# Patient Record
Sex: FEMALE | Race: WHITE | Employment: FULL TIME | URBAN - METROPOLITAN AREA
[De-identification: names, ages, dates, MRNs, and addresses within clinical notes are randomized per-mention and may not be internally consistent; named-entity substitution may affect disease eponyms.]

---

## 2017-11-25 ENCOUNTER — NURSE ONLY (OUTPATIENT)
Dept: FAMILY MEDICINE CLINIC | Facility: CLINIC | Age: 22
End: 2017-11-25

## 2017-11-25 VITALS
OXYGEN SATURATION: 97 % | RESPIRATION RATE: 20 BRPM | SYSTOLIC BLOOD PRESSURE: 118 MMHG | DIASTOLIC BLOOD PRESSURE: 80 MMHG | HEART RATE: 82 BPM | TEMPERATURE: 98 F

## 2017-11-25 DIAGNOSIS — L60.0 INGROWN NAIL: Primary | ICD-10-CM

## 2017-11-25 PROCEDURE — 99202 OFFICE O/P NEW SF 15 MIN: CPT | Performed by: NURSE PRACTITIONER

## 2017-11-25 RX ORDER — CEPHALEXIN 500 MG/1
500 CAPSULE ORAL 3 TIMES DAILY
Qty: 21 CAPSULE | Refills: 0 | Status: SHIPPED | OUTPATIENT
Start: 2017-11-25 | End: 2017-12-02

## 2017-11-25 NOTE — PATIENT INSTRUCTIONS
Follow up with podiatry  Ibuprofen as needed for pain        Ingrown Toenail (Excised)  An ingrown toenail occurs when the nail grows sideways into the skin next to the nail.  This can cause pain and may lead to an infection with redness, swelling, and some If there is a lot of redness and swelling, then an antibiotic may also be used. The redness and pain should go away within 48 hours. It will take about 2 weeks for the exposed nail bed to become dry and for the swelling to go down.   If only the side of the · Don’t use a sharp object to clean under your nail since this might cause an infection. · If the toenail starts to grow into the skin again, put a small piece of cotton under that side of the nail to help it grow out straight.   Follow-up care  Follow up

## 2017-11-25 NOTE — PROGRESS NOTES
CHIEF COMPLAINT:     Patient presents with:  Ingrown Toenail      HPI:   Steven Navarro is a 25year old female who presents with complaints of bilat ingrown toe nails that are very painful and draining yellow discharge.  Pt reports h/o ingrown nails and ha The most common cause of an ingrown toenail is trimming your toenails wrong. Most people trim the nails too close to the skin and try to round the nail too tightly around the shape of the toe. When you do this, the nail can grow into the skin of the toe.  Evander Harada If only the side of the nail was removed, it will begin to grow back in a few months. To prevent recurrence, sometimes the side of the nail bed may be treated with a strong chemical to prevent the nail from growing back.   Home care  Wound care  · Twice a d Follow-up care  Follow up as advised by your healthcare provider. If the ingrown toenail recurs, follow up with a foot specialist (podiatrist) for nail bed ablation.   When to seek medical care  Call your healthcare provider right away if any of these occur

## 2018-01-16 ENCOUNTER — LAB ENCOUNTER (OUTPATIENT)
Dept: LAB | Age: 23
End: 2018-01-16
Attending: INTERNAL MEDICINE
Payer: COMMERCIAL

## 2018-01-16 ENCOUNTER — OFFICE VISIT (OUTPATIENT)
Dept: FAMILY MEDICINE CLINIC | Facility: CLINIC | Age: 23
End: 2018-01-16

## 2018-01-16 VITALS
TEMPERATURE: 98 F | WEIGHT: 170.5 LBS | DIASTOLIC BLOOD PRESSURE: 70 MMHG | HEART RATE: 96 BPM | OXYGEN SATURATION: 97 % | SYSTOLIC BLOOD PRESSURE: 108 MMHG | HEIGHT: 66 IN | BODY MASS INDEX: 27.4 KG/M2

## 2018-01-16 DIAGNOSIS — N92.6 IRREGULAR PERIODS: ICD-10-CM

## 2018-01-16 DIAGNOSIS — L60.0 INGROWN LEFT BIG TOENAIL: Primary | ICD-10-CM

## 2018-01-16 DIAGNOSIS — R61 SWEATING PROFUSELY: ICD-10-CM

## 2018-01-16 PROCEDURE — 99203 OFFICE O/P NEW LOW 30 MIN: CPT | Performed by: INTERNAL MEDICINE

## 2018-01-16 PROCEDURE — 84402 ASSAY OF FREE TESTOSTERONE: CPT

## 2018-01-16 PROCEDURE — 80053 COMPREHEN METABOLIC PANEL: CPT

## 2018-01-16 PROCEDURE — 84439 ASSAY OF FREE THYROXINE: CPT

## 2018-01-16 PROCEDURE — 83002 ASSAY OF GONADOTROPIN (LH): CPT

## 2018-01-16 PROCEDURE — 36415 COLL VENOUS BLD VENIPUNCTURE: CPT

## 2018-01-16 PROCEDURE — 84403 ASSAY OF TOTAL TESTOSTERONE: CPT

## 2018-01-16 PROCEDURE — 83001 ASSAY OF GONADOTROPIN (FSH): CPT

## 2018-01-16 PROCEDURE — 85025 COMPLETE CBC W/AUTO DIFF WBC: CPT

## 2018-01-16 PROCEDURE — 84443 ASSAY THYROID STIM HORMONE: CPT

## 2018-01-16 PROCEDURE — 84146 ASSAY OF PROLACTIN: CPT

## 2018-01-16 RX ORDER — NORGESTIMATE AND ETHINYL ESTRADIOL 7DAYSX3 28
1 KIT ORAL DAILY
Qty: 3 PACKAGE | Refills: 3 | Status: SHIPPED | OUTPATIENT
Start: 2018-01-16 | End: 2018-04-10

## 2018-01-17 PROBLEM — Z91.51 HX OF SUICIDE ATTEMPT: Status: ACTIVE | Noted: 2018-01-17

## 2018-01-17 LAB
ALBUMIN SERPL-MCNC: 3.9 G/DL (ref 3.5–4.8)
ALP LIVER SERPL-CCNC: 75 U/L (ref 52–144)
ALT SERPL-CCNC: 29 U/L (ref 14–54)
AST SERPL-CCNC: 31 U/L (ref 15–41)
BASOPHILS # BLD AUTO: 0.03 X10(3) UL (ref 0–0.1)
BASOPHILS NFR BLD AUTO: 0.4 %
BILIRUB SERPL-MCNC: 0.4 MG/DL (ref 0.1–2)
BUN BLD-MCNC: 11 MG/DL (ref 8–20)
CALCIUM BLD-MCNC: 9.4 MG/DL (ref 8.3–10.3)
CHLORIDE: 103 MMOL/L (ref 101–111)
CO2: 27 MMOL/L (ref 22–32)
CREAT BLD-MCNC: 0.93 MG/DL (ref 0.55–1.02)
EOSINOPHIL # BLD AUTO: 0.07 X10(3) UL (ref 0–0.3)
EOSINOPHIL NFR BLD AUTO: 1 %
ERYTHROCYTE [DISTWIDTH] IN BLOOD BY AUTOMATED COUNT: 12.2 % (ref 11.5–16)
FREE T4: 0.9 NG/DL (ref 0.9–1.8)
FSH: 4.6 MIU/ML
GLUCOSE BLD-MCNC: 81 MG/DL (ref 70–99)
HCT VFR BLD AUTO: 42.3 % (ref 34–50)
HGB BLD-MCNC: 13.9 G/DL (ref 12–16)
IMMATURE GRANULOCYTE COUNT: 0.01 X10(3) UL (ref 0–1)
IMMATURE GRANULOCYTE RATIO %: 0.1 %
LH: 3.4 MIU/ML
LYMPHOCYTES # BLD AUTO: 2.12 X10(3) UL (ref 0.9–4)
LYMPHOCYTES NFR BLD AUTO: 31.2 %
M PROTEIN MFR SERPL ELPH: 8.1 G/DL (ref 6.1–8.3)
MCH RBC QN AUTO: 30.1 PG (ref 27–33.2)
MCHC RBC AUTO-ENTMCNC: 32.9 G/DL (ref 31–37)
MCV RBC AUTO: 91.6 FL (ref 81–100)
MONOCYTES # BLD AUTO: 0.57 X10(3) UL (ref 0.1–0.6)
MONOCYTES NFR BLD AUTO: 8.4 %
NEUTROPHIL ABS PRELIM: 3.99 X10 (3) UL (ref 1.3–6.7)
NEUTROPHILS # BLD AUTO: 3.99 X10(3) UL (ref 1.3–6.7)
NEUTROPHILS NFR BLD AUTO: 58.9 %
PLATELET # BLD AUTO: 328 10(3)UL (ref 150–450)
POTASSIUM SERPL-SCNC: 4 MMOL/L (ref 3.6–5.1)
PROLACTIN: 10.9 NG/ML
RBC # BLD AUTO: 4.62 X10(6)UL (ref 3.8–5.1)
RED CELL DISTRIBUTION WIDTH-SD: 41.1 FL (ref 35.1–46.3)
SODIUM SERPL-SCNC: 138 MMOL/L (ref 136–144)
TSI SER-ACNC: 1.66 MIU/ML (ref 0.35–5.5)
WBC # BLD AUTO: 6.8 X10(3) UL (ref 4–13)

## 2018-01-17 NOTE — PROGRESS NOTES
Steven Navarro is a 25year old female. HPI:   Pt new to me has a significant mental health hx. Grew up in a house hold where father addicted to drugs, and not reliable, and mother an alcoholic. She is one of nine children in a blended family.   She has rashes  RESPIRATORY: denies shortness of breath with exertion  CARDIOVASCULAR: denies chest pain on exertion  GI: denies abdominal pain and denies heartburn  NEURO: denies headaches    EXAM:   /70   Pulse 96   Temp 98.3 °F (36.8 °C) (Temporal)   Ht 6

## 2018-01-20 LAB
SEX HORMONE BINDING GLOBULIN: 54 NMOL/L
TESTOSTERONE -MS, BIOAVAILAB: 11.6 NG/DL
TESTOSTERONE, -MS/MS: 33 NG/DL
TESTOSTERONE, FREE -MS/MS: 4 PG/ML

## 2018-01-22 DIAGNOSIS — L60.0 INGROWN NAIL OF GREAT TOE OF LEFT FOOT: Primary | ICD-10-CM

## 2018-03-04 ENCOUNTER — PATIENT MESSAGE (OUTPATIENT)
Dept: FAMILY MEDICINE CLINIC | Facility: CLINIC | Age: 23
End: 2018-03-04

## 2018-03-05 PROBLEM — F32.9 MAJOR DEPRESSION: Status: ACTIVE | Noted: 2018-03-05

## 2018-03-05 NOTE — TELEPHONE ENCOUNTER
From: Nilda Hayden  To: Audrey Guerrero MD  Sent: 3/4/2018 3:04 PM CST  Subject: Prescription Question    The birth control I'm taking is really effecting my mood.  I've been extremely depressed and was waiting, hoping it would pass but I'm almost done wit

## 2018-03-05 NOTE — TELEPHONE ENCOUNTER
From: Colonel Xie  To: Song Kothari MD  Sent: 3/4/2018 10:08 PM CST  Subject: Other    I ended up going to the emergency room because my thoughts became so intense.  If you have an available appointment tomorrow I would like to see you as soon as sophy

## 2018-03-06 PROBLEM — Z30.41 ORAL CONTRACEPTIVE USE: Status: ACTIVE | Noted: 2018-03-06

## 2018-03-06 PROBLEM — F41.0 PANIC DISORDER WITHOUT AGORAPHOBIA: Status: ACTIVE | Noted: 2018-03-06

## 2018-03-06 PROBLEM — F39 EPISODIC MOOD DISORDER: Status: ACTIVE | Noted: 2018-03-06

## 2018-03-06 PROBLEM — F39 EPISODIC MOOD DISORDER (HCC): Status: ACTIVE | Noted: 2018-03-06

## 2018-03-13 ENCOUNTER — TELEPHONE (OUTPATIENT)
Dept: FAMILY MEDICINE CLINIC | Facility: CLINIC | Age: 23
End: 2018-03-13

## 2018-03-13 NOTE — PROGRESS NOTES
Chucky Horne is a 25year old female. HPI:   Pt has been to [facility] inpatient 3/4-3/11 for anxiety and self harm and transitioned to IOP. She does not want to complete IOP. She wants to start individual therapy.  She NEEDS me to write her prescript murmur  GI: good BS's,no masses, HSM or tenderness  EXTREMITIES: no cyanosis, clubbing or edema    ASSESSMENT AND PLAN:   Generalized anxiety disorder  (primary encounter diagnosis)   Pt has been stabilized on meds.  I discussed that she needs to use loraze

## 2018-04-09 RX ORDER — NORGESTIMATE AND ETHINYL ESTRADIOL 7DAYSX3 28
1 KIT ORAL DAILY
Qty: 3 PACKAGE | Refills: 3 | Status: CANCELLED
Start: 2018-04-09 | End: 2019-04-04

## 2018-04-10 NOTE — PROGRESS NOTES
Faina Smith is a 25year old female. HPI:   Pt had a break up last week. Very positive, she is taking Lorazepam at night only. She continues to exercise and lose weight. She needs refills on her medications,  No suicidal ideations.      Current Outpat of recurrent major depressive disorder (hcc)  (primary encounter diagnosis)  Pt has been doing well so far, she has a shallow support system and seems to be doing ok. She is weathering a break up smoothly.   She needs meds and I will refill for a month the

## 2018-04-11 NOTE — TELEPHONE ENCOUNTER
From: Yolie Barry  Sent: 4/9/2018 5:22 PM CDT  Subject: Medication Renewal Request    Lory lara would like a refill of the following medications:     Norgestim-Eth Estrad Triphasic (ORTHO TRI-CYCLEN, 28,) 0.18/0.215/0.25 MG-35 MCG Oral Tab Eri Gee MD]    Preferred pharmacy: [address] [place] [address], [address] [place] [address],[address] AT [facility] [address], [phone], [phone]    Comment:      Medication renewals requested in this message routed separately:     lamoTRIgine 25 MG Oral Tab Krystle Marshall MD]     Sertraline HCl 50 MG Oral Tab Krystle Marshall MD]

## 2018-05-03 RX ORDER — LAMOTRIGINE 25 MG/1
TABLET ORAL
Qty: 180 TABLET | Refills: 0 | Status: SHIPPED | OUTPATIENT
Start: 2018-05-03 | End: 2018-08-17

## 2018-05-23 ENCOUNTER — TELEPHONE (OUTPATIENT)
Dept: FAMILY MEDICINE CLINIC | Facility: CLINIC | Age: 23
End: 2018-05-23

## 2018-08-17 RX ORDER — LAMOTRIGINE 25 MG/1
TABLET ORAL
Qty: 180 TABLET | Refills: 0 | Status: SHIPPED | OUTPATIENT
Start: 2018-08-17 | End: 2018-09-17

## 2018-09-17 ENCOUNTER — OFFICE VISIT (OUTPATIENT)
Dept: FAMILY MEDICINE CLINIC | Facility: CLINIC | Age: 23
End: 2018-09-17

## 2018-09-17 ENCOUNTER — APPOINTMENT (OUTPATIENT)
Dept: LAB | Age: 23
End: 2018-09-17
Attending: INTERNAL MEDICINE
Payer: COMMERCIAL

## 2018-09-17 VITALS
DIASTOLIC BLOOD PRESSURE: 60 MMHG | WEIGHT: 146 LBS | TEMPERATURE: 97 F | BODY MASS INDEX: 24.32 KG/M2 | HEIGHT: 65 IN | SYSTOLIC BLOOD PRESSURE: 102 MMHG | RESPIRATION RATE: 12 BRPM | HEART RATE: 68 BPM

## 2018-09-17 DIAGNOSIS — Z00.00 WELL ADULT EXAM: Primary | ICD-10-CM

## 2018-09-17 DIAGNOSIS — Z00.00 WELL ADULT EXAM: ICD-10-CM

## 2018-09-17 LAB
APPEARANCE: CLEAR
MULTISTIX LOT#: NORMAL NUMERIC
PH, URINE: 7 (ref 4.5–8)
SPECIFIC GRAVITY: 1.02 (ref 1–1.03)
URINE-COLOR: YELLOW
UROBILINOGEN,SEMI-QN: 0.2 MG/DL (ref 0–1.9)

## 2018-09-17 PROCEDURE — 87591 N.GONORRHOEAE DNA AMP PROB: CPT | Performed by: INTERNAL MEDICINE

## 2018-09-17 PROCEDURE — 87389 HIV-1 AG W/HIV-1&-2 AB AG IA: CPT

## 2018-09-17 PROCEDURE — 87491 CHLMYD TRACH DNA AMP PROBE: CPT | Performed by: INTERNAL MEDICINE

## 2018-09-17 PROCEDURE — 88175 CYTOPATH C/V AUTO FLUID REDO: CPT | Performed by: INTERNAL MEDICINE

## 2018-09-17 PROCEDURE — 99395 PREV VISIT EST AGE 18-39: CPT | Performed by: INTERNAL MEDICINE

## 2018-09-17 PROCEDURE — 36415 COLL VENOUS BLD VENIPUNCTURE: CPT

## 2018-09-17 PROCEDURE — 81003 URINALYSIS AUTO W/O SCOPE: CPT | Performed by: INTERNAL MEDICINE

## 2018-09-17 RX ORDER — LAMOTRIGINE 25 MG/1
25 TABLET ORAL 2 TIMES DAILY
Qty: 180 TABLET | Refills: 3 | Status: SHIPPED | OUTPATIENT
Start: 2018-09-17 | End: 2019-11-08 | Stop reason: ALTCHOICE

## 2018-09-17 RX ORDER — LORAZEPAM 1 MG/1
1 TABLET ORAL 2 TIMES DAILY PRN
Qty: 30 TABLET | Refills: 0 | Status: SHIPPED | OUTPATIENT
Start: 2018-09-17 | End: 2019-11-08 | Stop reason: ALTCHOICE

## 2018-09-17 NOTE — PROGRESS NOTES
HPI:   Camila Gallagher is a 25year old female who presents for a complete physical exam. Symptoms: denies discharge, itching, burning or dysuria, periods are regular.  Patient complains of pain with intercourse, deep, start to finish--does not use condoms and Brother       Social History:   Social History    Tobacco Use      Smoking status: Never Smoker      Smokeless tobacco: Never Used    Alcohol use: No      Alcohol/week: 1.2 oz      Types: 2 Glasses of wine per week    Drug use: Yes      Types: Cannabis intact,motor and sensory are grossly intact    ASSESSMENT AND PLAN:   Madisyn Suggs is a 25year old female who presents for a complete physical exam. Pap and pelvic done. Health maintenance, will check fasting Lipids, CMP, and CBC.  Pt referred for screeni

## 2018-09-18 LAB
C TRACH DNA SPEC QL NAA+PROBE: NEGATIVE
N GONORRHOEA DNA SPEC QL NAA+PROBE: NEGATIVE

## 2018-11-15 NOTE — TELEPHONE ENCOUNTER
Last OV 9/17/18, last refills 9/17/18 with 3 refills to [facility]. LM for patient to [facility] does patient want this sent to [facility]?

## 2018-11-21 RX ORDER — LAMOTRIGINE 25 MG/1
TABLET ORAL
Qty: 180 TABLET | Refills: 0 | Status: SHIPPED | OUTPATIENT
Start: 2018-11-21 | End: 2019-08-20

## 2019-04-01 RX ORDER — NORGESTIMATE AND ETHINYL ESTRADIOL 7DAYSX3 28
KIT ORAL
Qty: 84 TABLET | Refills: 1 | Status: SHIPPED | OUTPATIENT
Start: 2019-04-01 | End: 2019-04-08

## 2019-04-08 ENCOUNTER — PATIENT MESSAGE (OUTPATIENT)
Dept: FAMILY MEDICINE CLINIC | Facility: CLINIC | Age: 24
End: 2019-04-08

## 2019-04-08 RX ORDER — NORGESTIMATE AND ETHINYL ESTRADIOL 7DAYSX3 28
1 KIT ORAL
Qty: 84 TABLET | Refills: 1 | Status: SHIPPED | OUTPATIENT
Start: 2019-04-08 | End: 2020-03-18

## 2019-04-08 NOTE — TELEPHONE ENCOUNTER
From: Stalin Abrams  To: Drew Plaza MD  Sent: 4/8/2019 4:05 PM CDT  Subject: Non-Urgent Medical Question    Can I have my prescription transferred to [address] [address] [address]

## 2019-04-09 NOTE — TELEPHONE ENCOUNTER
From: Atif Martínez  To: Shania Smith MD  Sent: 4/8/2019 4:57 PM CDT  Subject: Non-Urgent Medical Question    [facility] [place]?

## 2019-05-24 NOTE — TELEPHONE ENCOUNTER
Advised to come at 230pm today.
SHE WANTS TO KNOW IF SHE CAN COME IN TODAY TO SEE DR BUI BECAUSE SHE SAID THAT SHE NEEDS TO SEE HER RIGHT AWAY AND DOESN'T WANT TO WAIT UNTIL TOMORROW
Patient with one or more new problems requiring additional work-up/treatment.

## 2019-08-20 RX ORDER — LAMOTRIGINE 25 MG/1
TABLET ORAL
Qty: 180 TABLET | Refills: 0 | Status: SHIPPED | OUTPATIENT
Start: 2019-08-20 | End: 2019-11-08 | Stop reason: ALTCHOICE

## 2019-08-21 NOTE — TELEPHONE ENCOUNTER
[phone] (home)   Mailbox full. Unable to leave message. [phone] mom Marie's cell. Left message for patient to call back.   (Mom is listed on HIPPA form.)

## 2019-08-29 NOTE — TELEPHONE ENCOUNTER
[phone] (home)   Mailbox full. Unable to leave message. [phone] Left message for patient to call back.

## 2019-11-08 ENCOUNTER — OFFICE VISIT (OUTPATIENT)
Dept: FAMILY MEDICINE CLINIC | Facility: CLINIC | Age: 24
End: 2019-11-08
Payer: MEDICAID

## 2019-11-08 VITALS
BODY MASS INDEX: 25.99 KG/M2 | RESPIRATION RATE: 18 BRPM | DIASTOLIC BLOOD PRESSURE: 80 MMHG | HEIGHT: 65 IN | WEIGHT: 156 LBS | SYSTOLIC BLOOD PRESSURE: 120 MMHG | TEMPERATURE: 98 F | OXYGEN SATURATION: 98 % | HEART RATE: 88 BPM

## 2019-11-08 DIAGNOSIS — H60.392 OTHER INFECTIVE ACUTE OTITIS EXTERNA OF LEFT EAR: ICD-10-CM

## 2019-11-08 DIAGNOSIS — J01.00 ACUTE MAXILLARY SINUSITIS, RECURRENCE NOT SPECIFIED: ICD-10-CM

## 2019-11-08 DIAGNOSIS — T16.2XXA FOREIGN BODY OF LEFT EAR, INITIAL ENCOUNTER: Primary | ICD-10-CM

## 2019-11-08 PROCEDURE — 69200 CLEAR OUTER EAR CANAL: CPT | Performed by: NURSE PRACTITIONER

## 2019-11-08 PROCEDURE — 99213 OFFICE O/P EST LOW 20 MIN: CPT | Performed by: NURSE PRACTITIONER

## 2019-11-08 RX ORDER — NEOMYCIN SULFATE, POLYMYXIN B SULFATE AND HYDROCORTISONE 10; 3.5; 1 MG/ML; MG/ML; [USP'U]/ML
4 SUSPENSION/ DROPS AURICULAR (OTIC) 3 TIMES DAILY
Qty: 1 BOTTLE | Refills: 0 | Status: SHIPPED | OUTPATIENT
Start: 2019-11-08 | End: 2019-12-17 | Stop reason: ALTCHOICE

## 2019-11-08 RX ORDER — AMOXICILLIN AND CLAVULANATE POTASSIUM 875; 125 MG/1; MG/1
1 TABLET, FILM COATED ORAL 2 TIMES DAILY
Qty: 20 TABLET | Refills: 0 | Status: SHIPPED | OUTPATIENT
Start: 2019-11-08 | End: 2019-11-18

## 2019-11-08 NOTE — PROGRESS NOTES
CHIEF COMPLAINT:   Patient presents with:  Cough: sob, left earpain, sinus congestion  x 1 week       HPI:   Curly Adam is a 22year old female who presents for sinus congestion for  1  weeks. Symptoms have been worsening since onset.  Sinus congestion/pa LUNGS: denies shortness of breath or wheezing, See HPI  CARDIOVASCULAR: denies chest pain or palpitations   GI: denies N/V/C or abdominal pain  NEURO: + sinus headaches. No numbness or tingling in face.     EXAM:   /80 (BP Location: Left arm, Patient Meds as below. Mucinex to help thin secretions. Increase fluids and rest.  Comfort care as described in Patient Instructions. Follow up with PCP in 2-3 days if no improvement, sooner if worsening.  If any difficulty breathing, SOB, or wheezing seek emergen · You can use an over-the-counter decongestant, unless a similar medicine was prescribed to you. Nasal sprays work the fastest. Use one that contains phenylephrine or oxymetazoline. First blow your nose gently. Then use the spray.  Do not use these medicine · Don’t have close contact with people who have sore throats, colds, or other upper respiratory infections. · Don’t smoke, and stay away from secondhand smoke. · Stay up to date with of your vaccines.   Date Last Reviewed: 11/1/2017  © 1192-8162 [employer]

## 2019-12-17 ENCOUNTER — OFFICE VISIT (OUTPATIENT)
Dept: FAMILY MEDICINE CLINIC | Facility: CLINIC | Age: 24
End: 2019-12-17
Payer: MEDICAID

## 2019-12-17 VITALS
RESPIRATION RATE: 16 BRPM | HEART RATE: 82 BPM | BODY MASS INDEX: 28.82 KG/M2 | DIASTOLIC BLOOD PRESSURE: 70 MMHG | WEIGHT: 173 LBS | HEIGHT: 65 IN | SYSTOLIC BLOOD PRESSURE: 120 MMHG | TEMPERATURE: 98 F

## 2019-12-17 DIAGNOSIS — F41.9 ANXIETY: ICD-10-CM

## 2019-12-17 DIAGNOSIS — Z01.419 WELL WOMAN EXAM WITH ROUTINE GYNECOLOGICAL EXAM: Primary | ICD-10-CM

## 2019-12-17 DIAGNOSIS — R68.89 UNINTENTIONAL WEIGHT CHANGE: ICD-10-CM

## 2019-12-17 DIAGNOSIS — Z20.2 EXPOSURE TO SEXUALLY TRANSMITTED DISEASE (STD): ICD-10-CM

## 2019-12-17 DIAGNOSIS — J01.20 SUBACUTE ETHMOIDAL SINUSITIS: ICD-10-CM

## 2019-12-17 DIAGNOSIS — R10.2 PELVIC PAIN: ICD-10-CM

## 2019-12-17 DIAGNOSIS — N94.10 DYSPAREUNIA, FEMALE: ICD-10-CM

## 2019-12-17 PROBLEM — A60.00 GENITAL HERPES: Status: ACTIVE | Noted: 2019-07-26

## 2019-12-17 PROCEDURE — 86780 TREPONEMA PALLIDUM: CPT | Performed by: FAMILY MEDICINE

## 2019-12-17 PROCEDURE — 99385 PREV VISIT NEW AGE 18-39: CPT | Performed by: FAMILY MEDICINE

## 2019-12-17 PROCEDURE — 86803 HEPATITIS C AB TEST: CPT | Performed by: FAMILY MEDICINE

## 2019-12-17 PROCEDURE — 88175 CYTOPATH C/V AUTO FLUID REDO: CPT | Performed by: FAMILY MEDICINE

## 2019-12-17 PROCEDURE — 87591 N.GONORRHOEAE DNA AMP PROB: CPT | Performed by: FAMILY MEDICINE

## 2019-12-17 PROCEDURE — 86706 HEP B SURFACE ANTIBODY: CPT | Performed by: FAMILY MEDICINE

## 2019-12-17 PROCEDURE — 87624 HPV HI-RISK TYP POOLED RSLT: CPT | Performed by: FAMILY MEDICINE

## 2019-12-17 PROCEDURE — 84443 ASSAY THYROID STIM HORMONE: CPT | Performed by: FAMILY MEDICINE

## 2019-12-17 PROCEDURE — 80053 COMPREHEN METABOLIC PANEL: CPT | Performed by: FAMILY MEDICINE

## 2019-12-17 PROCEDURE — 85025 COMPLETE CBC W/AUTO DIFF WBC: CPT | Performed by: FAMILY MEDICINE

## 2019-12-17 PROCEDURE — 87389 HIV-1 AG W/HIV-1&-2 AB AG IA: CPT | Performed by: FAMILY MEDICINE

## 2019-12-17 PROCEDURE — 87625 HPV TYPES 16 & 18 ONLY: CPT | Performed by: FAMILY MEDICINE

## 2019-12-17 PROCEDURE — 87491 CHLMYD TRACH DNA AMP PROBE: CPT | Performed by: FAMILY MEDICINE

## 2019-12-17 PROCEDURE — 87340 HEPATITIS B SURFACE AG IA: CPT | Performed by: FAMILY MEDICINE

## 2019-12-17 RX ORDER — CEFDINIR 300 MG/1
300 CAPSULE ORAL 2 TIMES DAILY
Qty: 20 CAPSULE | Refills: 0 | Status: SHIPPED | OUTPATIENT
Start: 2019-12-17 | End: 2019-12-27

## 2019-12-17 NOTE — PROGRESS NOTES
HPI:   Warden Holbrook is a 22year old female who presents for a complete physical exam. Symptoms: denies discharge, itching, burning or dysuria, periods are regular. Patient complains of issues below. Was on lamictal and zoloft starting at age 13.  She w Oral Cap Take 1 capsule (300 mg total) by mouth 2 (two) times daily for 10 days. 20 capsule 0   • Norgestim-Eth Estrad Triphasic (TRI-SPRINTEC) 0.18/0.215/0.25 MG-35 MCG Oral Tab Take 1 tablet by mouth once daily.  84 tablet 1      Past Medical History:   D BMI 28.79 kg/m²   Body mass index is 28.79 kg/m².    GENERAL: well developed, well nourished,in no apparent distress  SKIN: no rashes,no suspicious lesions  HEENT: atraumatic, normocephalic,ears and throat are clear, but purulent drainage from sinuses and lexapro (something new for her). - treat sinus infection. - refer to gyne for pain with intercourse. - check labs as ordered.      Orders Placed This Encounter      CBC With Differential With Platelet      Comp Metabolic Panel (14)      TSH W Reflex T

## 2020-03-18 RX ORDER — NORGESTIMATE AND ETHINYL ESTRADIOL 7DAYSX3 28
KIT ORAL
Qty: 84 TABLET | Refills: 3 | Status: SHIPPED | OUTPATIENT
Start: 2020-03-18 | End: 2021-02-04

## 2020-03-18 NOTE — TELEPHONE ENCOUNTER
Gynecology Medication Protocol Passed3/18 3:26 PM   PASS-PENDING LAST PAP WNL--VIA MANUAL LOOKUP    Physical or Pelvic/Breast in past 12 or next 3 mos--VIA MANUAL LOOKUP     Last OV 12/17/19 with Dr Heriberto Babinski for well woman  Last GC 12/17/19  Last refill 4/8/1

## 2020-12-04 RX ORDER — NORGESTIMATE AND ETHINYL ESTRADIOL 7DAYSX3 28
1 KIT ORAL DAILY
Qty: 84 TABLET | Refills: 3 | OUTPATIENT
Start: 2020-12-04

## 2021-02-04 DIAGNOSIS — Z30.41 ORAL CONTRACEPTIVE USE: Primary | ICD-10-CM

## 2021-02-04 RX ORDER — NORGESTIMATE AND ETHINYL ESTRADIOL 7DAYSX3 28
1 KIT ORAL DAILY
Qty: 1 PACKAGE | Refills: 0 | Status: SHIPPED | OUTPATIENT
Start: 2021-02-04 | End: 2021-03-05

## 2021-02-04 NOTE — TELEPHONE ENCOUNTER
Gynecology Medication Protocol [id number]/04/2021 02:58 PM   Physical or Pelvic/Breast in past 12 or next 3 mos--VIA MANUAL LOOKUP    PASS-PENDING LAST PAP WNL--VIA MANUAL LOOKUP     Last refill on Tri-Sprintec #84 with 3 refills on 3 18 2020  Last OV on 12

## 2021-02-04 NOTE — TELEPHONE ENCOUNTER
Patient notified and verbalized understanding.      Physical scheduled  Future Appointments   Date Time Provider Mekhi Smith   2/8/2021 10:45 AM DO COOPER Aguilera [facility] Tyshawn Vegas      routed to  to advise if 3 month script ok or only 1 month to

## 2021-02-08 ENCOUNTER — OFFICE VISIT (OUTPATIENT)
Dept: FAMILY MEDICINE CLINIC | Facility: CLINIC | Age: 26
End: 2021-02-08
Payer: MEDICAID

## 2021-02-08 VITALS
HEIGHT: 65.5 IN | DIASTOLIC BLOOD PRESSURE: 86 MMHG | HEART RATE: 86 BPM | OXYGEN SATURATION: 99 % | WEIGHT: 181 LBS | RESPIRATION RATE: 16 BRPM | BODY MASS INDEX: 29.79 KG/M2 | SYSTOLIC BLOOD PRESSURE: 124 MMHG | TEMPERATURE: 98 F

## 2021-02-08 DIAGNOSIS — Z01.419 WELL WOMAN EXAM WITH ROUTINE GYNECOLOGICAL EXAM: Primary | ICD-10-CM

## 2021-02-08 DIAGNOSIS — F33.1 MODERATE EPISODE OF RECURRENT MAJOR DEPRESSIVE DISORDER (HCC): ICD-10-CM

## 2021-02-08 DIAGNOSIS — Z30.41 ORAL CONTRACEPTIVE USE: ICD-10-CM

## 2021-02-08 DIAGNOSIS — F39 EPISODIC MOOD DISORDER (HCC): ICD-10-CM

## 2021-02-08 DIAGNOSIS — Z20.2 EXPOSURE TO SEXUALLY TRANSMITTED DISEASE (STD): ICD-10-CM

## 2021-02-08 LAB
ALBUMIN SERPL-MCNC: 3.7 G/DL (ref 3.4–5)
ALBUMIN/GLOB SERPL: 1 {RATIO} (ref 1–2)
ALP LIVER SERPL-CCNC: 72 U/L
ALT SERPL-CCNC: 31 U/L
ANION GAP SERPL CALC-SCNC: 7 MMOL/L (ref 0–18)
AST SERPL-CCNC: 18 U/L (ref 15–37)
BASOPHILS # BLD AUTO: 0.02 X10(3) UL (ref 0–0.2)
BASOPHILS NFR BLD AUTO: 0.3 %
BILIRUB SERPL-MCNC: 0.3 MG/DL (ref 0.1–2)
BUN BLD-MCNC: 8 MG/DL (ref 7–18)
BUN/CREAT SERPL: 9.6 (ref 10–20)
CALCIUM BLD-MCNC: 9.1 MG/DL (ref 8.5–10.1)
CHLORIDE SERPL-SCNC: 106 MMOL/L (ref 98–112)
CHOLEST SMN-MCNC: 229 MG/DL (ref ?–200)
CO2 SERPL-SCNC: 26 MMOL/L (ref 21–32)
CREAT BLD-MCNC: 0.83 MG/DL
DEPRECATED RDW RBC AUTO: 42.3 FL (ref 35.1–46.3)
EOSINOPHIL # BLD AUTO: 0.09 X10(3) UL (ref 0–0.7)
EOSINOPHIL NFR BLD AUTO: 1.5 %
ERYTHROCYTE [DISTWIDTH] IN BLOOD BY AUTOMATED COUNT: 12.8 % (ref 11–15)
GLOBULIN PLAS-MCNC: 3.8 G/DL (ref 2.8–4.4)
GLUCOSE BLD-MCNC: 82 MG/DL (ref 70–99)
HAV IGM SER QL: NONREACTIVE
HBV CORE IGM SER QL: NONREACTIVE
HBV SURFACE AG SERPL QL IA: NONREACTIVE
HCT VFR BLD AUTO: 40.3 %
HCV AB SERPL QL IA: NONREACTIVE
HDLC SERPL-MCNC: 62 MG/DL (ref 40–59)
HGB BLD-MCNC: 12.8 G/DL
IMM GRANULOCYTES # BLD AUTO: 0.01 X10(3) UL (ref 0–1)
IMM GRANULOCYTES NFR BLD: 0.2 %
LDLC SERPL CALC-MCNC: 150 MG/DL (ref ?–100)
LYMPHOCYTES # BLD AUTO: 1.84 X10(3) UL (ref 1–4)
LYMPHOCYTES NFR BLD AUTO: 30.4 %
M PROTEIN MFR SERPL ELPH: 7.5 G/DL (ref 6.4–8.2)
MCH RBC QN AUTO: 28.8 PG (ref 26–34)
MCHC RBC AUTO-ENTMCNC: 31.8 G/DL (ref 31–37)
MCV RBC AUTO: 90.8 FL
MONOCYTES # BLD AUTO: 0.44 X10(3) UL (ref 0.1–1)
MONOCYTES NFR BLD AUTO: 7.3 %
NEUTROPHILS # BLD AUTO: 3.65 X10 (3) UL (ref 1.5–7.7)
NEUTROPHILS # BLD AUTO: 3.65 X10(3) UL (ref 1.5–7.7)
NEUTROPHILS NFR BLD AUTO: 60.3 %
NONHDLC SERPL-MCNC: 167 MG/DL (ref ?–130)
OSMOLALITY SERPL CALC.SUM OF ELEC: 285 MOSM/KG (ref 275–295)
PATIENT FASTING Y/N/NP: NO
PATIENT FASTING Y/N/NP: NO
PLATELET # BLD AUTO: 345 10(3)UL (ref 150–450)
POTASSIUM SERPL-SCNC: 4 MMOL/L (ref 3.5–5.1)
RBC # BLD AUTO: 4.44 X10(6)UL
SODIUM SERPL-SCNC: 139 MMOL/L (ref 136–145)
T PALLIDUM AB SER QL IA: NONREACTIVE
TRIGL SERPL-MCNC: 83 MG/DL (ref 30–149)
TSI SER-ACNC: 2.21 MIU/ML (ref 0.36–3.74)
VLDLC SERPL CALC-MCNC: 17 MG/DL (ref 0–30)
WBC # BLD AUTO: 6.1 X10(3) UL (ref 4–11)

## 2021-02-08 PROCEDURE — 88175 CYTOPATH C/V AUTO FLUID REDO: CPT | Performed by: FAMILY MEDICINE

## 2021-02-08 PROCEDURE — 86780 TREPONEMA PALLIDUM: CPT | Performed by: FAMILY MEDICINE

## 2021-02-08 PROCEDURE — 87491 CHLMYD TRACH DNA AMP PROBE: CPT | Performed by: FAMILY MEDICINE

## 2021-02-08 PROCEDURE — 3074F SYST BP LT 130 MM HG: CPT | Performed by: FAMILY MEDICINE

## 2021-02-08 PROCEDURE — 3079F DIAST BP 80-89 MM HG: CPT | Performed by: FAMILY MEDICINE

## 2021-02-08 PROCEDURE — 87389 HIV-1 AG W/HIV-1&-2 AB AG IA: CPT | Performed by: FAMILY MEDICINE

## 2021-02-08 PROCEDURE — 87591 N.GONORRHOEAE DNA AMP PROB: CPT | Performed by: FAMILY MEDICINE

## 2021-02-08 PROCEDURE — 87624 HPV HI-RISK TYP POOLED RSLT: CPT | Performed by: FAMILY MEDICINE

## 2021-02-08 PROCEDURE — 85025 COMPLETE CBC W/AUTO DIFF WBC: CPT | Performed by: FAMILY MEDICINE

## 2021-02-08 PROCEDURE — 3008F BODY MASS INDEX DOCD: CPT | Performed by: FAMILY MEDICINE

## 2021-02-08 PROCEDURE — 80074 ACUTE HEPATITIS PANEL: CPT | Performed by: FAMILY MEDICINE

## 2021-02-08 PROCEDURE — 80053 COMPREHEN METABOLIC PANEL: CPT | Performed by: FAMILY MEDICINE

## 2021-02-08 PROCEDURE — 84443 ASSAY THYROID STIM HORMONE: CPT | Performed by: FAMILY MEDICINE

## 2021-02-08 PROCEDURE — 99395 PREV VISIT EST AGE 18-39: CPT | Performed by: FAMILY MEDICINE

## 2021-02-08 PROCEDURE — 80061 LIPID PANEL: CPT | Performed by: FAMILY MEDICINE

## 2021-02-08 RX ORDER — ESCITALOPRAM OXALATE 10 MG/1
10 TABLET ORAL DAILY
Qty: 30 TABLET | Refills: 1 | Status: SHIPPED | OUTPATIENT
Start: 2021-02-08 | End: 2021-07-06

## 2021-02-08 NOTE — PROGRESS NOTES
HPI:   Colonel Xie is a 26year old female who presents for a complete physical exam. Symptoms: denies discharge, itching, burning or dysuria, periods are regular.  Patient complains of periods are bad with mood changes, but also feels like she has const diagnosed\"      No past surgical history on file.    Family History   Problem Relation Age of Onset   • Anxiety Father    • Substance Abuse Father    • Bipolar Disorder Father    • Other (Other) Father         substance abuse disorder   • Diabetes Mother CHEST: no chest tenderness  BREAST: no dominant or suspicious mass  LUNGS: clear to auscultation  CARDIO: RRR without murmur  GI: good BS's,no masses, HSM or tenderness  :introitus is normal,scant discharge, mild bleeding from menses, cervix is pink,no Comp Metabolic Panel (14)      Lipid Panel      TSH W Reflex To Free T4      HIV AG AB Combo [E]      T Pallidum Screening Cascade [E]      Hepatitis Panel, Acute (4) [E]      HIV Ag/Ab Combo      HIV Ag/Ab Combo Lavender Hold      *Venipuncture      Thin

## 2021-02-09 LAB
C TRACH DNA SPEC QL NAA+PROBE: NEGATIVE
N GONORRHOEA DNA SPEC QL NAA+PROBE: NEGATIVE

## 2021-02-11 LAB — HPV I/H RISK 1 DNA SPEC QL NAA+PROBE: NEGATIVE

## 2021-02-16 ENCOUNTER — TELEPHONE (OUTPATIENT)
Dept: FAMILY MEDICINE CLINIC | Facility: CLINIC | Age: 26
End: 2021-02-16

## 2021-02-16 DIAGNOSIS — N92.6 IRREGULAR PERIODS: ICD-10-CM

## 2021-02-16 DIAGNOSIS — R87.610 ATYPICAL SQUAMOUS CELLS OF UNDETERMINED SIGNIFICANCE ON CYTOLOGIC SMEAR OF CERVIX (ASC-US): Primary | ICD-10-CM

## 2021-02-16 NOTE — TELEPHONE ENCOUNTER
----- Message from Viry Mesa DO sent at 2/12/2021  2:03 PM CST -----  mychart sent: recall pap in 1 yr. Lory, your pap still shows the same mildly abnormal cells, but the HPV is negative this time. That is reassuring.  I still would like to repeat

## 2021-02-16 NOTE — TELEPHONE ENCOUNTER
Has not read [employer]    Patient notified and verbalized understanding.    States she would like referral to gyne    Number to [facility] group in [facility] provided

## 2021-02-23 ENCOUNTER — OFFICE VISIT (OUTPATIENT)
Dept: OBGYN CLINIC | Facility: CLINIC | Age: 26
End: 2021-02-23
Payer: MEDICAID

## 2021-02-23 VITALS
SYSTOLIC BLOOD PRESSURE: 122 MMHG | HEIGHT: 65 IN | WEIGHT: 178 LBS | BODY MASS INDEX: 29.66 KG/M2 | DIASTOLIC BLOOD PRESSURE: 80 MMHG

## 2021-02-23 DIAGNOSIS — R10.2 PELVIC PAIN: Primary | ICD-10-CM

## 2021-02-23 DIAGNOSIS — R19.4 FREQUENT BOWEL MOVEMENTS: ICD-10-CM

## 2021-02-23 DIAGNOSIS — N94.10 DYSPAREUNIA, FEMALE: ICD-10-CM

## 2021-02-23 LAB
APPEARANCE: CLEAR
MULTISTIX LOT#: 5077 NUMERIC
PH, URINE: 6 (ref 4.5–8)
SPECIFIC GRAVITY: 1.02 (ref 1–1.03)
URINE-COLOR: YELLOW
UROBILINOGEN,SEMI-QN: 0.2 MG/DL (ref 0–1.9)

## 2021-02-23 PROCEDURE — 3008F BODY MASS INDEX DOCD: CPT | Performed by: OBSTETRICS & GYNECOLOGY

## 2021-02-23 PROCEDURE — 3079F DIAST BP 80-89 MM HG: CPT | Performed by: OBSTETRICS & GYNECOLOGY

## 2021-02-23 PROCEDURE — 81002 URINALYSIS NONAUTO W/O SCOPE: CPT | Performed by: OBSTETRICS & GYNECOLOGY

## 2021-02-23 PROCEDURE — 99244 OFF/OP CNSLTJ NEW/EST MOD 40: CPT | Performed by: OBSTETRICS & GYNECOLOGY

## 2021-02-23 PROCEDURE — 3074F SYST BP LT 130 MM HG: CPT | Performed by: OBSTETRICS & GYNECOLOGY

## 2021-02-23 NOTE — PROGRESS NOTES
[facility]  Obstetrics and Gynecology  Consultation History & Physical    Faina Smith Patient Status:  No patient class for patient encounter    1995 MRN [id number]   Location [address] Attending No at Examination:  General appearance: Well dressed, well nourished in no apparent distress  Neurologic/Psychiatric: Alert and oriented to person, place and time, mood normal, affect appropriate  Head: Normocephalic without obvious deformity, atraumatic  Neck: provider  Obtaining a history  Evaluating the patient  Discussing treatment options  Counseling regarding chronic dyspareunia, diagnosis/treatment of endometriosis  Completing documentation     Cc to Dr. Charly Fermin

## 2021-02-24 ENCOUNTER — ULTRASOUND ENCOUNTER (OUTPATIENT)
Dept: OBGYN CLINIC | Facility: CLINIC | Age: 26
End: 2021-02-24
Payer: MEDICAID

## 2021-02-24 DIAGNOSIS — N94.10 DYSPAREUNIA, FEMALE: Primary | ICD-10-CM

## 2021-02-24 DIAGNOSIS — R10.2 PELVIC PAIN: ICD-10-CM

## 2021-02-24 PROCEDURE — 76830 TRANSVAGINAL US NON-OB: CPT | Performed by: OBSTETRICS & GYNECOLOGY

## 2021-02-24 PROCEDURE — 76856 US EXAM PELVIC COMPLETE: CPT | Performed by: OBSTETRICS & GYNECOLOGY

## 2021-02-26 ENCOUNTER — TELEPHONE (OUTPATIENT)
Dept: OBGYN CLINIC | Facility: CLINIC | Age: 26
End: 2021-02-26

## 2021-03-05 ENCOUNTER — TELEPHONE (OUTPATIENT)
Dept: FAMILY MEDICINE CLINIC | Facility: CLINIC | Age: 26
End: 2021-03-05

## 2021-03-05 DIAGNOSIS — Z30.41 ORAL CONTRACEPTIVE USE: ICD-10-CM

## 2021-03-05 RX ORDER — NORGESTIMATE AND ETHINYL ESTRADIOL 7DAYSX3 28
1 KIT ORAL DAILY
Qty: 3 PACKAGE | Refills: 3 | Status: SHIPPED | OUTPATIENT
Start: 2021-03-05 | End: 2021-04-19

## 2021-03-05 NOTE — TELEPHONE ENCOUNTER
Last OV 2/8/21  Last pap 2/8/21, repeat in 1 year  Last refilled 2/4/21 1 pkg   0 refills     Per v/o KE, refill x 1 year    Script sent

## 2021-03-27 ENCOUNTER — HOSPITAL ENCOUNTER (OUTPATIENT)
Age: 26
Discharge: HOME | End: 2021-03-27
Payer: MEDICAID

## 2021-03-27 VITALS
SYSTOLIC BLOOD PRESSURE: 138 MMHG | RESPIRATION RATE: 18 BRPM | HEART RATE: 102 BPM | TEMPERATURE: 99 F | OXYGEN SATURATION: 98 % | DIASTOLIC BLOOD PRESSURE: 76 MMHG

## 2021-03-27 DIAGNOSIS — J06.9 VIRAL UPPER RESPIRATORY ILLNESS: Primary | ICD-10-CM

## 2021-03-27 LAB — SARS-COV-2 RNA RESP QL NAA+PROBE: NOT DETECTED

## 2021-03-27 PROCEDURE — 99213 OFFICE O/P EST LOW 20 MIN: CPT | Performed by: PHYSICIAN ASSISTANT

## 2021-03-27 PROCEDURE — U0002 COVID-19 LAB TEST NON-CDC: HCPCS | Performed by: PHYSICIAN ASSISTANT

## 2021-03-27 RX ORDER — PREDNISONE 20 MG/1
40 TABLET ORAL DAILY
Qty: 10 TABLET | Refills: 0 | Status: SHIPPED | OUTPATIENT
Start: 2021-03-27 | End: 2021-04-01

## 2021-03-27 NOTE — ED INITIAL ASSESSMENT (HPI)
Positive covid 1/21/21. States 2 days ago same symptoms with congestion and loss of taste. Some chills last night. Cough and sore throat. States it feels it did when she had covid.

## 2021-03-27 NOTE — ED PROVIDER NOTES
Patient Seen in: Immediate [address] [place] [address]      History   Patient presents with:  Testing    Stated Complaint: testing    HPI/Subjective:   HPI     20-year-old female. Medical history of depression, hyperhidrosis, anxiety.   Patient arrives to me to care for laryngitis. Oropharynx visually benign.   Lung: No distress, RR, no retraction, breath sounds are clear bilaterally  Cardio: Regular rate and rhythm, normal S1-S2, no murmur appreciable      ED Course     Labs Reviewed   RAPID SARS-COV-2 BY PCR - Normal

## 2021-04-19 ENCOUNTER — OFFICE VISIT (OUTPATIENT)
Dept: FAMILY MEDICINE CLINIC | Facility: CLINIC | Age: 26
End: 2021-04-19
Payer: MEDICAID

## 2021-04-19 VITALS
BODY MASS INDEX: 31 KG/M2 | HEART RATE: 80 BPM | OXYGEN SATURATION: 99 % | WEIGHT: 185 LBS | TEMPERATURE: 98 F | RESPIRATION RATE: 18 BRPM | DIASTOLIC BLOOD PRESSURE: 70 MMHG | SYSTOLIC BLOOD PRESSURE: 126 MMHG

## 2021-04-19 DIAGNOSIS — H54.7 LOSS OF VISION: ICD-10-CM

## 2021-04-19 DIAGNOSIS — G43.109 MIGRAINE WITH AURA AND WITHOUT STATUS MIGRAINOSUS, NOT INTRACTABLE: ICD-10-CM

## 2021-04-19 DIAGNOSIS — H53.9 VISION CHANGES: Primary | ICD-10-CM

## 2021-04-19 PROCEDURE — 3074F SYST BP LT 130 MM HG: CPT | Performed by: FAMILY MEDICINE

## 2021-04-19 PROCEDURE — 99214 OFFICE O/P EST MOD 30 MIN: CPT | Performed by: FAMILY MEDICINE

## 2021-04-19 PROCEDURE — 3078F DIAST BP <80 MM HG: CPT | Performed by: FAMILY MEDICINE

## 2021-04-19 RX ORDER — SUMATRIPTAN 25 MG/1
25 TABLET, FILM COATED ORAL EVERY 2 HOUR PRN
Qty: 9 TABLET | Refills: 1 | Status: SHIPPED | OUTPATIENT
Start: 2021-04-19 | End: 2021-09-13

## 2021-04-19 NOTE — PROGRESS NOTES
Gardenia Klein is a 26year old female. Patient presents with:  Medication Follow-Up  Migraine: x3 days  Vision Problem: losing viaion due to migraine       HPI:   When she was in grade school and middle school she would get headaches with blurred vision. Disposable    Alcohol use:  Yes      Alcohol/week: 14.0 standard drinks      Types: 14 Standard drinks or equivalent per week    Drug use: Not Currently      Types: Cannabis      Comment: daily       BP Readings from Last 6 Encounters:  04/19/21 : 126/70  0 EXTERNAL  -     NEURO - INTERNAL  -     SUMAtriptan Succinate (IMITREX) 25 MG Oral Tab; Take 1 tablet (25 mg total) by mouth every 2 (two) hours as needed for Migraine.  Use at onset; repeat once after 2 HRS-ONLY 2 IN 24 HR MAX  - advised to stop OCP for no

## 2021-04-21 ENCOUNTER — OFFICE VISIT (OUTPATIENT)
Dept: NEUROLOGY | Facility: CLINIC | Age: 26
End: 2021-04-21
Payer: MEDICAID

## 2021-04-21 VITALS
RESPIRATION RATE: 16 BRPM | BODY MASS INDEX: 31 KG/M2 | HEART RATE: 70 BPM | DIASTOLIC BLOOD PRESSURE: 68 MMHG | SYSTOLIC BLOOD PRESSURE: 122 MMHG | WEIGHT: 186.38 LBS

## 2021-04-21 DIAGNOSIS — H53.9 VISUAL CHANGES: ICD-10-CM

## 2021-04-21 DIAGNOSIS — R20.2 PARESTHESIA: ICD-10-CM

## 2021-04-21 DIAGNOSIS — R51.9 HEADACHE DISORDER: Primary | ICD-10-CM

## 2021-04-21 PROCEDURE — 3078F DIAST BP <80 MM HG: CPT | Performed by: OTHER

## 2021-04-21 PROCEDURE — 3074F SYST BP LT 130 MM HG: CPT | Performed by: OTHER

## 2021-04-21 PROCEDURE — 99204 OFFICE O/P NEW MOD 45 MIN: CPT | Performed by: OTHER

## 2021-04-21 NOTE — PROGRESS NOTES
Patient states last two weeks patient states she had two migraines a week. Patient states she lost vision in the right eye, left eye was blurry. Patient states she has difficulty with focusing with her vision.  Patient states bilateral hands and feet will t

## 2021-04-21 NOTE — PROGRESS NOTES
[facility]    Date of consult: 4/21/2021    CC/Reason for consult: headache, paresthesia, vision change  Consult Requested by Denver San, DO    HPI: Alon Hughes is a 26year old female with past medical history as listed bel Smoker      Smokeless tobacco: Never Used      Tobacco comment: vaping. Alcohol use:  Yes      Alcohol/week: 14.0 standard drinks      Types: 14 Standard drinks or equivalent per week      Comment: occ    Family History   Problem Relation Age of Onset Reviewed  on 4/21/2021    Assessment & Plan:  Headache disorder: migraine likely  Visual disturbance  Paresthesia    Plan:  MRI brain, w,w/o  Ok to try imtrex prn first  Can try MDP for prolonged migraine  Headache diary advised  Migraine headache educatio

## 2021-04-23 ENCOUNTER — HOSPITAL ENCOUNTER (OUTPATIENT)
Dept: MRI IMAGING | Age: 26
Discharge: HOME | End: 2021-04-23
Attending: Other
Payer: MEDICAID

## 2021-04-23 DIAGNOSIS — R20.2 PARESTHESIA: ICD-10-CM

## 2021-04-23 DIAGNOSIS — R51.9 HEADACHE DISORDER: ICD-10-CM

## 2021-04-23 PROCEDURE — A9575 INJ GADOTERATE MEGLUMI 0.1ML: HCPCS | Performed by: OTHER

## 2021-04-23 PROCEDURE — 70553 MRI BRAIN STEM W/O & W/DYE: CPT | Performed by: OTHER

## 2021-05-10 ENCOUNTER — OFFICE VISIT (OUTPATIENT)
Dept: FAMILY MEDICINE CLINIC | Facility: CLINIC | Age: 26
End: 2021-05-10
Payer: MEDICAID

## 2021-05-10 VITALS
OXYGEN SATURATION: 99 % | DIASTOLIC BLOOD PRESSURE: 70 MMHG | RESPIRATION RATE: 16 BRPM | BODY MASS INDEX: 31.49 KG/M2 | TEMPERATURE: 98 F | WEIGHT: 189 LBS | HEIGHT: 65 IN | SYSTOLIC BLOOD PRESSURE: 118 MMHG | HEART RATE: 93 BPM

## 2021-05-10 DIAGNOSIS — N92.6 MISSED PERIOD: Primary | ICD-10-CM

## 2021-05-10 PROCEDURE — 3078F DIAST BP <80 MM HG: CPT | Performed by: FAMILY MEDICINE

## 2021-05-10 PROCEDURE — 81025 URINE PREGNANCY TEST: CPT | Performed by: FAMILY MEDICINE

## 2021-05-10 PROCEDURE — 99214 OFFICE O/P EST MOD 30 MIN: CPT | Performed by: FAMILY MEDICINE

## 2021-05-10 PROCEDURE — 3074F SYST BP LT 130 MM HG: CPT | Performed by: FAMILY MEDICINE

## 2021-05-10 PROCEDURE — 3008F BODY MASS INDEX DOCD: CPT | Performed by: FAMILY MEDICINE

## 2021-05-10 NOTE — PROGRESS NOTES
Alon Hughes is a 26year old female. Patient presents with:  No Period/no Cycle: possible pregnancy      HPI:   LMP started on April 7th. Woke up Friday AM feeling weird. Saturday AM, felt hard to breath, hadn't gotten period yet.    Took a pregnancy kg)  12/17/19 : 173 lb (78.5 kg)      REVIEW OF SYSTEMS:   GENERAL HEALTH: feels well no complaints  SKIN: denies any unusual skin lesions or rashes  RESPIRATORY: denies shortness of breath   CARDIOVASCULAR: denies chest pain    GI: denies abdominal pain a

## 2021-07-24 ENCOUNTER — PATIENT MESSAGE (OUTPATIENT)
Dept: FAMILY MEDICINE CLINIC | Facility: CLINIC | Age: 26
End: 2021-07-24

## 2021-07-24 ENCOUNTER — HOSPITAL ENCOUNTER (OUTPATIENT)
Age: 26
Discharge: HOME | End: 2021-07-24
Payer: MEDICAID

## 2021-07-24 VITALS
HEART RATE: 91 BPM | TEMPERATURE: 98 F | OXYGEN SATURATION: 98 % | RESPIRATION RATE: 18 BRPM | SYSTOLIC BLOOD PRESSURE: 120 MMHG | DIASTOLIC BLOOD PRESSURE: 75 MMHG

## 2021-07-24 DIAGNOSIS — R51.9 NONINTRACTABLE HEADACHE, UNSPECIFIED CHRONICITY PATTERN, UNSPECIFIED HEADACHE TYPE: Primary | ICD-10-CM

## 2021-07-24 DIAGNOSIS — R11.2 NON-INTRACTABLE VOMITING WITH NAUSEA, UNSPECIFIED VOMITING TYPE: ICD-10-CM

## 2021-07-24 DIAGNOSIS — K52.9 CHRONIC DIARRHEA: ICD-10-CM

## 2021-07-24 LAB
B-HCG UR QL: NEGATIVE
POCT BILIRUBIN URINE: NEGATIVE
POCT BLOOD URINE: NEGATIVE
POCT GLUCOSE URINE: NEGATIVE MG/DL
POCT KETONE URINE: NEGATIVE MG/DL
POCT LEUKOCYTE ESTERASE URINE: NEGATIVE
POCT NITRITE URINE: NEGATIVE
POCT PH URINE: 6.5 (ref 5–8)
POCT PROTEIN URINE: NEGATIVE MG/DL
POCT SPECIFIC GRAVITY URINE: 1.02
POCT URINE CLARITY: CLEAR
POCT URINE COLOR: YELLOW
POCT UROBILINOGEN URINE: 0.2 MG/DL
S PYO AG THROAT QL: NEGATIVE
SARS-COV-2 RNA RESP QL NAA+PROBE: NOT DETECTED

## 2021-07-24 PROCEDURE — U0002 COVID-19 LAB TEST NON-CDC: HCPCS | Performed by: NURSE PRACTITIONER

## 2021-07-24 PROCEDURE — 81025 URINE PREGNANCY TEST: CPT | Performed by: NURSE PRACTITIONER

## 2021-07-24 PROCEDURE — 87880 STREP A ASSAY W/OPTIC: CPT | Performed by: NURSE PRACTITIONER

## 2021-07-24 PROCEDURE — 99213 OFFICE O/P EST LOW 20 MIN: CPT | Performed by: NURSE PRACTITIONER

## 2021-07-24 PROCEDURE — 81002 URINALYSIS NONAUTO W/O SCOPE: CPT | Performed by: NURSE PRACTITIONER

## 2021-07-24 RX ORDER — ONDANSETRON 4 MG/1
4 TABLET, ORALLY DISINTEGRATING ORAL EVERY 8 HOURS PRN
Qty: 10 TABLET | Refills: 0 | Status: SHIPPED | OUTPATIENT
Start: 2021-07-24 | End: 2021-07-31

## 2021-07-24 NOTE — ED INITIAL ASSESSMENT (HPI)
Patient c/o headache for 1 week. Emesis x1 each day for the last 3 days. Left ear pain since yesterday. States she was exposed to Covid by 2 of her co workers.

## 2021-07-24 NOTE — TELEPHONE ENCOUNTER
From: Crow Yin  To: Devan Ramos DO  Sent: 7/24/2021 10:58 AM CDT  Subject: Non-Urgent Medical Question    Is there anywhere I can get rapid results?

## 2021-07-24 NOTE — ED PROVIDER NOTES
Patient Seen in: Immediate [facility]      History   Patient presents with:  Ear Pain  Headache  Vomiting    Stated Complaint: Cough, Headache, vomiting- exposed     HPI/Subjective:   HPI  Patient is 45-year-old female with past medical history of hyper abdominal disorders. She has not taken any medication to help her with the vomiting or the diarrhea.         Objective:   Past Medical History:   Diagnosis Date   • COVID-19    • Depression    • Hyperhidrosis     Pt pt \"recently diagnosed\"              Hi General:         Right eye: No discharge. Left eye: No discharge. Conjunctiva/sclera: Conjunctivae normal.   Cardiovascular:      Rate and Rhythm: Normal rate and regular rhythm. Pulses: Normal pulses.       Heart sounds: Normal heart soun patient is to try some antidiarrheals and follow-up with GI referral. Zofran for nausea. Rest and fluids.                              Disposition and Plan     Clinical Impression:  Nonintractable headache, unspecified chronicity pattern, unspecified headac

## 2021-09-13 DIAGNOSIS — F33.1 MODERATE EPISODE OF RECURRENT MAJOR DEPRESSIVE DISORDER (HCC): ICD-10-CM

## 2021-09-13 DIAGNOSIS — F39 EPISODIC MOOD DISORDER (HCC): ICD-10-CM

## 2021-09-13 DIAGNOSIS — G43.109 MIGRAINE WITH AURA AND WITHOUT STATUS MIGRAINOSUS, NOT INTRACTABLE: ICD-10-CM

## 2021-09-14 RX ORDER — ESCITALOPRAM OXALATE 10 MG/1
10 TABLET ORAL DAILY
Qty: 90 TABLET | Refills: 1 | Status: SHIPPED | OUTPATIENT
Start: 2021-09-14 | End: 2022-03-15 | Stop reason: ALTCHOICE

## 2021-09-14 RX ORDER — SUMATRIPTAN 25 MG/1
25 TABLET, FILM COATED ORAL EVERY 2 HOUR PRN
Qty: 9 TABLET | Refills: 1 | Status: SHIPPED | OUTPATIENT
Start: 2021-09-14 | End: 2022-03-15

## 2021-09-14 NOTE — TELEPHONE ENCOUNTER
Last OV 5/10/21  Last refilled:  7/6/21 Escitalopram #90  0 refills  4/19/21 Sumatriptan #9  1 refill

## 2021-12-07 ENCOUNTER — HOSPITAL ENCOUNTER (OUTPATIENT)
Age: 26
Discharge: HOME | End: 2021-12-07
Payer: MEDICAID

## 2021-12-07 VITALS
BODY MASS INDEX: 29.99 KG/M2 | TEMPERATURE: 99 F | SYSTOLIC BLOOD PRESSURE: 102 MMHG | DIASTOLIC BLOOD PRESSURE: 78 MMHG | HEIGHT: 65 IN | HEART RATE: 80 BPM | OXYGEN SATURATION: 97 % | WEIGHT: 180 LBS | RESPIRATION RATE: 16 BRPM

## 2021-12-07 DIAGNOSIS — Z20.822 EXPOSURE TO COVID-19 VIRUS: ICD-10-CM

## 2021-12-07 DIAGNOSIS — R51.9 ACUTE NONINTRACTABLE HEADACHE, UNSPECIFIED HEADACHE TYPE: Primary | ICD-10-CM

## 2021-12-07 PROCEDURE — 99213 OFFICE O/P EST LOW 20 MIN: CPT | Performed by: PHYSICIAN ASSISTANT

## 2021-12-07 PROCEDURE — U0002 COVID-19 LAB TEST NON-CDC: HCPCS | Performed by: PHYSICIAN ASSISTANT

## 2021-12-07 NOTE — ED PROVIDER NOTES
CVA (cerebral vascular accident) ([facility] [address] )  Pt is not on Aspirin due to the history of subdural hematoma  A repeat head CT will be done at this time  It is recommended that a statin be started for secondary stroke prevention  Therapy will be continued  She will follow-up in 4 months  [facility]  Continued follow-up with Neurosurgery  Patient Seen in: Immediate [address] [place] [address]      History   Patient presents with:  Headache  Covid-19 Test    Stated Complaint: exposed to CV19/headache    Subjective:   HPI    81-year-old female who comes in today she was exposed by someone in her house to C no trismus or drooling no phonation changes, patient handling secretions well   Lungs: Clear to auscultation bilaterally, respirations unlabored. No audible wheezing, rales or rhonchi.   Heart: Regular rate and rhythm, no murmurs      ED Course     Labs Rev

## 2021-12-07 NOTE — ED INITIAL ASSESSMENT (HPI)
Pt exposed to covid by someone in the house. Pt has been having periods since her period started last Thursday.

## 2022-02-08 ENCOUNTER — TELEPHONE (OUTPATIENT)
Dept: FAMILY MEDICINE CLINIC | Facility: CLINIC | Age: 27
End: 2022-02-08

## 2022-03-01 ENCOUNTER — HOSPITAL ENCOUNTER (OUTPATIENT)
Age: 27
Discharge: HOME | End: 2022-03-01
Payer: COMMERCIAL

## 2022-03-01 ENCOUNTER — APPOINTMENT (OUTPATIENT)
Dept: CT IMAGING | Age: 27
End: 2022-03-01
Attending: NURSE PRACTITIONER
Payer: COMMERCIAL

## 2022-03-01 VITALS
OXYGEN SATURATION: 100 % | RESPIRATION RATE: 17 BRPM | TEMPERATURE: 98 F | SYSTOLIC BLOOD PRESSURE: 107 MMHG | HEART RATE: 67 BPM | DIASTOLIC BLOOD PRESSURE: 60 MMHG

## 2022-03-01 DIAGNOSIS — K52.9 INFLAMMATORY BOWEL SYNDROME: Primary | ICD-10-CM

## 2022-03-01 DIAGNOSIS — K21.00 GASTROESOPHAGEAL REFLUX DISEASE WITH ESOPHAGITIS WITHOUT HEMORRHAGE: ICD-10-CM

## 2022-03-01 LAB
#MXD IC: 0.4 X10ˆ3/UL (ref 0.1–1)
B-HCG UR QL: NEGATIVE
BUN BLD-MCNC: 9 MG/DL (ref 7–18)
CHLORIDE BLD-SCNC: 103 MMOL/L (ref 98–112)
CO2 BLD-SCNC: 25 MMOL/L (ref 21–32)
CREAT BLD-MCNC: 0.7 MG/DL
GLUCOSE BLD-MCNC: 87 MG/DL (ref 70–99)
HCT VFR BLD AUTO: 41.6 %
HCT VFR BLD CALC: 40 %
HGB BLD-MCNC: 13.6 G/DL
ISTAT IONIZED CALCIUM FOR CHEM 8: 1.17 MMOL/L (ref 1.12–1.32)
LYMPHOCYTES # BLD AUTO: 1.8 X10ˆ3/UL (ref 1–4)
LYMPHOCYTES NFR BLD AUTO: 30.6 %
MCH RBC QN AUTO: 29.8 PG (ref 26–34)
MCHC RBC AUTO-ENTMCNC: 32.7 G/DL (ref 31–37)
MCV RBC AUTO: 91.2 FL (ref 80–100)
MIXED CELL %: 6.7 %
NEUTROPHILS # BLD AUTO: 3.6 X10ˆ3/UL (ref 1.5–7.7)
NEUTROPHILS NFR BLD AUTO: 62.7 %
PLATELET # BLD AUTO: 303 X10ˆ3/UL (ref 150–450)
POCT BILIRUBIN URINE: NEGATIVE
POCT BLOOD URINE: NEGATIVE
POCT GLUCOSE URINE: NEGATIVE MG/DL
POCT KETONE URINE: NEGATIVE MG/DL
POCT NITRITE URINE: NEGATIVE
POCT PH URINE: 6.5 (ref 5–8)
POCT PROTEIN URINE: NEGATIVE MG/DL
POCT SPECIFIC GRAVITY URINE: 1.02
POCT URINE CLARITY: CLEAR
POCT URINE COLOR: YELLOW
POCT UROBILINOGEN URINE: 0.2 MG/DL
POTASSIUM BLD-SCNC: 3.9 MMOL/L (ref 3.6–5.1)
RBC # BLD AUTO: 4.56 X10ˆ6/UL
SODIUM BLD-SCNC: 139 MMOL/L (ref 136–145)
WBC # BLD AUTO: 5.8 X10ˆ3/UL (ref 4–11)

## 2022-03-01 PROCEDURE — 81025 URINE PREGNANCY TEST: CPT | Performed by: NURSE PRACTITIONER

## 2022-03-01 PROCEDURE — 99214 OFFICE O/P EST MOD 30 MIN: CPT | Performed by: NURSE PRACTITIONER

## 2022-03-01 PROCEDURE — 80047 BASIC METABLC PNL IONIZED CA: CPT | Performed by: NURSE PRACTITIONER

## 2022-03-01 PROCEDURE — 96375 TX/PRO/DX INJ NEW DRUG ADDON: CPT | Performed by: NURSE PRACTITIONER

## 2022-03-01 PROCEDURE — 85025 COMPLETE CBC W/AUTO DIFF WBC: CPT | Performed by: NURSE PRACTITIONER

## 2022-03-01 PROCEDURE — 74177 CT ABD & PELVIS W/CONTRAST: CPT | Performed by: NURSE PRACTITIONER

## 2022-03-01 PROCEDURE — 81002 URINALYSIS NONAUTO W/O SCOPE: CPT | Performed by: NURSE PRACTITIONER

## 2022-03-01 PROCEDURE — 96374 THER/PROPH/DIAG INJ IV PUSH: CPT | Performed by: NURSE PRACTITIONER

## 2022-03-01 RX ORDER — KETOROLAC TROMETHAMINE 30 MG/ML
30 INJECTION, SOLUTION INTRAMUSCULAR; INTRAVENOUS ONCE
Status: COMPLETED | OUTPATIENT
Start: 2022-03-01 | End: 2022-03-01

## 2022-03-01 RX ORDER — ONDANSETRON 2 MG/ML
4 INJECTION INTRAMUSCULAR; INTRAVENOUS ONCE
Status: COMPLETED | OUTPATIENT
Start: 2022-03-01 | End: 2022-03-01

## 2022-03-01 RX ORDER — DICYCLOMINE HCL 20 MG
20 TABLET ORAL 4 TIMES DAILY PRN
Qty: 30 TABLET | Refills: 0 | Status: SHIPPED | OUTPATIENT
Start: 2022-03-01 | End: 2022-03-31

## 2022-03-01 RX ORDER — SODIUM CHLORIDE 9 MG/ML
1000 INJECTION, SOLUTION INTRAVENOUS ONCE
Status: COMPLETED | OUTPATIENT
Start: 2022-03-01 | End: 2022-03-01

## 2022-03-15 ENCOUNTER — OFFICE VISIT (OUTPATIENT)
Dept: FAMILY MEDICINE CLINIC | Facility: CLINIC | Age: 27
End: 2022-03-15
Payer: COMMERCIAL

## 2022-03-15 VITALS
TEMPERATURE: 98 F | RESPIRATION RATE: 16 BRPM | BODY MASS INDEX: 30.02 KG/M2 | SYSTOLIC BLOOD PRESSURE: 106 MMHG | WEIGHT: 189 LBS | OXYGEN SATURATION: 97 % | HEIGHT: 66.5 IN | DIASTOLIC BLOOD PRESSURE: 70 MMHG | HEART RATE: 75 BPM

## 2022-03-15 DIAGNOSIS — K52.9 COLITIS: ICD-10-CM

## 2022-03-15 DIAGNOSIS — Z01.419 WELL WOMAN EXAM WITH ROUTINE GYNECOLOGICAL EXAM: Primary | ICD-10-CM

## 2022-03-15 DIAGNOSIS — R53.82 CHRONIC FATIGUE: ICD-10-CM

## 2022-03-15 DIAGNOSIS — K52.9 CHRONIC DIARRHEA: ICD-10-CM

## 2022-03-15 PROCEDURE — 3008F BODY MASS INDEX DOCD: CPT | Performed by: FAMILY MEDICINE

## 2022-03-15 PROCEDURE — 3074F SYST BP LT 130 MM HG: CPT | Performed by: FAMILY MEDICINE

## 2022-03-15 PROCEDURE — 87624 HPV HI-RISK TYP POOLED RSLT: CPT | Performed by: FAMILY MEDICINE

## 2022-03-15 PROCEDURE — 87491 CHLMYD TRACH DNA AMP PROBE: CPT | Performed by: FAMILY MEDICINE

## 2022-03-15 PROCEDURE — 87591 N.GONORRHOEAE DNA AMP PROB: CPT | Performed by: FAMILY MEDICINE

## 2022-03-15 PROCEDURE — 3078F DIAST BP <80 MM HG: CPT | Performed by: FAMILY MEDICINE

## 2022-03-15 PROCEDURE — 99395 PREV VISIT EST AGE 18-39: CPT | Performed by: FAMILY MEDICINE

## 2022-03-16 LAB
C TRACH DNA SPEC QL NAA+PROBE: NEGATIVE
N GONORRHOEA DNA SPEC QL NAA+PROBE: NEGATIVE

## 2022-03-16 RX ORDER — SUMATRIPTAN 25 MG/1
25 TABLET, FILM COATED ORAL EVERY 2 HOUR PRN
Qty: 9 TABLET | Refills: 1 | Status: SHIPPED | OUTPATIENT
Start: 2022-03-16

## 2022-03-16 NOTE — TELEPHONE ENCOUNTER
Routing to provider per protocol. SUMAtriptan Succinate (IMITREX) 25 MG Oral Tab  Last refilled on 9/14/21 for #9  with 1 rf. Last labs 3/1/22. Last seen on 3/15/22. Future Appointments   Date Time Provider Mekhi Smith   4/25/2022 10:20 AM Wood Li, DO [facility]          Thank you.

## 2022-03-17 LAB
ABSOLUTE BASOPHILS: 38 CELLS/UL (ref 0–200)
ABSOLUTE EOSINOPHILS: 122 CELLS/UL (ref 15–500)
ABSOLUTE LYMPHOCYTES: 1677 CELLS/UL (ref 850–3900)
ABSOLUTE MONOCYTES: 557 CELLS/UL (ref 200–950)
ABSOLUTE NEUTROPHILS: 4006 CELLS/UL (ref 1500–7800)
ALBUMIN/GLOBULIN RATIO: 1.6 (CALC) (ref 1–2.5)
ALBUMIN: 4.3 G/DL (ref 3.6–5.1)
ALKALINE PHOSPHATASE: 75 U/L (ref 31–125)
ALT: 14 U/L (ref 6–29)
AST: 13 U/L (ref 10–30)
BASOPHILS: 0.6 %
BILIRUBIN, TOTAL: 0.3 MG/DL (ref 0.2–1.2)
BUN: 11 MG/DL (ref 7–25)
C-REACTIVE PROTEIN: 4.6 MG/L
CALCIUM: 9 MG/DL (ref 8.6–10.2)
CARBON DIOXIDE: 27 MMOL/L (ref 20–32)
CHLORIDE: 105 MMOL/L (ref 98–110)
CHOL/HDLC RATIO: 3.9 (CALC)
CHOLESTEROL, TOTAL: 209 MG/DL
CREATININE: 0.7 MG/DL (ref 0.5–1.1)
EGFR IF AFRICN AM: 140 ML/MIN/1.73M2
EGFR IF NONAFRICN AM: 120 ML/MIN/1.73M2
EOSINOPHILS: 1.9 %
FERRITIN: 38 NG/ML (ref 16–154)
FOLATE, SERUM: 12 NG/ML
GLOBULIN: 2.7 G/DL (CALC) (ref 1.9–3.7)
GLUCOSE: 84 MG/DL (ref 65–139)
HDL CHOLESTEROL: 53 MG/DL
HEMATOCRIT: 40.3 % (ref 35–45)
HEMOGLOBIN: 13.5 G/DL (ref 11.7–15.5)
IMMUNOGLOBULIN A: 181 MG/DL (ref 47–310)
LDL-CHOLESTEROL: 136 MG/DL (CALC)
LYMPHOCYTES: 26.2 %
MCH: 29.4 PG (ref 27–33)
MCHC: 33.5 G/DL (ref 32–36)
MCV: 87.8 FL (ref 80–100)
MONOCYTES: 8.7 %
MPV: 9.3 FL (ref 7.5–12.5)
NEUTROPHILS: 62.6 %
NON-HDL CHOLESTEROL: 156 MG/DL (CALC)
PLATELET COUNT: 336 THOUSAND/UL (ref 140–400)
POTASSIUM: 4.5 MMOL/L (ref 3.5–5.3)
PROTEIN, TOTAL: 7 G/DL (ref 6.1–8.1)
RDW: 12 % (ref 11–15)
RED BLOOD CELL COUNT: 4.59 MILLION/UL (ref 3.8–5.1)
SED RATE BY MODIFIED$WESTERGREN: 11 MM/H
SODIUM: 138 MMOL/L (ref 135–146)
T4, FREE: 1 NG/DL (ref 0.8–1.8)
TISSUE TRANSGLUTAMINASE$ANTIBODY, IGA: <1 U/ML
TRIGLYCERIDES: 92 MG/DL
TSH: 3.84 MIU/L
VITAMIN B12: 352 PG/ML (ref 200–1100)
VITAMIN D, 25-OH, TOTAL: 25 NG/ML (ref 30–100)
WHITE BLOOD CELL COUNT: 6.4 THOUSAND/UL (ref 3.8–10.8)

## 2022-03-28 LAB — HPV I/H RISK 1 DNA SPEC QL NAA+PROBE: NEGATIVE

## 2022-04-19 ENCOUNTER — TELEPHONE (OUTPATIENT)
Dept: FAMILY MEDICINE CLINIC | Facility: CLINIC | Age: 27
End: 2022-04-19

## 2023-02-27 ENCOUNTER — OFFICE VISIT (OUTPATIENT)
Dept: FAMILY MEDICINE CLINIC | Facility: CLINIC | Age: 28
End: 2023-02-27
Payer: MEDICAID

## 2023-02-27 VITALS
SYSTOLIC BLOOD PRESSURE: 118 MMHG | HEIGHT: 65 IN | TEMPERATURE: 98 F | OXYGEN SATURATION: 98 % | DIASTOLIC BLOOD PRESSURE: 80 MMHG | WEIGHT: 190 LBS | RESPIRATION RATE: 18 BRPM | HEART RATE: 83 BPM | BODY MASS INDEX: 31.65 KG/M2

## 2023-02-27 DIAGNOSIS — H92.02 LEFT EAR PAIN: ICD-10-CM

## 2023-02-27 DIAGNOSIS — J02.9 SORE THROAT: Primary | ICD-10-CM

## 2023-02-27 DIAGNOSIS — J06.9 VIRAL URI: ICD-10-CM

## 2023-02-27 LAB
CONTROL LINE PRESENT WITH A CLEAR BACKGROUND (YES/NO): YES YES/NO
STREP GRP A CUL-SCR: NEGATIVE

## 2023-02-27 PROCEDURE — 87081 CULTURE SCREEN ONLY: CPT | Performed by: NURSE PRACTITIONER

## 2023-02-27 RX ORDER — HYDROXYZINE HYDROCHLORIDE 25 MG/1
TABLET, FILM COATED ORAL
COMMUNITY
Start: 2023-01-26

## 2023-02-27 NOTE — PATIENT INSTRUCTIONS
1. Rest. Drink plenty of fluids. 2. Tylenol/Ibuprofen for pain/fevers. 3. Salt water gargles three times daily  4. Use humidifier at home when possible. 5. The rapid strep test was negative today. We will send a throat culture to lab and call you with results in 3-4 days. 6. Covid-19 testing declined. Pt notes she had covid-19 in the past 90 days. 7. Follow up with PMD in 4-5 days for re-eval. Go to the emergency department immediately if symptoms worsen, change, you develop chest discomfort, wheezing, shortness of breath, or if you have any concerns.

## 2023-07-17 ENCOUNTER — OFFICE VISIT (OUTPATIENT)
Dept: FAMILY MEDICINE CLINIC | Facility: CLINIC | Age: 28
End: 2023-07-17
Payer: COMMERCIAL

## 2023-07-17 ENCOUNTER — PATIENT MESSAGE (OUTPATIENT)
Dept: FAMILY MEDICINE CLINIC | Facility: CLINIC | Age: 28
End: 2023-07-17

## 2023-07-17 VITALS
BODY MASS INDEX: 32 KG/M2 | HEART RATE: 87 BPM | WEIGHT: 192.13 LBS | TEMPERATURE: 98 F | DIASTOLIC BLOOD PRESSURE: 72 MMHG | SYSTOLIC BLOOD PRESSURE: 126 MMHG | OXYGEN SATURATION: 99 %

## 2023-07-17 DIAGNOSIS — Z23 NEED FOR VACCINATION: ICD-10-CM

## 2023-07-17 DIAGNOSIS — R61 GENERALIZED HYPERHIDROSIS: ICD-10-CM

## 2023-07-17 DIAGNOSIS — Z00.00 HEALTHY ADULT ON ROUTINE PHYSICAL EXAMINATION: Primary | ICD-10-CM

## 2023-07-17 DIAGNOSIS — R53.82 CHRONIC FATIGUE: ICD-10-CM

## 2023-07-17 DIAGNOSIS — R06.83 SNORING: ICD-10-CM

## 2023-07-17 DIAGNOSIS — E66.9 CLASS 1 OBESITY WITH BODY MASS INDEX (BMI) OF 31.0 TO 31.9 IN ADULT, UNSPECIFIED OBESITY TYPE, UNSPECIFIED WHETHER SERIOUS COMORBIDITY PRESENT: ICD-10-CM

## 2023-07-17 RX ORDER — METFORMIN HYDROCHLORIDE 500 MG/1
500 TABLET, EXTENDED RELEASE ORAL DAILY
Qty: 30 TABLET | Refills: 0 | Status: SHIPPED | OUTPATIENT
Start: 2023-07-17

## 2023-07-26 ENCOUNTER — OFFICE VISIT (OUTPATIENT)
Dept: SLEEP CENTER | Age: 28
End: 2023-07-26
Attending: FAMILY MEDICINE
Payer: COMMERCIAL

## 2023-07-26 DIAGNOSIS — R53.82 CHRONIC FATIGUE: ICD-10-CM

## 2023-07-26 DIAGNOSIS — R06.83 SNORING: ICD-10-CM

## 2023-07-26 PROCEDURE — 95810 POLYSOM 6/> YRS 4/> PARAM: CPT

## 2023-08-03 ENCOUNTER — SLEEP STUDY (OUTPATIENT)
Facility: CLINIC | Age: 28
End: 2023-08-03
Payer: COMMERCIAL

## 2023-08-03 DIAGNOSIS — G47.9 SLEEP DISORDER: Primary | ICD-10-CM

## 2023-08-03 DIAGNOSIS — G47.33 OBSTRUCTIVE SLEEP APNEA SYNDROME: ICD-10-CM

## 2023-08-03 PROCEDURE — 95810 POLYSOM 6/> YRS 4/> PARAM: CPT | Performed by: OTHER

## 2023-08-21 ENCOUNTER — TELEPHONE (OUTPATIENT)
Dept: FAMILY MEDICINE CLINIC | Facility: CLINIC | Age: 28
End: 2023-08-21

## 2023-08-21 DIAGNOSIS — R61 HYPERHIDROSIS: Primary | ICD-10-CM

## 2023-08-21 NOTE — TELEPHONE ENCOUNTER
Received fax from [facility] regarding office notes for referral    Referral order placed per protocol with cosign required - referral order and clinical notes from Cyndra Boast faxed to [facility] referrals fax#[phone]    Watch for referral Murray Fox  (Message sent to referrals dept, Lesvia RED, awaiting response)

## 2023-08-21 NOTE — TELEPHONE ENCOUNTER
PT CALLED AND ADV WENT AND SAW DR MILA العلي [facility] [place] TODAY AND WAS ADV THAT SHE IS NEEDING A NEW REFERRAL TO BE PLACED BY PCP. PT NEEDS REFERRAL TO DR MICHAELA BAIG OUT OF [facility].       ETS - HYPERHYDROSIS     PLEASE ADV    THANK YOU

## 2023-08-21 NOTE — TELEPHONE ENCOUNTER
Pt with P O insurance - will need referral auth for external referral    Dr. Brigitte Eddy of [facility] - Thoracic Surgery    Endoscopic thoracic sympathectomy ([facility]) - thoracic surgery to treat hyperhidrosis    External referral order pending    Fax sent to Dr. Angely Bojorquez Dermatology office - Juan Mosquera send all clinical notes. Need for external referral for Dr. Brigitte Eddy. Need notes in order to process referral.\"    Awaiting fax back / clinical notes at this time      Advised patient of note above. Patient verbalized understanding. Advised she may call Dr. Eugenia Osborne office as well to get office notes for referral - she v/u. No further questions at this time.

## 2023-08-22 ENCOUNTER — TELEPHONE (OUTPATIENT)
Dept: FAMILY MEDICINE CLINIC | Facility: CLINIC | Age: 28
End: 2023-08-22

## 2023-08-22 NOTE — TELEPHONE ENCOUNTER
Jaya Kunz- can you please name a provider at one of those locations that this patient can see so we can update the referral?

## 2023-08-22 NOTE — TELEPHONE ENCOUNTER
Hi Dr. Wilber Armendariz,    I am working on the referral that was submitted for Lory to see Dr. Rachna Rodriguez from [place] [address]. [facility] is no longer a contracted tertiary care facility. Please redirect patient to either Yuli Diaz , or Yandel Virgen.     Thank you  Staci Wall

## 2023-08-23 NOTE — TELEPHONE ENCOUNTER
Fax sent to Dr. Carmen Pineda office at [facility]#[phone]  With comments: \"Dr. Phyllis Swenson referring pt to Dr. Sondra Leigh of [facility] - unfortunately, no longer contracted.  Please advise specialist at [facility], or [place]\"    Awaiting fax at this time

## 2023-08-23 NOTE — TELEPHONE ENCOUNTER
Hello,    I'm sorry, unfortunately we do not have a provider list for the [facility]. Lory can call NICK,[phone], her insurance, and they may be able to advise her of a specialist at one of the tertiary facilities in net work.     Thank you  Lien Segura

## 2023-08-23 NOTE — TELEPHONE ENCOUNTER
Referral order placed    [facility] sent to pt  Notify me if not read by 08/30/23

## 2023-08-23 NOTE — TELEPHONE ENCOUNTER
Received list of in-network providers from [facility] sent to pt  Pt to respond  Notify me if not read by 08/30/23

## 2023-08-23 NOTE — TELEPHONE ENCOUNTER
Adenike from [address] called regarding referral to Dr. Lucy Holguin. Mekhi Drummond was advised that [address] is no longer contracted with plan. Mekhi Drummond confirmed understand and noted she would advise patient to reach out to pcp regarding re-direction.      Thank you,  [facility]  Referral specialist

## 2023-08-24 ENCOUNTER — TELEPHONE (OUTPATIENT)
Dept: FAMILY MEDICINE CLINIC | Facility: CLINIC | Age: 28
End: 2023-08-24

## 2023-08-24 NOTE — TELEPHONE ENCOUNTER
Dr Elizabeth French office referred pt to dr Leon Augustine, he is not in her network,  [facility] and [facility], dr Yeyo Smith.  Call [facility]

## 2023-08-24 NOTE — TELEPHONE ENCOUNTER
Vesna Hernandez at [facility] dermatology notified patient has been referred to Dr Kervin Mujica

## 2023-09-20 ENCOUNTER — OFFICE VISIT (OUTPATIENT)
Dept: PODIATRY CLINIC | Facility: CLINIC | Age: 28
End: 2023-09-20

## 2023-09-20 VITALS — SYSTOLIC BLOOD PRESSURE: 120 MMHG | DIASTOLIC BLOOD PRESSURE: 80 MMHG

## 2023-09-20 DIAGNOSIS — M79.2 NEURALGIA OF RIGHT FOOT: Primary | ICD-10-CM

## 2023-09-20 DIAGNOSIS — L74.513 HYPERHIDROSIS OF FEET: ICD-10-CM

## 2023-09-20 DIAGNOSIS — G57.91 NEURITIS OF FOOT, RIGHT: ICD-10-CM

## 2023-09-20 PROCEDURE — 3079F DIAST BP 80-89 MM HG: CPT | Performed by: PODIATRIST

## 2023-09-20 PROCEDURE — 3074F SYST BP LT 130 MM HG: CPT | Performed by: PODIATRIST

## 2023-09-20 PROCEDURE — 64455 NJX AA&/STRD PLTR COM DG NRV: CPT | Performed by: PODIATRIST

## 2023-09-20 PROCEDURE — 99203 OFFICE O/P NEW LOW 30 MIN: CPT | Performed by: PODIATRIST

## 2023-09-20 RX ORDER — TRIAMCINOLONE ACETONIDE 40 MG/ML
20 INJECTION, SUSPENSION INTRA-ARTICULAR; INTRAMUSCULAR ONCE
Status: COMPLETED | OUTPATIENT
Start: 2023-09-20 | End: 2023-09-20

## 2023-09-20 RX ADMIN — TRIAMCINOLONE ACETONIDE 20 MG: 40 INJECTION, SUSPENSION INTRA-ARTICULAR; INTRAMUSCULAR at 16:38:00

## 2023-09-24 NOTE — PROGRESS NOTES
Truong Rivero is a 33year old female. Patient presents with:  Ingrown Toenail: Right great toe- had ingrown toenail removal 1 year ago- feels numbness- rates pain 6/10-left great toe- is also ingrown. HPI:   This pleasant patient presents to the clinic she recently had a toenail procedure done but then her insurance changed she could not follow-up with the doctor that she had to the toenail but she is not complaining of toenail pain she had this toenail removal approximately a year ago but now she feels numbness but only the smallest portion of her right great toe and she still feels like she has a needle being stuck into her toe at the medial side of her right great toe. At today's visit reviewed nurse's history as taken above, allergies medications and medical history as documented below. All changes duly noted  Allergies: Patient has no known allergies. Current Outpatient Medications   Medication Sig Dispense Refill    metFORMIN  MG Oral Tablet 24 Hr Take 1 tablet (500 mg total) by mouth 2 (two) times daily with meals. 180 tablet 0    hydrOXYzine 25 MG Oral Tab       dicyclomine 10 MG Oral Cap Take 1 capsule (10 mg total) by mouth 4 (four) times daily before meals and nightly. SUMAtriptan (IMITREX) 25 MG Oral Tab Take 1 tablet (25 mg total) by mouth every 2 (two) hours as needed for Migraine.  Use at onset; repeat once after 2 HRS-ONLY 2 IN 24 HR MAX 9 tablet 1      Past Medical History:   Diagnosis Date    Abdominal pain     Bad breath     Belching     Bleeding nose     Bloating     Blood in the stool     COVID-19     Decorative tattoo     Depression     Diarrhea, unspecified     Enlarged lymph node     Fatigue     Flatulence/gas pain/belching     Food intolerance     Frequent urination     Frequent UTI     Headache disorder     History of depression     History of mental disorder     Hyperhidrosis     Pt pt \"recently diagnosed\"    Indigestion     Irregular bowel habits     Menses painful Nausea     Night sweats     Pain with bowel movements     Stress     Vomiting     Weight gain       History reviewed. No pertinent surgical history. Family History   Problem Relation Age of Onset    Anxiety Father     Substance Abuse Father     Bipolar Disorder Father     Other (Other) Father         substance abuse disorder    Diabetes Mother     Cancer Mother     Anxiety Mother     Other (Other) Mother         substance abuse disorder    Diabetes Sister     Anxiety Sister     Suicide History Sister     Anxiety Brother     OCD Brother     Suicide History Brother       Social History    Socioeconomic History      Marital status: Single    Tobacco Use      Smoking status: Former        Packs/day: 0.00        Years: 0.00        Additional pack years: 0.00        Total pack years: 0.00        Types: Cigarettes      Smokeless tobacco: Never      Tobacco comments: vaping. Vaping Use      Vaping Use: Every day    Substance and Sexual Activity      Alcohol use: Yes        Alcohol/week: 14.0 standard drinks of alcohol        Types: 14 Standard drinks or equivalent per week        Comment: occ      Drug use: Not Currently    Other Topics      Concerns:        Caffeine Concern: Yes          1 cup daily         Exercise: Yes          walking        Seat Belt: Yes        Special Diet: No        Stress Concern: No        Weight Concern: Yes          REVIEW OF SYSTEMS:   Today reviewed systens as documented below  GENERAL HEALTH: feels well otherwise  SKIN: Refer to exam below  RESPIRATORY: denies shortness of breath with exertion  CARDIOVASCULAR: denies chest pain on exertion  GI: denies abdominal pain and denies heartburn  NEURO: denies headaches    EXAM:   /80   LMP 02/17/2023 (Exact Date)   GENERAL: well developed, well nourished, in no apparent distress  EXTREMITIES:   1. Integument: The skin on her feet is warm and and moist the patient does suffer from significant hyperhidrosis.   Her nail borders appear to be previously operated on no recurrence noted. 2. Vascular: Patient has palpable pulses   3. Neurologic: Patient has pain on palpation of the medial plantar aspect of the hallux but in the midportion of the proximal phalanx but just at the very medial plantar aspect. 4. Musculoskeletal: No deficits are noted in muscle strength. ASSESSMENT AND PLAN:   Diagnoses and all orders for this visit:    Neuralgia of right foot  -     triamcinolone acetonide (Kenalog-40) 40 MG/ML injection 20 mg    Neuritis of foot, right  -     triamcinolone acetonide (Kenalog-40) 40 MG/ML injection 20 mg    Hyperhidrosis of feet        Plan: I explained to the patient this is probably a neuralgia neuritis which has not resolved because she may have had a nerve injury with the injection to numb her toe. Today discussed the nature and extent of a cortisone injection as well as the possible complications and risks. Patient was in agreement to receive the injection. The patient was consented for a cortisone injection and after timeout was taken the injection consisting of 20 mg Kenalog and 0.5 cc of 0.5% Marcaine plain was injected into the symptomatic medial plantar nerve area of the right hallux using aseptic technique and appropriate approach. A Band-Aid was applied to the injection site. Patient should not walk barefoot until tomorrow and we will see her in follow-up in a couple of weeks and discuss the hyperhidrosis and possible use of Lazerformalyde solution. The patient indicates understanding of these issues and agrees to the plan.     Tri Dalton DPM

## 2023-10-03 ENCOUNTER — OFFICE VISIT (OUTPATIENT)
Dept: FAMILY MEDICINE CLINIC | Facility: CLINIC | Age: 28
End: 2023-10-03
Payer: COMMERCIAL

## 2023-10-03 VITALS
HEIGHT: 65 IN | BODY MASS INDEX: 29.99 KG/M2 | WEIGHT: 180 LBS | HEART RATE: 72 BPM | RESPIRATION RATE: 18 BRPM | TEMPERATURE: 98 F | OXYGEN SATURATION: 99 % | DIASTOLIC BLOOD PRESSURE: 87 MMHG | SYSTOLIC BLOOD PRESSURE: 127 MMHG

## 2023-10-03 DIAGNOSIS — J02.9 SORE THROAT: Primary | ICD-10-CM

## 2023-10-03 DIAGNOSIS — J01.10 ACUTE NON-RECURRENT FRONTAL SINUSITIS: ICD-10-CM

## 2023-10-03 LAB
CONTROL LINE PRESENT WITH A CLEAR BACKGROUND (YES/NO): YES YES/NO
KIT LOT #: NORMAL NUMERIC

## 2023-10-03 PROCEDURE — 3079F DIAST BP 80-89 MM HG: CPT | Performed by: PHYSICIAN ASSISTANT

## 2023-10-03 PROCEDURE — 3074F SYST BP LT 130 MM HG: CPT | Performed by: PHYSICIAN ASSISTANT

## 2023-10-03 PROCEDURE — 87880 STREP A ASSAY W/OPTIC: CPT | Performed by: PHYSICIAN ASSISTANT

## 2023-10-03 PROCEDURE — 87637 SARSCOV2&INF A&B&RSV AMP PRB: CPT | Performed by: PHYSICIAN ASSISTANT

## 2023-10-03 PROCEDURE — 3008F BODY MASS INDEX DOCD: CPT | Performed by: PHYSICIAN ASSISTANT

## 2023-10-03 PROCEDURE — 99213 OFFICE O/P EST LOW 20 MIN: CPT | Performed by: PHYSICIAN ASSISTANT

## 2023-10-03 RX ORDER — AMOXICILLIN AND CLAVULANATE POTASSIUM 875; 125 MG/1; MG/1
1 TABLET, FILM COATED ORAL 2 TIMES DAILY
Qty: 20 TABLET | Refills: 0 | Status: SHIPPED | OUTPATIENT
Start: 2023-10-03 | End: 2023-10-13

## 2023-10-04 LAB
FLUAV + FLUBV RNA SPEC NAA+PROBE: NOT DETECTED
FLUAV + FLUBV RNA SPEC NAA+PROBE: NOT DETECTED
RSV RNA SPEC NAA+PROBE: NOT DETECTED
SARS-COV-2 RNA RESP QL NAA+PROBE: NOT DETECTED

## 2023-10-26 ENCOUNTER — OFFICE VISIT (OUTPATIENT)
Dept: FAMILY MEDICINE CLINIC | Facility: CLINIC | Age: 28
End: 2023-10-26

## 2023-10-26 VITALS
BODY MASS INDEX: 31.26 KG/M2 | TEMPERATURE: 98 F | WEIGHT: 187.63 LBS | HEART RATE: 80 BPM | OXYGEN SATURATION: 96 % | SYSTOLIC BLOOD PRESSURE: 102 MMHG | DIASTOLIC BLOOD PRESSURE: 70 MMHG | HEIGHT: 65 IN

## 2023-10-26 DIAGNOSIS — R53.82 CHRONIC FATIGUE: ICD-10-CM

## 2023-10-26 DIAGNOSIS — E55.9 VITAMIN D DEFICIENCY: ICD-10-CM

## 2023-10-26 DIAGNOSIS — E66.09 CLASS 1 OBESITY DUE TO EXCESS CALORIES WITH BODY MASS INDEX (BMI) OF 31.0 TO 31.9 IN ADULT, UNSPECIFIED WHETHER SERIOUS COMORBIDITY PRESENT: Primary | ICD-10-CM

## 2023-10-26 DIAGNOSIS — Z00.00 PREVENTATIVE HEALTH CARE: ICD-10-CM

## 2023-10-26 LAB
ALBUMIN SERPL-MCNC: 3.9 G/DL (ref 3.4–5)
ALBUMIN/GLOB SERPL: 1 {RATIO} (ref 1–2)
ALP LIVER SERPL-CCNC: 72 U/L
ALT SERPL-CCNC: 31 U/L
ANION GAP SERPL CALC-SCNC: 7 MMOL/L (ref 0–18)
AST SERPL-CCNC: 20 U/L (ref 15–37)
BASOPHILS # BLD AUTO: 0.02 X10(3) UL (ref 0–0.2)
BASOPHILS NFR BLD AUTO: 0.4 %
BILIRUB SERPL-MCNC: 0.5 MG/DL (ref 0.1–2)
BUN BLD-MCNC: 9 MG/DL (ref 7–18)
CALCIUM BLD-MCNC: 9.2 MG/DL (ref 8.5–10.1)
CHLORIDE SERPL-SCNC: 106 MMOL/L (ref 98–112)
CHOLEST SERPL-MCNC: 217 MG/DL (ref ?–200)
CO2 SERPL-SCNC: 25 MMOL/L (ref 21–32)
CREAT BLD-MCNC: 0.9 MG/DL
EGFRCR SERPLBLD CKD-EPI 2021: 90 ML/MIN/1.73M2 (ref 60–?)
EOSINOPHIL # BLD AUTO: 0.17 X10(3) UL (ref 0–0.7)
EOSINOPHIL NFR BLD AUTO: 3 %
ERYTHROCYTE [DISTWIDTH] IN BLOOD BY AUTOMATED COUNT: 13 %
EST. AVERAGE GLUCOSE BLD GHB EST-MCNC: 114 MG/DL (ref 68–126)
FASTING PATIENT LIPID ANSWER: YES
FASTING STATUS PATIENT QL REPORTED: YES
GLOBULIN PLAS-MCNC: 4.1 G/DL (ref 2.8–4.4)
GLUCOSE BLD-MCNC: 89 MG/DL (ref 70–99)
HBA1C MFR BLD: 5.6 % (ref ?–5.7)
HCT VFR BLD AUTO: 43.8 %
HDLC SERPL-MCNC: 62 MG/DL (ref 40–59)
HGB BLD-MCNC: 14.2 G/DL
IMM GRANULOCYTES # BLD AUTO: 0.01 X10(3) UL (ref 0–1)
IMM GRANULOCYTES NFR BLD: 0.2 %
LDLC SERPL CALC-MCNC: 143 MG/DL (ref ?–100)
LYMPHOCYTES # BLD AUTO: 1.63 X10(3) UL (ref 1–4)
LYMPHOCYTES NFR BLD AUTO: 28.6 %
MCH RBC QN AUTO: 29.3 PG (ref 26–34)
MCHC RBC AUTO-ENTMCNC: 32.4 G/DL (ref 31–37)
MCV RBC AUTO: 90.5 FL
MONOCYTES # BLD AUTO: 0.5 X10(3) UL (ref 0.1–1)
MONOCYTES NFR BLD AUTO: 8.8 %
NEUTROPHILS # BLD AUTO: 3.36 X10 (3) UL (ref 1.5–7.7)
NEUTROPHILS # BLD AUTO: 3.36 X10(3) UL (ref 1.5–7.7)
NEUTROPHILS NFR BLD AUTO: 59 %
NONHDLC SERPL-MCNC: 155 MG/DL (ref ?–130)
OSMOLALITY SERPL CALC.SUM OF ELEC: 284 MOSM/KG (ref 275–295)
PLATELET # BLD AUTO: 352 10(3)UL (ref 150–450)
POTASSIUM SERPL-SCNC: 4.1 MMOL/L (ref 3.5–5.1)
PROT SERPL-MCNC: 8 G/DL (ref 6.4–8.2)
RBC # BLD AUTO: 4.84 X10(6)UL
SODIUM SERPL-SCNC: 138 MMOL/L (ref 136–145)
TRIGL SERPL-MCNC: 70 MG/DL (ref 30–149)
TSI SER-ACNC: 1.28 MIU/ML (ref 0.36–3.74)
VLDLC SERPL CALC-MCNC: 13 MG/DL (ref 0–30)
WBC # BLD AUTO: 5.7 X10(3) UL (ref 4–11)

## 2023-10-26 PROCEDURE — 3074F SYST BP LT 130 MM HG: CPT | Performed by: FAMILY MEDICINE

## 2023-10-26 PROCEDURE — 82306 VITAMIN D 25 HYDROXY: CPT | Performed by: FAMILY MEDICINE

## 2023-10-26 PROCEDURE — 80053 COMPREHEN METABOLIC PANEL: CPT | Performed by: FAMILY MEDICINE

## 2023-10-26 PROCEDURE — 84443 ASSAY THYROID STIM HORMONE: CPT | Performed by: FAMILY MEDICINE

## 2023-10-26 PROCEDURE — 3008F BODY MASS INDEX DOCD: CPT | Performed by: FAMILY MEDICINE

## 2023-10-26 PROCEDURE — 3078F DIAST BP <80 MM HG: CPT | Performed by: FAMILY MEDICINE

## 2023-10-26 PROCEDURE — 80061 LIPID PANEL: CPT | Performed by: FAMILY MEDICINE

## 2023-10-26 PROCEDURE — 99214 OFFICE O/P EST MOD 30 MIN: CPT | Performed by: FAMILY MEDICINE

## 2023-10-26 PROCEDURE — 83036 HEMOGLOBIN GLYCOSYLATED A1C: CPT | Performed by: FAMILY MEDICINE

## 2023-10-26 PROCEDURE — 85025 COMPLETE CBC W/AUTO DIFF WBC: CPT | Performed by: FAMILY MEDICINE

## 2023-10-26 RX ORDER — PHENTERMINE HYDROCHLORIDE 37.5 MG/1
37.5 TABLET ORAL
Qty: 30 TABLET | Refills: 0 | Status: SHIPPED | OUTPATIENT
Start: 2023-10-26

## 2023-10-27 DIAGNOSIS — E55.9 VITAMIN D DEFICIENCY: Primary | ICD-10-CM

## 2023-10-27 LAB — VIT D+METAB SERPL-MCNC: 25 NG/ML (ref 30–100)

## 2023-10-27 RX ORDER — ERGOCALCIFEROL 1.25 MG/1
50000 CAPSULE ORAL WEEKLY
Qty: 12 CAPSULE | Refills: 1 | Status: SHIPPED | OUTPATIENT
Start: 2023-10-27

## 2023-10-30 ENCOUNTER — OFFICE VISIT (OUTPATIENT)
Dept: PODIATRY CLINIC | Facility: CLINIC | Age: 28
End: 2023-10-30

## 2023-10-30 DIAGNOSIS — M79.674 PAIN IN TOES OF BOTH FEET: ICD-10-CM

## 2023-10-30 DIAGNOSIS — M79.675 PAIN IN TOES OF BOTH FEET: ICD-10-CM

## 2023-10-30 DIAGNOSIS — L74.513 HYPERHIDROSIS OF FEET: Primary | ICD-10-CM

## 2023-10-30 DIAGNOSIS — L60.0 ONYCHOCRYPTOSIS: ICD-10-CM

## 2023-11-01 ENCOUNTER — TELEPHONE (OUTPATIENT)
Dept: PODIATRY CLINIC | Facility: CLINIC | Age: 28
End: 2023-11-01

## 2023-11-01 DIAGNOSIS — L60.0 ONYCHOCRYPTOSIS: Primary | ICD-10-CM

## 2023-11-01 NOTE — TELEPHONE ENCOUNTER
S/w patient- She states that she was seen in clinic on 10/30/23 and she states that Dr Krystin Vale discussed outpatient surgery for the nails because she has had the office procedure done many times. Note is not in yet and I don't see any information about procedure.  She did states that he also discussed an Rx for mupirocin cream.     Dr Zoey Hernandez advise on mupirocin ointment for nails      Dr Krystin Vale- please advise on procedure for patient when you get back into office

## 2023-11-02 ENCOUNTER — TELEPHONE (OUTPATIENT)
Dept: PODIATRY CLINIC | Facility: CLINIC | Age: 28
End: 2023-11-02

## 2023-11-02 DIAGNOSIS — M79.675 PAIN IN TOES OF BOTH FEET: ICD-10-CM

## 2023-11-02 DIAGNOSIS — M79.674 PAIN IN TOES OF BOTH FEET: ICD-10-CM

## 2023-11-02 DIAGNOSIS — L60.0 ONYCHOCRYPTOSIS: Primary | ICD-10-CM

## 2023-11-02 NOTE — PROGRESS NOTES
Titi Ervin is a 33year old female. Patient presents with: Follow - Up: Left great toe- patient states she is here for a ingrown toenail removal follow up and to see if she needs another cortisone injection on right foot-   Ingrown Toenail: Right great toe- patient states she doesn't know if she has an ingrown toenail. Patient denies pain at this time. HPI:   Patient returns to the clinic she no longer has the shooting pain in the right hallux she said that is resolved completely after the cortisone injection still has a little numbness at the tip of the toe but she is always had that. She does have ingrown toenails on her great toes both feet. She is wondering if she needs to have them redone. At today's visit reviewed nurse's history as taken above, allergies medications and medical history as documented below. All changes duly noted  Allergies: Patient has no known allergies. Current Outpatient Medications   Medication Sig Dispense Refill    ergocalciferol 1.25 MG (12856 UT) Oral Cap Take 1 capsule (50,000 Units total) by mouth once a week. 12 capsule 1    Phentermine HCl 37.5 MG Oral Tab Take 1 tablet (37.5 mg total) by mouth every morning before breakfast. Start 1/2 tab once daily for 1-2 weeks and increase to full tablet if tolerated. 30 tablet 0    metFORMIN  MG Oral Tablet 24 Hr Take 1 tablet (500 mg total) by mouth 2 (two) times daily with meals. 180 tablet 0    hydrOXYzine 25 MG Oral Tab       dicyclomine 10 MG Oral Cap Take 1 capsule (10 mg total) by mouth 4 (four) times daily before meals and nightly. SUMAtriptan (IMITREX) 25 MG Oral Tab Take 1 tablet (25 mg total) by mouth every 2 (two) hours as needed for Migraine.  Use at onset; repeat once after 2 HRS-ONLY 2 IN 24 HR MAX 9 tablet 1    mupirocin 2 % External Ointment Apply topically to affected sites daily 15 g 0      Past Medical History:   Diagnosis Date    Abdominal pain     Bad breath     Belching     Bleeding nose Bloating     Blood in the stool     COVID-19     Decorative tattoo     Depression     Diarrhea, unspecified     Enlarged lymph node     Fatigue     Flatulence/gas pain/belching     Food intolerance     Frequent urination     Frequent UTI     Headache disorder     History of depression     History of mental disorder     Hyperhidrosis     Pt pt \"recently diagnosed\"    Indigestion     Irregular bowel habits     Menses painful     Nausea     Night sweats     Pain with bowel movements     Stress     Vomiting     Weight gain       History reviewed. No pertinent surgical history. Family History   Problem Relation Age of Onset    Anxiety Father     Substance Abuse Father     Bipolar Disorder Father     Other (Other) Father         substance abuse disorder    Diabetes Mother     Cancer Mother     Anxiety Mother     Other (Other) Mother         substance abuse disorder    Diabetes Sister     Anxiety Sister     Suicide History Sister     Anxiety Brother     OCD Brother     Suicide History Brother       Social History    Socioeconomic History      Marital status: Single    Tobacco Use      Smoking status: Former        Packs/day: 0.00        Years: 0.00        Additional pack years: 0.00        Total pack years: 0.00        Types: Cigarettes      Smokeless tobacco: Never      Tobacco comments: vaping. Vaping Use      Vaping Use: Former    Substance and Sexual Activity      Alcohol use:  Yes        Alcohol/week: 14.0 standard drinks of alcohol        Types: 14 Standard drinks or equivalent per week        Comment: occ      Drug use: Not Currently    Other Topics      Concerns:        Caffeine Concern: Yes          1 cup daily         Exercise: Yes          walking        Seat Belt: Yes        Special Diet: No        Stress Concern: No        Weight Concern: Yes          REVIEW OF SYSTEMS:   Today reviewed systens as documented below  GENERAL HEALTH: feels well otherwise  SKIN: Refer to exam below  RESPIRATORY: denies shortness of breath with exertion  CARDIOVASCULAR: denies chest pain on exertion  GI: denies abdominal pain and denies heartburn  NEURO: denies headaches    EXAM:   LMP 10/20/2023 (Exact Date)   GENERAL: well developed, well nourished, in no apparent distress  EXTREMITIES:   1. Integument: Skin on both feet was evaluated its warm and dry. She has a recurrent ingrown toenail on the medial nail border of her right hallux is painful. 2. Vascular: Patient has palpable pulses dorsalis pedis posterior tibial on the right and left foot   3. Neurologic: Check sensorium bilateral   4. Musculoskeletal: Good muscle strength. ASSESSMENT AND PLAN:   Diagnoses and all orders for this visit:    Hyperhidrosis of feet    Onychocryptosis    Pain in toes of both feet        Plan: Does not require another cortisone injection but she has had several failed attempts at permanent correction of her medial nail border of the right hallux with a chemical procedure indicating possible resistance to the phenol. At today's visit both conservative and surgical management was discussed for the diagnoses listed above. After a lengthy conversation the patient opted for surgery after questions were answered. The nature and extent of the surgery which would be, winograd onychoplasty medial nail border right hallux was explained in great detail. The pre-, acacia-and postoperative management was discussed, along with changes in bathing habits as well. The necessity for restricted activity possible nonweightbearing was also reviewed.   The possible complications and risks associated with the surgery including but not limited to recurrence of the deformity, nonresolution of the problem, infection as well as hospitalization and intravenous antibiotics if that occurs, the necessity for further surgery and/or hospitalization should complications occur or if an undesired result was obtained, and permanent numbness around the surgical site, guarantees or assurances were not offered. Questions were invited and answered to the best of my ability. At this time the patient wished to proceed with the surgical procedure and we will try to get that scheduled to the patient's convenience. The patient indicates understanding of these issues and agrees to the plan.     Madhu Hernandes DPM

## 2023-11-02 NOTE — TELEPHONE ENCOUNTER
Called patient and informed of RX for mupirocin sent to her pharmacy and that we would follow up after we speak with Dr. Radha Mcdonnell when he is back in the office next week. Patient verbalized understanding. Dr. Radha cMdonnell, please see earlier portion of conversation. Are we planning to do outpatient surgery for the nail? If so, do we need to reach out to [facility]?

## 2023-11-02 NOTE — TELEPHONE ENCOUNTER
Procedure: Adrián Madden onychoplasty medial nail border right hallux  CPT code: 81572  Length of Surgery: 45 minutes  Any Instruments: Podiatry tray  Call patient: ASAP  Anesthesia: MAC  Location: [facility]  Assistance: none  Pacemaker: No  Anticoagulants: No  Nickel Allergy: No  Latex Allergy: No  Diagnosis/ICD Code:   (L60.0) Onychocryptosis  (primary encounter diagnosis)  Plan:     (M79.674,  M79.675) Pain in toes of both feet  Plan:

## 2023-11-06 DIAGNOSIS — L60.0 ONYCHOCRYPTOSIS: Primary | ICD-10-CM

## 2023-11-06 DIAGNOSIS — M79.675 PAIN IN TOES OF BOTH FEET: ICD-10-CM

## 2023-11-06 DIAGNOSIS — M79.674 PAIN IN TOES OF BOTH FEET: ICD-10-CM

## 2023-11-06 NOTE — TELEPHONE ENCOUNTER
S/w patient and sx is scheduled for 1/17/2024 at [address] At this time, managed care orders have been placed, pre op instructions have been sent to [facility] and both post op appointments have been made. Patient and I have gone over pre op instructions and booked post op appointments together. Patient was advised to reach me directly at [phone] should there be any questions or concerns prior to sx. Patient expressed verbal understanding of all information given at this time.

## 2023-12-05 DIAGNOSIS — E66.09 CLASS 1 OBESITY DUE TO EXCESS CALORIES WITH BODY MASS INDEX (BMI) OF 31.0 TO 31.9 IN ADULT, UNSPECIFIED WHETHER SERIOUS COMORBIDITY PRESENT: ICD-10-CM

## 2023-12-06 NOTE — TELEPHONE ENCOUNTER
Pt failed refill protocol for the following reasons:     Name from pharmacy: PHENTERMINE 37.5MG TABLETS         Will file in chart as: PHENTERMINE HCL 37.5 MG Oral Tab    Sig: TAKE 1 TABLET(37.5 MG) BY MOUTH EVERY MORNING BEFORE BREAKFAST. START 1/2 TABLET EVERY DAY FOR 1-2 WEEKS AND. INCREASE TO 1 TABLET IF TOLERATED    Disp: 30 tablet    Refills: 0 (Pharmacy requested: Not specified)    Start: 12/5/2023    Class: Normal    Non-formulary For: Class 1 obesity due to excess calories with body mass index (BMI) of 31.0 to 31.9 in adult, unspecified whether serious comorbidity present    Last ordered: 1 month ago (10/26/2023) by Jamie Parra DO    Last refill: 10/26/2023    Rx #: [id number]       [facility] [address] [facility] [address] [facility] [place] [phone], [phone]       Last refill: 10/26/23  Last appt: 10/26/23  Next appt: Future Appointments   Date Time Provider Mekhi Smith   1/24/2024 12:30 PM Florin Grewal [id number]   1/31/2024 12:30 PM Rylee Urrutia, [id number]         Forward to Dr. Tobin Pedroza, please advise on refills. Thank you.

## 2023-12-07 RX ORDER — PHENTERMINE HYDROCHLORIDE 37.5 MG/1
37.5 TABLET ORAL
Qty: 30 TABLET | Refills: 0 | Status: SHIPPED | OUTPATIENT
Start: 2023-12-07

## 2023-12-26 ENCOUNTER — TELEPHONE (OUTPATIENT)
Dept: PODIATRY CLINIC | Facility: CLINIC | Age: 28
End: 2023-12-26

## 2023-12-26 NOTE — TELEPHONE ENCOUNTER
Rec'd disab form via mail. Sent [employer] message for vailed SHYANN. Fax#[phone]. Logged for processing.

## 2024-01-08 DIAGNOSIS — E66.09 CLASS 1 OBESITY DUE TO EXCESS CALORIES WITH BODY MASS INDEX (BMI) OF 31.0 TO 31.9 IN ADULT, UNSPECIFIED WHETHER SERIOUS COMORBIDITY PRESENT: ICD-10-CM

## 2024-01-09 RX ORDER — PHENTERMINE HYDROCHLORIDE 37.5 MG/1
37.5 TABLET ORAL
Qty: 30 TABLET | Refills: 0 | Status: SHIPPED | OUTPATIENT
Start: 2024-01-09

## 2024-01-09 NOTE — TELEPHONE ENCOUNTER
Routing to provider per protocol.   Phentermine HCl 37.5 MG Oral Tab   Last refilled on 12/7/23 for #30  with 0 rf.   Last labs on 10/26/23.   Last seen on 10/26/23.       Future Appointments   Date Time Provider Department Center   1/24/2024 12:30 PM Arnaud Urrutia DPM [id number]   1/31/2024 12:30 PM Arnaud Urrutia DPM [id number]          Thank you.

## 2024-01-09 NOTE — TELEPHONE ENCOUNTER
Script sent, but I would like her to follow up with me for weight check before next refill. Please schedule.

## 2024-01-10 ENCOUNTER — TELEPHONE (OUTPATIENT)
Dept: PODIATRY CLINIC | Facility: CLINIC | Age: 29
End: 2024-01-10

## 2024-01-10 ENCOUNTER — PATIENT MESSAGE (OUTPATIENT)
Dept: PODIATRY CLINIC | Facility: CLINIC | Age: 29
End: 2024-01-10

## 2024-01-10 DIAGNOSIS — M79.675 PAIN IN TOES OF BOTH FEET: ICD-10-CM

## 2024-01-10 DIAGNOSIS — L60.0 ONYCHOCRYPTOSIS: Primary | ICD-10-CM

## 2024-01-10 DIAGNOSIS — M79.674 PAIN IN TOES OF BOTH FEET: ICD-10-CM

## 2024-01-10 NOTE — TELEPHONE ENCOUNTER
Patient has upcoming surgery on 1/17 and calling to ensure that both right and left hallux will be done. Patient indicates this was discussed that she is unable to take off work again and request both at the same time. Please call at [phone], thanks.

## 2024-01-11 DIAGNOSIS — M79.674 PAIN IN TOES OF BOTH FEET: ICD-10-CM

## 2024-01-11 DIAGNOSIS — M79.675 PAIN IN TOES OF BOTH FEET: ICD-10-CM

## 2024-01-11 DIAGNOSIS — L60.0 ONYCHOCRYPTOSIS: Primary | ICD-10-CM

## 2024-01-16 DIAGNOSIS — L60.0 ONYCHOCRYPTOSIS: Primary | ICD-10-CM

## 2024-01-16 DIAGNOSIS — M79.675 PAIN IN TOES OF BOTH FEET: ICD-10-CM

## 2024-01-16 DIAGNOSIS — M79.674 PAIN IN TOES OF BOTH FEET: ICD-10-CM

## 2024-01-17 DIAGNOSIS — L60.0 ONYCHOCRYPTOSIS: Primary | ICD-10-CM

## 2024-01-17 DIAGNOSIS — M79.674 PAIN IN TOES OF BOTH FEET: ICD-10-CM

## 2024-01-17 DIAGNOSIS — M79.675 PAIN IN TOES OF BOTH FEET: ICD-10-CM

## 2024-01-17 PROCEDURE — 88312 SPECIAL STAINS GROUP 1: CPT | Performed by: PODIATRIST

## 2024-01-17 PROCEDURE — 88305 TISSUE EXAM BY PATHOLOGIST: CPT | Performed by: PODIATRIST

## 2024-01-18 ENCOUNTER — OFFICE VISIT (OUTPATIENT)
Dept: PODIATRY CLINIC | Facility: CLINIC | Age: 29
End: 2024-01-18

## 2024-01-18 DIAGNOSIS — Z98.890 STATUS POST FOOT SURGERY: Primary | ICD-10-CM

## 2024-01-18 PROCEDURE — 99024 POSTOP FOLLOW-UP VISIT: CPT | Performed by: PODIATRIST

## 2024-01-19 ENCOUNTER — TELEPHONE (OUTPATIENT)
Dept: PODIATRY CLINIC | Facility: CLINIC | Age: 29
End: 2024-01-19

## 2024-01-19 NOTE — TELEPHONE ENCOUNTER
Procedure date:1/17/2024      Procedure Laterality Anesthesia   Winograd onychoplasty medial toenail both borders hallux bilateral feet          How are you feeling? / I'M doing good. Saw Dr. Urrutia on 1/18/2024 for pain  Any bleeding?/ no    Is the dressing dry & intact?/ yes   Level of pain? / 3  Character of pain: throbbing  Onset of pain:/ the next morning 1/18/2024    Aggravating factors:standing  Duration of pain:/ until she elevates it  Alleviating factors:/ pain pill  Are you taking the prescribed medication? / yes  Dr. Urrutia had told her she could take 2 hydrocodone for severe pain. She can ibuprofen so I suggested alternating the two drugs to give better all day coverage  Are you following all of the PO instructions? / appetite good    Other Comments:We went over elevation and ice protocol    Follow-up appt  date: 1/24/2024    Pt was advised if they have any concerns after hours to call our office and they would be directed to on call physician.DONE

## 2024-01-22 NOTE — TELEPHONE ENCOUNTER
*Please try to avoid signing forms in the corner as it is not visible when printing and forms are not accepted this way. Thank you!      *The ACKNOWLEDGE button has been moved to the top right ribbon*         Dr. Ghada Roa -      Please sign off on this form if you agree to:      [facility] - Surgery for Onychocryptosis  Startin2024    Ending: Pending - Upcoming appointment on 2024      (place your signature on the first page only)     -From your Inbasket, Highlight the patient and click \"Chart\"   -Double click the 2023 Forms Completion telephone encounter  -Scroll down to the Media section   -Click the blue Hyperlinks: [facility] / Dr. Urrutia / 2024  -Click Acknowledge located in the top right ribbon/menu   -Drag the mouse into the blank space of the document and a + sign will appear. Left click to electronically sign the document.     Thank you!     Deb [facility]

## 2024-01-23 NOTE — PROGRESS NOTES
Lory Son is a 28 year old female.   Chief Complaint   Patient presents with    Post-Op     Post op pain left hallux -pain with burning 10/10 before medication pain in office 7/10 after medication.         HPI:   Patient now only 1 day status post surgery has tremendous pain especially the left.  Medications not helping that much.  At today's visit reviewed nurse's history as taken above, allergies medications and medical history as documented below.  All changes duly noted  Allergies: Patient has no known allergies.   Current Outpatient Medications   Medication Sig Dispense Refill    HYDROcodone-acetaminophen 5-325 MG Oral Tab Take 1 tablet by mouth every 6 (six) hours as needed for Pain. 30 tablet 0    amoxicillin clavulanate 875-125 MG Oral Tab Take 1 tablet by mouth 2 (two) times daily. 10 tablet 0    Phentermine HCl 37.5 MG Oral Tab Take 1 tablet (37.5 mg total) by mouth before breakfast. 30 tablet 0    mupirocin 2 % External Ointment Apply topically to affected sites daily 15 g 0    ergocalciferol 1.25 MG (02497 UT) Oral Cap Take 1 capsule (50,000 Units total) by mouth once a week. 12 capsule 1    metFORMIN  MG Oral Tablet 24 Hr Take 1 tablet (500 mg total) by mouth 2 (two) times daily with meals. 180 tablet 0    hydrOXYzine 25 MG Oral Tab       dicyclomine 10 MG Oral Cap Take 1 capsule (10 mg total) by mouth 4 (four) times daily before meals and nightly.      SUMAtriptan (IMITREX) 25 MG Oral Tab Take 1 tablet (25 mg total) by mouth every 2 (two) hours as needed for Migraine. Use at onset; repeat once after 2 HRS-ONLY 2 IN 24 HR MAX 9 tablet 1    Elastic Bandages & Supports (POST-OP SHOE/SOFT TOP WOMEN) Does not apply Misc Square toe post op shoe WL x2 2 each 0      Past Medical History:   Diagnosis Date    Abdominal pain     Bad breath     Belching     Bleeding nose     Bloating     Blood in the stool     COVID-19     Decorative tattoo     Depression     Diarrhea, unspecified     Enlarged lymph  node     Fatigue     Flatulence/gas pain/belching     Food intolerance     Frequent urination     Frequent UTI     Headache disorder     History of depression     History of mental disorder     Hyperhidrosis     Pt pt \"recently diagnosed\"    Indigestion     Irregular bowel habits     Menses painful     Nausea     Night sweats     Pain with bowel movements     Stress     Vomiting     Weight gain       History reviewed. No pertinent surgical history.   Family History   Problem Relation Age of Onset    Anxiety Father     Substance Abuse Father     Bipolar Disorder Father     Other (Other) Father         substance abuse disorder    Diabetes Mother     Cancer Mother     Anxiety Mother     Other (Other) Mother         substance abuse disorder    Diabetes Sister     Anxiety Sister     Suicide History Sister     Anxiety Brother     OCD Brother     Suicide History Brother       Social History     Socioeconomic History    Marital status: Single   Tobacco Use    Smoking status: Former     Packs/day: 0.00     Years: 0.00     Additional pack years: 0.00     Total pack years: 0.00     Types: Cigarettes    Smokeless tobacco: Never    Tobacco comments:     vaping.    Vaping Use    Vaping Use: Former   Substance and Sexual Activity    Alcohol use: Yes     Alcohol/week: 14.0 standard drinks of alcohol     Types: 14 Standard drinks or equivalent per week     Comment: occ    Drug use: Not Currently   Other Topics Concern    Caffeine Concern Yes     Comment: 1 cup daily     Exercise Yes     Comment: walking    Seat Belt Yes    Special Diet No    Stress Concern No    Weight Concern Yes           REVIEW OF SYSTEMS:   Today reviewed systens as documented below  GENERAL HEALTH: feels well otherwise  SKIN: Refer to exam below  RESPIRATORY: denies shortness of breath with exertion  CARDIOVASCULAR: denies chest pain on exertion  GI: denies abdominal pain and denies heartburn  NEURO: denies headaches    EXAM:   [facility] 12/25/2023 (Exact Date)    GENERAL: well developed, well nourished, in no apparent distress  EXTREMITIES:   Dressing change was performed on removal of the dressings especially the left a lot of the pain and burning was immediately relieved.  Dressing may have been somewhat too tight there is no vascular compromise noted to either toe.  No sign of infection.  ASSESSMENT AND PLAN:   Diagnoses and all orders for this visit:    Status post foot surgery        Plan: Septic dressing was reapplied with mild compression utilizing an Ace wrap patient told to expect some mild bleeding from the tips and then follow-up with us again next week.    The patient indicates understanding of these issues and agrees to the plan.    Arnaud Urrutia DPM

## 2024-01-24 ENCOUNTER — OFFICE VISIT (OUTPATIENT)
Dept: PODIATRY CLINIC | Facility: CLINIC | Age: 29
End: 2024-01-24
Payer: COMMERCIAL

## 2024-01-24 DIAGNOSIS — Z98.890 STATUS POST FOOT SURGERY: Primary | ICD-10-CM

## 2024-01-24 PROCEDURE — 99024 POSTOP FOLLOW-UP VISIT: CPT | Performed by: PODIATRIST

## 2024-01-24 NOTE — PROGRESS NOTES
Lory Son is a 28 year old female.   Chief Complaint   Patient presents with    Post-Op     Post op bilateral feet- pain 1/10 right foot. No numbness or tingling.         HPI:   Is a clinic now 1 week status post surgery minor discomfort only on the right foot.  At today's visit reviewed nurse's history as taken above, allergies medications and medical history as documented below.  All changes duly noted  Allergies: Patient has no known allergies.   Current Outpatient Medications   Medication Sig Dispense Refill    Elastic Bandages & Supports (POST-OP SHOE/SOFT TOP WOMEN) Does not apply Misc Square toe post op shoe WL x2 2 each 0    HYDROcodone-acetaminophen 5-325 MG Oral Tab Take 1 tablet by mouth every 6 (six) hours as needed for Pain. 30 tablet 0    amoxicillin clavulanate 875-125 MG Oral Tab Take 1 tablet by mouth 2 (two) times daily. 10 tablet 0    Phentermine HCl 37.5 MG Oral Tab Take 1 tablet (37.5 mg total) by mouth before breakfast. 30 tablet 0    mupirocin 2 % External Ointment Apply topically to affected sites daily 15 g 0    ergocalciferol 1.25 MG (72773 UT) Oral Cap Take 1 capsule (50,000 Units total) by mouth once a week. 12 capsule 1    metFORMIN  MG Oral Tablet 24 Hr Take 1 tablet (500 mg total) by mouth 2 (two) times daily with meals. 180 tablet 0    hydrOXYzine 25 MG Oral Tab       dicyclomine 10 MG Oral Cap Take 1 capsule (10 mg total) by mouth 4 (four) times daily before meals and nightly.      SUMAtriptan (IMITREX) 25 MG Oral Tab Take 1 tablet (25 mg total) by mouth every 2 (two) hours as needed for Migraine. Use at onset; repeat once after 2 HRS-ONLY 2 IN 24 HR MAX 9 tablet 1      Past Medical History:   Diagnosis Date    Abdominal pain     Bad breath     Belching     Bleeding nose     Bloating     Blood in the stool     COVID-19     Decorative tattoo     Depression     Diarrhea, unspecified     Enlarged lymph node     Fatigue     Flatulence/gas pain/belching     Food intolerance      Frequent urination     Frequent UTI     Headache disorder     History of depression     History of mental disorder     Hyperhidrosis     Pt pt \"recently diagnosed\"    Indigestion     Irregular bowel habits     Menses painful     Nausea     Night sweats     Pain with bowel movements     Stress     Vomiting     Weight gain       History reviewed. No pertinent surgical history.   Family History   Problem Relation Age of Onset    Anxiety Father     Substance Abuse Father     Bipolar Disorder Father     Other (Other) Father         substance abuse disorder    Diabetes Mother     Cancer Mother     Anxiety Mother     Other (Other) Mother         substance abuse disorder    Diabetes Sister     Anxiety Sister     Suicide History Sister     Anxiety Brother     OCD Brother     Suicide History Brother       Social History     Socioeconomic History    Marital status: Single   Tobacco Use    Smoking status: Former     Packs/day: 0.00     Years: 0.00     Additional pack years: 0.00     Total pack years: 0.00     Types: Cigarettes    Smokeless tobacco: Never    Tobacco comments:     vaping.    Vaping Use    Vaping Use: Former   Substance and Sexual Activity    Alcohol use: Yes     Alcohol/week: 14.0 standard drinks of alcohol     Types: 14 Standard drinks or equivalent per week     Comment: occ    Drug use: Not Currently   Other Topics Concern    Caffeine Concern Yes     Comment: 1 cup daily     Exercise Yes     Comment: walking    Seat Belt Yes    Special Diet No    Stress Concern No    Weight Concern Yes           REVIEW OF SYSTEMS:   Today reviewed systens as documented below  GENERAL HEALTH: feels well otherwise  SKIN: Refer to exam below  RESPIRATORY: denies shortness of breath with exertion  CARDIOVASCULAR: denies chest pain on exertion  GI: denies abdominal pain and denies heartburn  NEURO: denies headaches    EXAM:   [facility] 12/25/2023 (Exact Date)   GENERAL: well developed, well nourished, in no apparent  distress  EXTREMITIES:   Skin incisions are healing uneventfully no sign of infection noted  ASSESSMENT AND PLAN:   Diagnoses and all orders for this visit:    Status post foot surgery        Plan: Aseptic dressing was reapplied continue with wearing the surgical shoes can increase ambulation a little bit but overall has to keep the dressings clean and dry and not walk without the surgical shoes.    The patient indicates understanding of these issues and agrees to the plan.    Arnaud Urrutia DPM

## 2024-01-25 NOTE — TELEPHONE ENCOUNTER
STD form ([facility]) has been faxed successfully - fax #: [phone]. Sent PT a [place] message.

## 2024-01-29 ENCOUNTER — OFFICE VISIT (OUTPATIENT)
Dept: FAMILY MEDICINE CLINIC | Facility: CLINIC | Age: 29
End: 2024-01-29
Payer: COMMERCIAL

## 2024-01-29 VITALS
RESPIRATION RATE: 18 BRPM | TEMPERATURE: 99 F | BODY MASS INDEX: 29 KG/M2 | OXYGEN SATURATION: 99 % | SYSTOLIC BLOOD PRESSURE: 130 MMHG | DIASTOLIC BLOOD PRESSURE: 70 MMHG | HEART RATE: 87 BPM | WEIGHT: 168.25 LBS

## 2024-01-29 DIAGNOSIS — E66.09 CLASS 1 OBESITY DUE TO EXCESS CALORIES WITH BODY MASS INDEX (BMI) OF 31.0 TO 31.9 IN ADULT, UNSPECIFIED WHETHER SERIOUS COMORBIDITY PRESENT: Primary | ICD-10-CM

## 2024-01-29 PROCEDURE — 99214 OFFICE O/P EST MOD 30 MIN: CPT | Performed by: FAMILY MEDICINE

## 2024-01-29 PROCEDURE — 3075F SYST BP GE 130 - 139MM HG: CPT | Performed by: FAMILY MEDICINE

## 2024-01-29 PROCEDURE — 3078F DIAST BP <80 MM HG: CPT | Performed by: FAMILY MEDICINE

## 2024-01-29 NOTE — PROGRESS NOTES
Lory Son is a 28 year old female.  Chief Complaint   Patient presents with    Follow - Up       HPI:   Weight loss: we started phentermine 37.5 mg in October. She lost 20 lbs since then. Mouth was dry, hyperhidrosis is better, less sweaty. She felt more energized. Felt more happy overall, more motivation. She no longer feels like she has to take a nap in the middle of the day. No longer drinking coffee to stay awake.   No longer on metformin, got nausea/diarrhea/dizziness on it the last time she took it and no longer takes it.   Stopped eating sugar and chocolate. No longer stopping for fast food. No longer craves them.   Refilled it on 1/10. Retarted it about 3 days ago.   She was off of it for recent foot surgery a couple of weeks ago.     She's been in counseling for yrs from trauma of growing up through  parents. Always focus was on sadness, but also overwhelmed feeling. Never really addressed focus or adhd to her knowledge. Has never been on a stimulant before.     ALLERGIES:  No Known Allergies      Current Outpatient Medications   Medication Sig Dispense Refill    Elastic Bandages & Supports (POST-OP SHOE/SOFT TOP WOMEN) Does not apply Misc Square toe post op shoe WL x2 2 each 0    Phentermine HCl 37.5 MG Oral Tab Take 1 tablet (37.5 mg total) by mouth before breakfast. 30 tablet 0    ergocalciferol 1.25 MG (23128 UT) Oral Cap Take 1 capsule (50,000 Units total) by mouth once a week. 12 capsule 1    hydrOXYzine 25 MG Oral Tab       dicyclomine 10 MG Oral Cap Take 1 capsule (10 mg total) by mouth 4 (four) times daily before meals and nightly.      SUMAtriptan (IMITREX) 25 MG Oral Tab Take 1 tablet (25 mg total) by mouth every 2 (two) hours as needed for Migraine. Use at onset; repeat once after 2 HRS-ONLY 2 IN 24 HR MAX 9 tablet 1    HYDROcodone-acetaminophen 5-325 MG Oral Tab Take 1 tablet by mouth every 6 (six) hours as needed for Pain. (Patient not taking: Reported on 1/29/2024) 30 tablet 0     amoxicillin clavulanate 875-125 MG Oral Tab Take 1 tablet by mouth 2 (two) times daily. (Patient not taking: Reported on 1/29/2024) 10 tablet 0    mupirocin 2 % External Ointment Apply topically to affected sites daily (Patient not taking: Reported on 1/29/2024) 15 g 0    metFORMIN  MG Oral Tablet 24 Hr Take 1 tablet (500 mg total) by mouth 2 (two) times daily with meals. (Patient not taking: Reported on 1/29/2024) 180 tablet 0      Past Medical History:   Diagnosis Date    Abdominal pain     Bad breath     Belching     Bleeding nose     Bloating     Blood in the stool     COVID-19     Decorative tattoo     Depression     Diarrhea, unspecified     Enlarged lymph node     Fatigue     Flatulence/gas pain/belching     Food intolerance     Frequent urination     Frequent UTI     Headache disorder     History of depression     History of mental disorder     Hyperhidrosis     Pt pt \"recently diagnosed\"    Indigestion     Irregular bowel habits     Menses painful     Nausea     Night sweats     Pain with bowel movements     Stress     Vomiting     Weight gain       Social History:  Social History     Socioeconomic History    Marital status: Single   Tobacco Use    Smoking status: Former     Packs/day: 0.00     Years: 0.00     Additional pack years: 0.00     Total pack years: 0.00     Types: Cigarettes    Smokeless tobacco: Never    Tobacco comments:     vaping.    Vaping Use    Vaping Use: Former   Substance and Sexual Activity    Alcohol use: Yes     Alcohol/week: 14.0 standard drinks of alcohol     Types: 14 Standard drinks or equivalent per week     Comment: occ    Drug use: Not Currently   Other Topics Concern    Caffeine Concern Yes     Comment: 1 cup daily     Exercise Yes     Comment: walking    Seat Belt Yes    Special Diet No    Stress Concern No    Weight Concern Yes        BP Readings from Last 6 Encounters:   01/29/24 130/70   01/17/24 105/66   10/26/23 102/70   10/03/23 127/87   09/20/23 120/80    07/17/23 126/72       Wt Readings from Last 6 Encounters:   01/29/24 168 lb 4 oz (76.3 kg)   01/09/24 169 lb (76.7 kg)   10/26/23 187 lb 9.6 oz (85.1 kg)   10/03/23 180 lb (81.6 kg)   07/17/23 192 lb 2 oz (87.1 kg)   02/27/23 190 lb (86.2 kg)       REVIEW OF SYSTEMS:   GENERAL HEALTH: feels well no complaints other than above   SKIN: denies any unusual skin lesions or rashes  RESPIRATORY: denies shortness of breath    CARDIOVASCULAR: denies chest pain   GI: denies abdominal pain and denies heartburn  NEURO: denies headaches    EXAM:   /70 (BP Location: Left arm, Patient Position: Sitting, Cuff Size: large)   Pulse 87   Temp 98.8 °F (37.1 °C) (Temporal)   Resp 18   Wt 168 lb 4 oz (76.3 kg)   LMP 12/25/2023 (Exact Date)   SpO2 99%   BMI 28.88 kg/m²  Body mass index is 28.88 kg/m².      GENERAL: well developed, well nourished,in no apparent distress  SKIN: no rashes,no suspicious lesions  HEENT: atraumatic, normocephalic,ears and throat are clear  NECK: supple,no adenopathy   LUNGS: clear to auscultation  CARDIO: RRR without murmur  GI: good BS's,no masses, HSM or tenderness  EXTREMITIES: no cyanosis, clubbing or edema  Psych: normal affect, more energy than I've seen her with before     ASSESSMENT AND PLAN:     Encounter Diagnosis   Name Primary?    Class 1 obesity due to excess calories with body mass index (BMI) of 31.0 to 31.9 in adult, unspecified whether serious comorbidity present Yes       Diagnoses and all orders for this visit:    Class 1 obesity due to excess calories with body mass index (BMI) of 31.0 to 31.9 in adult, unspecified whether serious comorbidity present    Doing well with phentermine. Not due for script now, but will continue this since she's getting good results and the side effects are good.   Consider adhd eval in the future when we need to stop the med to consider another stimulant since she feels so good?       No orders of the defined types were placed in this  encounter.              Meds & Refills for this Visit:  Requested Prescriptions      No prescriptions requested or ordered in this encounter             The patient indicates understanding of these issues and agrees to the plan.

## 2024-01-31 ENCOUNTER — OFFICE VISIT (OUTPATIENT)
Dept: PODIATRY CLINIC | Facility: CLINIC | Age: 29
End: 2024-01-31
Payer: COMMERCIAL

## 2024-01-31 DIAGNOSIS — Z98.890 STATUS POST FOOT SURGERY: Primary | ICD-10-CM

## 2024-01-31 PROCEDURE — 99024 POSTOP FOLLOW-UP VISIT: CPT | Performed by: PODIATRIST

## 2024-02-04 NOTE — PROGRESS NOTES
Lory Son is a 28 year old female.   Chief Complaint   Patient presents with    Post-Op     2nd sx on 01/17/24- bilateral feet- soreness- itchy - patient denies pain          HPI:   Patient returns to clinic now 2 weeks status post surgery doing well overall still having some discomfort.  At today's visit reviewed nurse's history as taken above, allergies medications and medical history as documented below.  All changes duly noted  Allergies: Patient has no known allergies.   Current Outpatient Medications   Medication Sig Dispense Refill    Elastic Bandages & Supports (POST-OP SHOE/SOFT TOP WOMEN) Does not apply Misc Square toe post op shoe WL x2 2 each 0    HYDROcodone-acetaminophen 5-325 MG Oral Tab Take 1 tablet by mouth every 6 (six) hours as needed for Pain. 30 tablet 0    amoxicillin clavulanate 875-125 MG Oral Tab Take 1 tablet by mouth 2 (two) times daily. 10 tablet 0    Phentermine HCl 37.5 MG Oral Tab Take 1 tablet (37.5 mg total) by mouth before breakfast. 30 tablet 0    mupirocin 2 % External Ointment Apply topically to affected sites daily 15 g 0    ergocalciferol 1.25 MG (27195 UT) Oral Cap Take 1 capsule (50,000 Units total) by mouth once a week. 12 capsule 1    metFORMIN  MG Oral Tablet 24 Hr Take 1 tablet (500 mg total) by mouth 2 (two) times daily with meals. 180 tablet 0    hydrOXYzine 25 MG Oral Tab       dicyclomine 10 MG Oral Cap Take 1 capsule (10 mg total) by mouth 4 (four) times daily before meals and nightly.      SUMAtriptan (IMITREX) 25 MG Oral Tab Take 1 tablet (25 mg total) by mouth every 2 (two) hours as needed for Migraine. Use at onset; repeat once after 2 HRS-ONLY 2 IN 24 HR MAX 9 tablet 1      Past Medical History:   Diagnosis Date    Abdominal pain     Bad breath     Belching     Bleeding nose     Bloating     Blood in the stool     COVID-19     Decorative tattoo     Depression     Diarrhea, unspecified     Enlarged lymph node     Fatigue     Flatulence/gas  pain/belching     Food intolerance     Frequent urination     Frequent UTI     Headache disorder     History of depression     History of mental disorder     Hyperhidrosis     Pt pt \"recently diagnosed\"    Indigestion     Irregular bowel habits     Menses painful     Nausea     Night sweats     Pain with bowel movements     Stress     Vomiting     Weight gain       History reviewed. No pertinent surgical history.   Family History   Problem Relation Age of Onset    Anxiety Father     Substance Abuse Father     Bipolar Disorder Father     Other (Other) Father         substance abuse disorder    Diabetes Mother     Cancer Mother     Anxiety Mother     Other (Other) Mother         substance abuse disorder    Diabetes Sister     Anxiety Sister     Suicide History Sister     Anxiety Brother     OCD Brother     Suicide History Brother       Social History     Socioeconomic History    Marital status: Single   Tobacco Use    Smoking status: Former     Packs/day: 0.00     Years: 0.00     Additional pack years: 0.00     Total pack years: 0.00     Types: Cigarettes    Smokeless tobacco: Never    Tobacco comments:     vaping.    Vaping Use    Vaping Use: Former   Substance and Sexual Activity    Alcohol use: Yes     Alcohol/week: 14.0 standard drinks of alcohol     Types: 14 Standard drinks or equivalent per week     Comment: occ    Drug use: Not Currently   Other Topics Concern    Caffeine Concern Yes     Comment: 1 cup daily     Exercise Yes     Comment: walking    Seat Belt Yes    Special Diet No    Stress Concern No    Weight Concern Yes           REVIEW OF SYSTEMS:   Today reviewed systens as documented below  GENERAL HEALTH: feels well otherwise  SKIN: Refer to exam below  RESPIRATORY: denies shortness of breath with exertion  CARDIOVASCULAR: denies chest pain on exertion  GI: denies abdominal pain and denies heartburn  NEURO: denies headaches    EXAM:   LMP 12/25/2023 (Exact Date)   GENERAL: well developed, well  nourished, in no apparent distress  EXTREMITIES:   Skin incisions are well-healed there is no sign of infection noted.  There is removed no sign of infection no dehiscence  ASSESSMENT AND PLAN:   Diagnoses and all orders for this visit:    Status post foot surgery        Plan: Band-Aids antibiotic ointment was applied.  She will just keep Band-Aids and antibiotic ointment she can resume normal bathing take short showers to keep her feet out for the first week or so.  And I will see her in follow-up again 3 months  And is on 2 to 3 weeks.    The patient indicates understanding of these issues and agrees to the plan.    Arnaud Urrutia DPM

## 2024-02-15 ENCOUNTER — OFFICE VISIT (OUTPATIENT)
Dept: PODIATRY CLINIC | Facility: CLINIC | Age: 29
End: 2024-02-15
Payer: COMMERCIAL

## 2024-02-15 DIAGNOSIS — Z98.890 STATUS POST FOOT SURGERY: Primary | ICD-10-CM

## 2024-02-15 PROCEDURE — 99213 OFFICE O/P EST LOW 20 MIN: CPT | Performed by: PODIATRIST

## 2024-02-15 NOTE — PROGRESS NOTES
Lory Son is a 28 year old female.   Chief Complaint   Patient presents with    Post-Op     3rd  sx on 01/17/24 b/l feet - Some soreness - No drainage          HPI:   Returns to the clinic 1 month status post surgery no further drainage from her toes as little loosening skin around the edges.  At today's visit reviewed nurse's history as taken above, allergies medications and medical history as documented below.  All changes duly noted  Allergies: Patient has no known allergies.   Current Outpatient Medications   Medication Sig Dispense Refill    Elastic Bandages & Supports (POST-OP SHOE/SOFT TOP WOMEN) Does not apply Misc Square toe post op shoe WL x2 2 each 0    HYDROcodone-acetaminophen 5-325 MG Oral Tab Take 1 tablet by mouth every 6 (six) hours as needed for Pain. 30 tablet 0    amoxicillin clavulanate 875-125 MG Oral Tab Take 1 tablet by mouth 2 (two) times daily. 10 tablet 0    Phentermine HCl 37.5 MG Oral Tab Take 1 tablet (37.5 mg total) by mouth before breakfast. 30 tablet 0    mupirocin 2 % External Ointment Apply topically to affected sites daily 15 g 0    ergocalciferol 1.25 MG (00706 UT) Oral Cap Take 1 capsule (50,000 Units total) by mouth once a week. 12 capsule 1    metFORMIN  MG Oral Tablet 24 Hr Take 1 tablet (500 mg total) by mouth 2 (two) times daily with meals. 180 tablet 0    hydrOXYzine 25 MG Oral Tab       dicyclomine 10 MG Oral Cap Take 1 capsule (10 mg total) by mouth 4 (four) times daily before meals and nightly.      SUMAtriptan (IMITREX) 25 MG Oral Tab Take 1 tablet (25 mg total) by mouth every 2 (two) hours as needed for Migraine. Use at onset; repeat once after 2 HRS-ONLY 2 IN 24 HR MAX 9 tablet 1      Past Medical History:   Diagnosis Date    Abdominal pain     Bad breath     Belching     Bleeding nose     Bloating     Blood in the stool     COVID-19     Decorative tattoo     Depression     Diarrhea, unspecified     Enlarged lymph node     Fatigue     Flatulence/gas  pain/belching     Food intolerance     Frequent urination     Frequent UTI     Headache disorder     History of depression     History of mental disorder     Hyperhidrosis     Pt pt \"recently diagnosed\"    Indigestion     Irregular bowel habits     Menses painful     Nausea     Night sweats     Pain with bowel movements     Stress     Vomiting     Weight gain       History reviewed. No pertinent surgical history.   Family History   Problem Relation Age of Onset    Anxiety Father     Substance Abuse Father     Bipolar Disorder Father     Other (Other) Father         substance abuse disorder    Diabetes Mother     Cancer Mother     Anxiety Mother     Other (Other) Mother         substance abuse disorder    Diabetes Sister     Anxiety Sister     Suicide History Sister     Anxiety Brother     OCD Brother     Suicide History Brother       Social History     Socioeconomic History    Marital status: Single   Tobacco Use    Smoking status: Former     Packs/day: 0.00     Years: 0.00     Additional pack years: 0.00     Total pack years: 0.00     Types: Cigarettes    Smokeless tobacco: Never    Tobacco comments:     vaping.    Vaping Use    Vaping Use: Former   Substance and Sexual Activity    Alcohol use: Yes     Alcohol/week: 14.0 standard drinks of alcohol     Types: 14 Standard drinks or equivalent per week     Comment: occ    Drug use: Not Currently   Other Topics Concern    Caffeine Concern Yes     Comment: 1 cup daily     Exercise Yes     Comment: walking    Seat Belt Yes    Special Diet No    Stress Concern No    Weight Concern Yes           REVIEW OF SYSTEMS:   Today reviewed systens as documented below  GENERAL HEALTH: feels well otherwise  SKIN: Refer to exam below  RESPIRATORY: denies shortness of breath with exertion  CARDIOVASCULAR: denies chest pain on exertion  GI: denies abdominal pain and denies heartburn  NEURO: denies headaches    EXAM:   LMP 12/25/2023 (Exact Date)   GENERAL: well developed, well  nourished, in no apparent distress  EXTREMITIES:   Ankle incision sites are all healing uneventfully there is some dry scaly skin around the edges which is normal there is no drainage no sign of infection.  ASSESSMENT AND PLAN:   Diagnoses and all orders for this visit:    Status post foot surgery        Plan: Patient to just wash them thoroughly when she takes a shower best she can wear shoe gear as tolerated and then follow-up with us again as needed she is released from surgical management at today's visit.    The patient indicates understanding of these issues and agrees to the plan.    Arnaud Urrutia, ELY

## 2024-02-19 RX ORDER — PREDNISONE 20 MG/1
60 TABLET ORAL DAILY
COMMUNITY
Start: 2024-02-18 | End: 2024-02-22

## 2024-02-20 ENCOUNTER — OFFICE VISIT (OUTPATIENT)
Dept: FAMILY MEDICINE CLINIC | Facility: CLINIC | Age: 29
End: 2024-02-20
Payer: COMMERCIAL

## 2024-02-20 VITALS
RESPIRATION RATE: 20 BRPM | WEIGHT: 166.38 LBS | DIASTOLIC BLOOD PRESSURE: 80 MMHG | HEART RATE: 99 BPM | TEMPERATURE: 98 F | SYSTOLIC BLOOD PRESSURE: 120 MMHG | BODY MASS INDEX: 29 KG/M2 | OXYGEN SATURATION: 97 %

## 2024-02-20 DIAGNOSIS — T78.3XXD ANGIOEDEMA, SUBSEQUENT ENCOUNTER: ICD-10-CM

## 2024-02-20 DIAGNOSIS — E04.9 ENLARGED THYROID: Primary | ICD-10-CM

## 2024-02-20 DIAGNOSIS — E66.9 CLASS 1 OBESITY WITH BODY MASS INDEX (BMI) OF 31.0 TO 31.9 IN ADULT, UNSPECIFIED OBESITY TYPE, UNSPECIFIED WHETHER SERIOUS COMORBIDITY PRESENT: ICD-10-CM

## 2024-02-20 PROCEDURE — 3074F SYST BP LT 130 MM HG: CPT | Performed by: FAMILY MEDICINE

## 2024-02-20 PROCEDURE — 3079F DIAST BP 80-89 MM HG: CPT | Performed by: FAMILY MEDICINE

## 2024-02-20 PROCEDURE — 99214 OFFICE O/P EST MOD 30 MIN: CPT | Performed by: FAMILY MEDICINE

## 2024-02-20 RX ORDER — PHENTERMINE HYDROCHLORIDE 37.5 MG/1
37.5 TABLET ORAL
Qty: 30 TABLET | Refills: 0 | Status: SHIPPED | OUTPATIENT
Start: 2024-02-20

## 2024-02-20 NOTE — PROGRESS NOTES
Lory Son is a 28 year old female.  Chief Complaint   Patient presents with    Follow - Up     ER follow up swelling        HPI:   Had to go to ER for lip swelling Saturday night. Was already on benadryl already. Was given prednisone. Swelling down today finally.   Don't know what caused it.   Nothing had changed. No new food or beverages or new products.   Possibly from phentermine. But, she's been on that since October and it's working great.   Saturday she didn't eat as much as she normally does. Not sure if that was related.  Has 1 more day of prednisone.     Neck is swollen a little also. TSH was normal in October.       ALLERGIES:  No Known Allergies      Current Outpatient Medications   Medication Sig Dispense Refill    Phentermine HCl 37.5 MG Oral Tab Take 1 tablet (37.5 mg total) by mouth every morning before breakfast. Start 1/2 and increase to full tab if tolerated. 30 tablet 0    predniSONE 20 MG Oral Tab Take 3 tablets (60 mg total) by mouth daily.      mupirocin 2 % External Ointment Apply topically to affected sites daily 15 g 0    hydrOXYzine 25 MG Oral Tab       dicyclomine 10 MG Oral Cap Take 1 capsule (10 mg total) by mouth 4 (four) times daily before meals and nightly.      SUMAtriptan (IMITREX) 25 MG Oral Tab Take 1 tablet (25 mg total) by mouth every 2 (two) hours as needed for Migraine. Use at onset; repeat once after 2 HRS-ONLY 2 IN 24 HR MAX 9 tablet 1    Elastic Bandages & Supports (POST-OP SHOE/SOFT TOP WOMEN) Does not apply Misc Square toe post op shoe WL x2 (Patient not taking: Reported on 2/20/2024) 2 each 0    HYDROcodone-acetaminophen 5-325 MG Oral Tab Take 1 tablet by mouth every 6 (six) hours as needed for Pain. (Patient not taking: Reported on 2/20/2024) 30 tablet 0    amoxicillin clavulanate 875-125 MG Oral Tab Take 1 tablet by mouth 2 (two) times daily. (Patient not taking: Reported on 2/20/2024) 10 tablet 0    Phentermine HCl 37.5 MG Oral Tab Take 1 tablet (37.5 mg total)  by mouth before breakfast. (Patient not taking: Reported on 2/20/2024) 30 tablet 0    ergocalciferol 1.25 MG (72741 UT) Oral Cap Take 1 capsule (50,000 Units total) by mouth once a week. (Patient not taking: Reported on 2/20/2024) 12 capsule 1    metFORMIN  MG Oral Tablet 24 Hr Take 1 tablet (500 mg total) by mouth 2 (two) times daily with meals. (Patient not taking: Reported on 2/20/2024) 180 tablet 0      Past Medical History:   Diagnosis Date    Abdominal pain     Bad breath     Belching     Bleeding nose     Bloating     Blood in the stool     COVID-19     Decorative tattoo     Depression     Diarrhea, unspecified     Enlarged lymph node     Fatigue     Flatulence/gas pain/belching     Food intolerance     Frequent urination     Frequent UTI     Headache disorder     History of depression     History of mental disorder     Hyperhidrosis     Pt pt \"recently diagnosed\"    Indigestion     Irregular bowel habits     Menses painful     Nausea     Night sweats     Pain with bowel movements     Stress     Vomiting     Weight gain       Social History:  Social History     Socioeconomic History    Marital status: Single   Tobacco Use    Smoking status: Former     Packs/day: 0.00     Years: 0.00     Additional pack years: 0.00     Total pack years: 0.00     Types: Cigarettes    Smokeless tobacco: Never    Tobacco comments:     vaping.    Vaping Use    Vaping Use: Former   Substance and Sexual Activity    Alcohol use: Yes     Alcohol/week: 14.0 standard drinks of alcohol     Types: 14 Standard drinks or equivalent per week     Comment: occ    Drug use: Not Currently   Other Topics Concern    Caffeine Concern Yes     Comment: 1 cup daily     Exercise Yes     Comment: walking    Seat Belt Yes    Special Diet No    Stress Concern No    Weight Concern Yes        BP Readings from Last 6 Encounters:   02/20/24 120/80   01/29/24 130/70   01/17/24 105/66   10/26/23 102/70   10/03/23 127/87   09/20/23 120/80       Wt Readings  from Last 6 Encounters:   02/20/24 166 lb 6 oz (75.5 kg)   01/29/24 168 lb 4 oz (76.3 kg)   01/09/24 169 lb (76.7 kg)   10/26/23 187 lb 9.6 oz (85.1 kg)   10/03/23 180 lb (81.6 kg)   07/17/23 192 lb 2 oz (87.1 kg)       REVIEW OF SYSTEMS:   GENERAL HEALTH: feels well no complaints other than above   SKIN: denies any unusual skin lesions or rashes  RESPIRATORY: denies shortness of breath    CARDIOVASCULAR: denies chest pain on exertion  GI: denies abdominal pain and denies heartburn  NEURO: denies headaches or dizziness     EXAM:   /80 (BP Location: Left arm, Patient Position: Sitting, Cuff Size: large)   Pulse 99   Temp 98 °F (36.7 °C) (Temporal)   Resp 20   Wt 166 lb 6 oz (75.5 kg)   LMP 12/25/2023 (Exact Date)   SpO2 97%   BMI 28.56 kg/m²  Body mass index is 28.56 kg/m².      GENERAL: well developed, well nourished,in no apparent distress  SKIN: no rashes,no suspicious lesions  HEENT: atraumatic, normocephalic,ears and throat are clear, lips no longer swollen.   NECK: supple,no adenopathy, + mildly enlarged thyroid gland, R>L   LUNGS: clear to auscultation  CARDIO: RRR without murmur  GI: good BS's,no masses, HSM or tenderness  EXTREMITIES: no cyanosis, clubbing or edema    ASSESSMENT AND PLAN:     Encounter Diagnoses   Name Primary?    Enlarged thyroid Yes    Class 1 obesity with body mass index (BMI) of 31.0 to 31.9 in adult, unspecified obesity type, unspecified whether serious comorbidity present     Angioedema, subsequent encounter        Diagnoses and all orders for this visit:    Enlarged thyroid  -     US THYROID (CPT=76536); Future    Class 1 obesity with body mass index (BMI) of 31.0 to 31.9 in adult, unspecified obesity type, unspecified whether serious comorbidity present  -     Phentermine HCl 37.5 MG Oral Tab; Take 1 tablet (37.5 mg total) by mouth every morning before breakfast. Start 1/2 and increase to full tab if tolerated.    Angioedema, subsequent encounter    Can restart  phentermine 1/2 dose. If this happens again, then we know it was the cause.   Do not restart until after a few days after prednisone, make sure she doesn't have rebound swelling after prednisone before restarting.     Check thyroid.     No orders of the defined types were placed in this encounter.              Meds & Refills for this Visit:  Requested Prescriptions     Signed Prescriptions Disp Refills    Phentermine HCl 37.5 MG Oral Tab 30 tablet 0     Sig: Take 1 tablet (37.5 mg total) by mouth every morning before breakfast. Start 1/2 and increase to full tab if tolerated.             The patient indicates understanding of these issues and agrees to the plan.

## 2024-02-28 ENCOUNTER — HOSPITAL ENCOUNTER (OUTPATIENT)
Dept: ULTRASOUND IMAGING | Age: 29
Discharge: HOME | End: 2024-02-28
Attending: FAMILY MEDICINE
Payer: COMMERCIAL

## 2024-02-28 DIAGNOSIS — E04.9 ENLARGED THYROID: ICD-10-CM

## 2024-02-28 PROCEDURE — 76536 US EXAM OF HEAD AND NECK: CPT | Performed by: FAMILY MEDICINE

## 2024-02-29 ENCOUNTER — OFFICE VISIT (OUTPATIENT)
Dept: FAMILY MEDICINE CLINIC | Facility: CLINIC | Age: 29
End: 2024-02-29
Payer: COMMERCIAL

## 2024-02-29 VITALS
HEART RATE: 83 BPM | OXYGEN SATURATION: 97 % | DIASTOLIC BLOOD PRESSURE: 60 MMHG | TEMPERATURE: 98 F | WEIGHT: 165.25 LBS | RESPIRATION RATE: 18 BRPM | BODY MASS INDEX: 28 KG/M2 | SYSTOLIC BLOOD PRESSURE: 100 MMHG

## 2024-02-29 DIAGNOSIS — E66.9 CLASS 1 OBESITY WITH BODY MASS INDEX (BMI) OF 31.0 TO 31.9 IN ADULT, UNSPECIFIED OBESITY TYPE, UNSPECIFIED WHETHER SERIOUS COMORBIDITY PRESENT: ICD-10-CM

## 2024-02-29 DIAGNOSIS — Z11.3 SCREENING FOR STD (SEXUALLY TRANSMITTED DISEASE): Primary | ICD-10-CM

## 2024-02-29 PROCEDURE — 87389 HIV-1 AG W/HIV-1&-2 AB AG IA: CPT | Performed by: FAMILY MEDICINE

## 2024-02-29 PROCEDURE — 87591 N.GONORRHOEAE DNA AMP PROB: CPT | Performed by: FAMILY MEDICINE

## 2024-02-29 PROCEDURE — 86803 HEPATITIS C AB TEST: CPT | Performed by: FAMILY MEDICINE

## 2024-02-29 PROCEDURE — 87491 CHLMYD TRACH DNA AMP PROBE: CPT | Performed by: FAMILY MEDICINE

## 2024-02-29 PROCEDURE — 3074F SYST BP LT 130 MM HG: CPT | Performed by: FAMILY MEDICINE

## 2024-02-29 PROCEDURE — 3078F DIAST BP <80 MM HG: CPT | Performed by: FAMILY MEDICINE

## 2024-02-29 PROCEDURE — 99214 OFFICE O/P EST MOD 30 MIN: CPT | Performed by: FAMILY MEDICINE

## 2024-02-29 PROCEDURE — 87340 HEPATITIS B SURFACE AG IA: CPT | Performed by: FAMILY MEDICINE

## 2024-02-29 PROCEDURE — 86780 TREPONEMA PALLIDUM: CPT | Performed by: FAMILY MEDICINE

## 2024-02-29 PROCEDURE — 86706 HEP B SURFACE ANTIBODY: CPT | Performed by: FAMILY MEDICINE

## 2024-02-29 NOTE — PROGRESS NOTES
Lory Son is a 28 year old female.  Chief Complaint   Patient presents with    STD     Testing        HPI:   With a new partner. Would like to get checked for STI's.     Urine smells funny, but otherwise no symptoms. No discharge, itching or burning.     Not on birth control. Using condoms. Okay with getting pregnant if that happpens.     Restarted phentermine last week. Feeling fine. So far. Had stopped it after a angioedema episode.     ALLERGIES:  No Known Allergies      Current Outpatient Medications   Medication Sig Dispense Refill    Phentermine HCl 37.5 MG Oral Tab Take 1 tablet (37.5 mg total) by mouth every morning before breakfast. Start 1/2 and increase to full tab if tolerated. 30 tablet 0    hydrOXYzine 25 MG Oral Tab       dicyclomine 10 MG Oral Cap Take 1 capsule (10 mg total) by mouth 4 (four) times daily before meals and nightly.      SUMAtriptan (IMITREX) 25 MG Oral Tab Take 1 tablet (25 mg total) by mouth every 2 (two) hours as needed for Migraine. Use at onset; repeat once after 2 HRS-ONLY 2 IN 24 HR MAX 9 tablet 1    Elastic Bandages & Supports (POST-OP SHOE/SOFT TOP WOMEN) Does not apply Misc Square toe post op shoe WL x2 (Patient not taking: Reported on 2/20/2024) 2 each 0    HYDROcodone-acetaminophen 5-325 MG Oral Tab Take 1 tablet by mouth every 6 (six) hours as needed for Pain. (Patient not taking: Reported on 2/20/2024) 30 tablet 0    amoxicillin clavulanate 875-125 MG Oral Tab Take 1 tablet by mouth 2 (two) times daily. (Patient not taking: Reported on 2/20/2024) 10 tablet 0    Phentermine HCl 37.5 MG Oral Tab Take 1 tablet (37.5 mg total) by mouth before breakfast. (Patient not taking: Reported on 2/29/2024) 30 tablet 0    mupirocin 2 % External Ointment Apply topically to affected sites daily (Patient not taking: Reported on 2/29/2024) 15 g 0    ergocalciferol 1.25 MG (80121 UT) Oral Cap Take 1 capsule (50,000 Units total) by mouth once a week. (Patient not taking: Reported on  2/20/2024) 12 capsule 1    metFORMIN  MG Oral Tablet 24 Hr Take 1 tablet (500 mg total) by mouth 2 (two) times daily with meals. (Patient not taking: Reported on 2/20/2024) 180 tablet 0      Past Medical History:   Diagnosis Date    Abdominal pain     Bad breath     Belching     Bleeding nose     Bloating     Blood in the stool     COVID-19     Decorative tattoo     Depression     Diarrhea, unspecified     Enlarged lymph node     Fatigue     Flatulence/gas pain/belching     Food intolerance     Frequent urination     Frequent UTI     Headache disorder     History of depression     History of mental disorder     Hyperhidrosis     Pt pt \"recently diagnosed\"    Indigestion     Irregular bowel habits     Menses painful     Nausea     Night sweats     Pain with bowel movements     Stress     Vomiting     Weight gain       Social History:  Social History     Socioeconomic History    Marital status: Single   Tobacco Use    Smoking status: Former     Packs/day: 0.00     Years: 0.00     Additional pack years: 0.00     Total pack years: 0.00     Types: Cigarettes    Smokeless tobacco: Never    Tobacco comments:     vaping.    Vaping Use    Vaping Use: Former   Substance and Sexual Activity    Alcohol use: Yes     Alcohol/week: 14.0 standard drinks of alcohol     Types: 14 Standard drinks or equivalent per week     Comment: occ    Drug use: Not Currently   Other Topics Concern    Caffeine Concern Yes     Comment: 1 cup daily     Exercise Yes     Comment: walking    Seat Belt Yes    Special Diet No    Stress Concern No    Weight Concern Yes        BP Readings from Last 6 Encounters:   02/29/24 100/60   02/20/24 120/80   01/29/24 130/70   01/17/24 105/66   10/26/23 102/70   10/03/23 127/87       Wt Readings from Last 6 Encounters:   02/29/24 165 lb 4 oz (75 kg)   02/20/24 166 lb 6 oz (75.5 kg)   01/29/24 168 lb 4 oz (76.3 kg)   01/09/24 169 lb (76.7 kg)   10/26/23 187 lb 9.6 oz (85.1 kg)   10/03/23 180 lb (81.6 kg)        REVIEW OF SYSTEMS:   GENERAL HEALTH: feels well no complaints other than above   SKIN: denies any unusual skin lesions or rashes  RESPIRATORY: denies shortness of breath    CARDIOVASCULAR: denies chest pain    GI: denies abdominal pain and denies heartburn  : see HPI   NEURO: denies headaches    EXAM:   /60 (BP Location: Left arm, Patient Position: Sitting, Cuff Size: adult)   Pulse 83   Temp 98.3 °F (36.8 °C) (Temporal)   Resp 18   Wt 165 lb 4 oz (75 kg)   LMP 02/20/2024 (Approximate)   SpO2 97%   BMI 28.37 kg/m²  Body mass index is 28.37 kg/m².      GENERAL: well developed, well nourished,in no apparent distress  SKIN: no rashes,no suspicious lesions  HEENT: atraumatic, normocephalic,ears and throat are clear  Lymph: no adenopathy in groin, supraclavicular are anterior cervical area. Small node in left submandibular area   NECK: supple,no adenopathy,   LUNGS: clear to auscultation  CARDIO: RRR without murmur  GI: good BS's,no masses, HSM or tenderness  EXTREMITIES: no cyanosis, clubbing or edema    ASSESSMENT AND PLAN:     Encounter Diagnoses   Name Primary?    Screening for STD (sexually transmitted disease) Yes    Class 1 obesity with body mass index (BMI) of 31.0 to 31.9 in adult, unspecified obesity type, unspecified whether serious comorbidity present        Diagnoses and all orders for this visit:    Screening for STD (sexually transmitted disease)  -     Cancel: Vaginitis/Vaginosis, Dna Probe; Future  -     T Pallidum Screening [place]; Future  -     HIV AG AB Combo (Consent Obtained prechecked); Future  -     Urine Chlamydia/GC Amplification; Future  -     Hepatitis B Surface Antibody; Future  -     Hepatitis B Surface Antigen; Future  -     HCV Antibody; Future    Class 1 obesity with body mass index (BMI) of 31.0 to 31.9 in adult, unspecified obesity type, unspecified whether serious comorbidity present    Doing well with phentermine.     Orders Placed This Encounter   Procedures     T Pallidum Screening [place]     Standing Status:   Future     Number of Occurrences:   1     Standing Expiration Date:   2/28/2025     Order Specific Question:   Release to patient     Answer:   Immediate    HIV AG AB Combo (Consent Obtained prechecked)     Standing Status:   Future     Number of Occurrences:   1     Standing Expiration Date:   2/28/2025     Order Specific Question:   Consent obtained?     Answer:   Yes    Hepatitis B Surface Antibody     Standing Status:   Future     Number of Occurrences:   1     Standing Expiration Date:   2/28/2025     Order Specific Question:   Release to patient     Answer:   Immediate    Hepatitis B Surface Antigen     Standing Status:   Future     Number of Occurrences:   1     Standing Expiration Date:   2/28/2025     Order Specific Question:   Release to patient     Answer:   Immediate    HCV Antibody     Standing Status:   Future     Number of Occurrences:   1     Standing Expiration Date:   2/28/2025     Order Specific Question:   Release to patient     Answer:   Immediate    HIV Ag/Ab Combo    HIV Ag/Ab Combo [place] [facility]               Meds & Refills for this Visit:  Requested Prescriptions      No prescriptions requested or ordered in this encounter             The patient indicates understanding of these issues and agrees to the plan.

## 2024-03-01 LAB
C TRACH DNA SPEC QL NAA+PROBE: NEGATIVE
HBV SURFACE AB SER QL: NONREACTIVE
HBV SURFACE AB SERPL IA-ACNC: <3.1 MIU/ML
HBV SURFACE AG SER-ACNC: <0.1 [IU]/L
HBV SURFACE AG SERPL QL IA: NONREACTIVE
HCV AB SERPL QL IA: NONREACTIVE
N GONORRHOEA DNA SPEC QL NAA+PROBE: NEGATIVE
T PALLIDUM AB SER QL IA: NONREACTIVE

## 2024-04-12 ENCOUNTER — OFFICE VISIT (OUTPATIENT)
Dept: FAMILY MEDICINE CLINIC | Facility: CLINIC | Age: 29
End: 2024-04-12
Payer: MEDICAID

## 2024-04-12 ENCOUNTER — TELEPHONE (OUTPATIENT)
Dept: OBGYN CLINIC | Facility: CLINIC | Age: 29
End: 2024-04-12

## 2024-04-12 VITALS
BODY MASS INDEX: 29 KG/M2 | OXYGEN SATURATION: 98 % | TEMPERATURE: 99 F | DIASTOLIC BLOOD PRESSURE: 72 MMHG | RESPIRATION RATE: 20 BRPM | SYSTOLIC BLOOD PRESSURE: 118 MMHG | WEIGHT: 166.63 LBS | HEART RATE: 102 BPM

## 2024-04-12 DIAGNOSIS — N91.2 AMENORRHEA: Primary | ICD-10-CM

## 2024-04-12 DIAGNOSIS — Z3A.01 LESS THAN 8 WEEKS GESTATION OF PREGNANCY (HCC): Primary | ICD-10-CM

## 2024-04-12 NOTE — PROGRESS NOTES
Lory Son is a 28 year old female.  Chief Complaint   Patient presents with    Pregnancy       HPI:   FDLMP: 3/6/24.     She is 5 wks 2 days pregnant based on LMP.   Feeling tired and hungry. Thirsty.     Sunday had ibuprofen and had another allergic reaction. She went to the ER for treatment.   She hadn't taken phentermine that day.   She thinks ibuprofen is the common thing.   In ER they asked about possibility of pregnancy. They tested and she was positive. Boyfriend is excited. Pt is scared.   She has not taken phentermine since then.       ALLERGIES:  Allergies   Allergen Reactions    Nsaids HIVES and SWELLING     Angioedema upper lip         Current Outpatient Medications   Medication Sig Dispense Refill    SUMAtriptan (IMITREX) 25 MG Oral Tab Take 1 tablet (25 mg total) by mouth every 2 (two) hours as needed for Migraine. Use at onset; repeat once after 2 HRS-ONLY 2 IN 24 HR MAX 9 tablet 1    Elastic Bandages & Supports (POST-OP SHOE/SOFT TOP WOMEN) Does not apply Misc Square toe post op shoe WL x2 (Patient not taking: Reported on 2/20/2024) 2 each 0    mupirocin 2 % External Ointment Apply topically to affected sites daily (Patient not taking: Reported on 2/29/2024) 15 g 0    ergocalciferol 1.25 MG (81433 UT) Oral Cap Take 1 capsule (50,000 Units total) by mouth once a week. (Patient not taking: Reported on 2/20/2024) 12 capsule 1    hydrOXYzine 25 MG Oral Tab  (Patient not taking: Reported on 4/12/2024)        Past Medical History:    Abdominal pain    Bad breath    Belching    Bleeding nose    Bloating    Blood in the stool    COVID-19    Decorative tattoo    Depression    Diarrhea, unspecified    Enlarged lymph node    Fatigue    Flatulence/gas pain/belching    Food intolerance    Frequent urination    Frequent UTI    Headache disorder    History of depression    History of mental disorder    Hyperhidrosis    Pt pt \"recently diagnosed\"    Indigestion    Irregular bowel habits    Menses painful     Nausea    Night sweats    Pain with bowel movements    Stress    Vomiting    Weight gain      Social History:  Social History     Socioeconomic History    Marital status: Single   Tobacco Use    Smoking status: Former     Current packs/day: 0.00     Types: Cigarettes    Smokeless tobacco: Never    Tobacco comments:     vaping.    Vaping Use    Vaping status: Former   Substance and Sexual Activity    Alcohol use: Yes     Alcohol/week: 14.0 standard drinks of alcohol     Types: 14 Standard drinks or equivalent per week     Comment: occ    Drug use: Not Currently   Other Topics Concern    Caffeine Concern Yes     Comment: 1 cup daily     Exercise Yes     Comment: walking    Seat Belt Yes    Special Diet No    Stress Concern No    Weight Concern Yes     Social Determinants of Health     Financial Resource Strain: Not on File (4/10/2024)    Received from [facility] [employer]     Financial Resource Strain     Financial Resource Strain: 0   Food Insecurity: Not on File (4/10/2024)    Received from [facility]     Food Insecurity     Food: 0   Transportation Needs: Not on File (4/10/2024)    Received from [facility]     Transportation Needs     Transportation: 0   Physical Activity: Not on File (4/10/2024)    Received from [facility] [employer]     Physical Activity     Physical Activity: 0   Stress: Not on File (4/10/2024)    Received from [facility]     Stress     Stress: 0   Social Connections: Not on File (4/10/2024)    Received from [facility]     Social Connections     Social Connections and Isolation: 0   Housing Stability: Not on File (4/10/2024)    Received from [facility] [employer]     Housing Stability     Housin        BP Readings from Last 6 Encounters:   24 118/72   24 100/60   24 120/80   24 130/70   24 105/66   10/26/23 102/70       Wt Readings from Last 6 Encounters:   24 166 lb 9.6 oz (75.6 kg)   24 165 lb 4 oz (75 kg)   24 166 lb 6 oz (75.5 kg)   24 168 lb 4 oz (76.3 kg)   24 169 lb (76.7 kg)   10/26/23 187  lb 9.6 oz (85.1 kg)       REVIEW OF SYSTEMS:   GENERAL HEALTH: feels well no complaints other than above   SKIN: denies any unusual skin lesions or rashes  RESPIRATORY: denies shortness of breath    CARDIOVASCULAR: denies chest pain    GI: denies abdominal pain and denies heartburn  NEURO: denies headaches    EXAM:   /72 (BP Location: Left arm, Patient Position: Sitting, Cuff Size: large)   Pulse 102   Temp 99.2 °F (37.3 °C) (Temporal)   Resp 20   Wt 166 lb 9.6 oz (75.6 kg)   LMP 03/06/2024 (Approximate)   SpO2 98%   BMI 28.60 kg/m²  Body mass index is 28.6 kg/m².      GENERAL: well developed, well nourished,in no apparent distress  SKIN: no rashes,no suspicious lesions  HEENT: atraumatic, normocephalic,   NECK: supple,no adenopathy,   LUNGS: clear to auscultation  CARDIO: RRR without murmur  GI: good BS's,no masses, HSM or tenderness  EXTREMITIES: no cyanosis, clubbing or edema    ASSESSMENT AND PLAN:     Encounter Diagnosis   Name Primary?    Less than 8 weeks gestation of pregnancy (HCC) Yes       Diagnoses and all orders for this visit:    Less than 8 weeks gestation of pregnancy (HCC)  -     OBG Referral - In Network    Start prenatal.   Do not take phentermine, drink alcohol or smoke pot.   Schedule to see OB.   Call with questions.     No orders of the defined types were placed in this encounter.              Meds & Refills for this Visit:  Requested Prescriptions      No prescriptions requested or ordered in this encounter             The patient indicates understanding of these issues and agrees to the plan.

## 2024-04-12 NOTE — TELEPHONE ENCOUNTER
Patient calling to initiate prenatal care  LMP 3/6  Patient is 7-8 weeks on 4/24-5/1  Confirmation Ultrasound and Appointment scheduled on   Future Appointments   Date Time Provider Department Center   5/10/2024  1:45 PM [facility] [place]   5/10/2024  2:30 PM Isaac Mae MD [facility] [place]   6/6/2024 10:00 AM Roys Arnold APRN [facility] [place]         Any history of ectopic pregnancy? no  Any history of miscarriage? no  Any medications that you are taking on a regular basis other than prenatal vitamins? no (if not taking prenatal vitamins, encourage patient to start taking.)  Any bleeding since the first day of last LMP and your positive pregnancy test? no    Insurance medicaid

## 2024-05-10 ENCOUNTER — OFFICE VISIT (OUTPATIENT)
Dept: OBGYN CLINIC | Facility: CLINIC | Age: 29
End: 2024-05-10

## 2024-05-10 ENCOUNTER — ULTRASOUND ENCOUNTER (OUTPATIENT)
Dept: OBGYN CLINIC | Facility: CLINIC | Age: 29
End: 2024-05-10

## 2024-05-10 ENCOUNTER — LAB ENCOUNTER (OUTPATIENT)
Dept: LAB | Age: 29
End: 2024-05-10
Attending: STUDENT IN AN ORGANIZED HEALTH CARE EDUCATION/TRAINING PROGRAM
Payer: MEDICAID

## 2024-05-10 ENCOUNTER — TELEPHONE (OUTPATIENT)
Dept: OBGYN CLINIC | Facility: CLINIC | Age: 29
End: 2024-05-10

## 2024-05-10 VITALS
SYSTOLIC BLOOD PRESSURE: 116 MMHG | HEART RATE: 85 BPM | WEIGHT: 172.19 LBS | DIASTOLIC BLOOD PRESSURE: 74 MMHG | BODY MASS INDEX: 30 KG/M2

## 2024-05-10 DIAGNOSIS — O36.80X0 PREGNANCY, LOCATION UNKNOWN (HCC): Primary | ICD-10-CM

## 2024-05-10 DIAGNOSIS — O36.80X0 PREGNANCY OF UNKNOWN ANATOMIC LOCATION (HCC): Primary | ICD-10-CM

## 2024-05-10 DIAGNOSIS — O36.80X0 PREGNANCY OF UNKNOWN ANATOMIC LOCATION (HCC): ICD-10-CM

## 2024-05-10 DIAGNOSIS — N91.2 AMENORRHEA: ICD-10-CM

## 2024-05-10 LAB — B-HCG SERPL-ACNC: ABNORMAL MIU/ML

## 2024-05-10 PROCEDURE — 99213 OFFICE O/P EST LOW 20 MIN: CPT | Performed by: STUDENT IN AN ORGANIZED HEALTH CARE EDUCATION/TRAINING PROGRAM

## 2024-05-10 PROCEDURE — 76856 US EXAM PELVIC COMPLETE: CPT | Performed by: STUDENT IN AN ORGANIZED HEALTH CARE EDUCATION/TRAINING PROGRAM

## 2024-05-10 PROCEDURE — 84702 CHORIONIC GONADOTROPIN TEST: CPT

## 2024-05-10 PROCEDURE — 36415 COLL VENOUS BLD VENIPUNCTURE: CPT

## 2024-05-10 NOTE — TELEPHONE ENCOUNTER
Patient needs repeat US on 5/30 with MD appt to follow.  There are no appts available for US or MD appt after on that day  I have 1 US appointment open on Tues 5/21 but no open provider appointment.  Can you put in an order for radiology?

## 2024-05-10 NOTE — PROGRESS NOTES
GYN PROBLEM VISIT    Subjective:   This is a 28 year old  presenting for GYN visit.     Due to pelvic pain she presented to the ED while out of town. On  her hcg was 29, 442. She had an ultrasound which showed a GS without YS or FP. Mean sac diameter was 1.9cm.    On  her ultrasound was repeated and again showed a GS without YS or FP.     She then had a repeat hcg and was told her hormone level went up by [address].     She is here for f/u today.     She denies any further pain or bleeding.     LMP 3/6. Blood type is O+    Review of Systems   Constitutional: Negative.    HENT: Negative.    Respiratory: Negative.    Gastrointestinal: Negative.    Endocrine: Negative.    Genitourinary: as above  Musculoskeletal: Negative.    Skin: Negative.    Allergic/Immunologic: Negative.    Neurological: Negative.      Past Medical History:    Abdominal pain    Bad breath    Belching    Bleeding nose    Bloating    Blood in the stool    COVID-19    Decorative tattoo    Depression    Diarrhea, unspecified    Enlarged lymph node    Fatigue    Flatulence/gas pain/belching    Food intolerance    Frequent urination    Frequent UTI    Headache disorder    History of depression    History of mental disorder    Hyperhidrosis    Pt pt \"recently diagnosed\"    Indigestion    Irregular bowel habits    Menses painful    Nausea    Night sweats    Pain with bowel movements    Stress    Vomiting    Weight gain       History reviewed. No pertinent surgical history.    Allergies   Allergen Reactions    Nsaids HIVES and SWELLING     Angioedema upper lip        SUMAtriptan (IMITREX) 25 MG Oral Tab Take 1 tablet (25 mg total) by mouth every 2 (two) hours as needed for Migraine. Use at onset; repeat once after 2 HRS-ONLY 2 IN 24 HR MAX 9 tablet 1         Objective:    Physical Exam     Vitals:    05/10/24 1337   BP: 116/74   Pulse: 85        Constitutional: She is oriented to person, place, and time. She appears well-developed and  well-nourished.     Assessment/Plan:  This is a 28 year old  presenting with PUL, possible anembryonic pregnancy.     -repeat hcg per pt request  -repeat ultrasound 14 days from first ultrasound as it was a GS w/o YS.  -pain and bleeding precautions.    Isaac Mae MD

## 2024-05-15 ENCOUNTER — PATIENT MESSAGE (OUTPATIENT)
Dept: OBGYN CLINIC | Facility: CLINIC | Age: 29
End: 2024-05-15

## 2024-05-15 NOTE — TELEPHONE ENCOUNTER
From: Lory Son  To: Isaac Mae  Sent: 5/15/2024 8:55 AM CDT  Subject: Bleeding    Hi, I was wondering if it were possible to get a note for work? I am home today as I just started bleeding and cramping really bad. Instead of going in for an appointment for what is suppose to happen, is there a way you can excuse me from work today?

## 2024-06-10 ENCOUNTER — PATIENT MESSAGE (OUTPATIENT)
Dept: FAMILY MEDICINE CLINIC | Facility: CLINIC | Age: 29
End: 2024-06-10

## 2024-06-10 DIAGNOSIS — E66.9 CLASS 1 OBESITY WITH BODY MASS INDEX (BMI) OF 31.0 TO 31.9 IN ADULT, UNSPECIFIED OBESITY TYPE, UNSPECIFIED WHETHER SERIOUS COMORBIDITY PRESENT: ICD-10-CM

## 2024-06-11 NOTE — TELEPHONE ENCOUNTER
From: Lory oSn  To: Ely Ramirez  Sent: 6/10/2024 9:14 PM CDT  Subject: Phentermine    Hi Dr. Ramirez. Am I able to restart Phentermine if I am now no longer bleeding and testing negative for pregnancy?

## 2024-06-12 DIAGNOSIS — E66.9 CLASS 1 OBESITY WITH BODY MASS INDEX (BMI) OF 31.0 TO 31.9 IN ADULT, UNSPECIFIED OBESITY TYPE, UNSPECIFIED WHETHER SERIOUS COMORBIDITY PRESENT: ICD-10-CM

## 2024-06-12 RX ORDER — PHENTERMINE HYDROCHLORIDE 37.5 MG/1
TABLET ORAL
Qty: 30 TABLET | Refills: 0 | OUTPATIENT
Start: 2024-06-12

## 2024-06-13 RX ORDER — PHENTERMINE HYDROCHLORIDE 37.5 MG/1
37.5 TABLET ORAL
Qty: 30 TABLET | Refills: 0 | Status: SHIPPED | OUTPATIENT
Start: 2024-06-13

## 2024-06-25 ENCOUNTER — TELEPHONE (OUTPATIENT)
Dept: OBGYN CLINIC | Facility: CLINIC | Age: 29
End: 2024-06-25

## 2024-07-30 ENCOUNTER — TELEPHONE (OUTPATIENT)
Dept: PODIATRY CLINIC | Facility: CLINIC | Age: 29
End: 2024-07-30

## 2024-07-30 ENCOUNTER — OFFICE VISIT (OUTPATIENT)
Dept: PODIATRY CLINIC | Facility: CLINIC | Age: 29
End: 2024-07-30

## 2024-07-30 DIAGNOSIS — E66.9 CLASS 1 OBESITY WITH BODY MASS INDEX (BMI) OF 31.0 TO 31.9 IN ADULT, UNSPECIFIED OBESITY TYPE, UNSPECIFIED WHETHER SERIOUS COMORBIDITY PRESENT: ICD-10-CM

## 2024-07-30 DIAGNOSIS — L03.032 PARONYCHIA OF TOE OF LEFT FOOT: Primary | ICD-10-CM

## 2024-07-30 RX ORDER — PHENTERMINE HYDROCHLORIDE 37.5 MG/1
TABLET ORAL
Qty: 30 TABLET | Refills: 0 | Status: SHIPPED | OUTPATIENT
Start: 2024-07-30

## 2024-07-30 RX ORDER — DOXYCYCLINE HYCLATE 100 MG/1
100 CAPSULE ORAL 2 TIMES DAILY
Qty: 30 CAPSULE | Refills: 0 | Status: SHIPPED | OUTPATIENT
Start: 2024-07-30

## 2024-07-30 NOTE — TELEPHONE ENCOUNTER
Controlled Substance Medication [id number]/30/2024 03:28 PM    This medication is a controlled substance - forward to provider to refill        LOV 4/12/24     Last Refill 6/13/24     Future Appointments   Date Time Provider Department Center   8/22/2024 11:30 AM Arnaud Urrutia DPM [id number]

## 2024-07-30 NOTE — TELEPHONE ENCOUNTER
Patient states she had surgery on left foot in January and the toe she had surgery on is very swollen and looks infected. She states pus is coming out. Patient scheduled appointment for 8/22 but was asking if she should be seen sooner. Please call.

## 2024-07-30 NOTE — TELEPHONE ENCOUNTER
Spoke with patient. Endorses that had bilateral toe surgery in January and was doing great but a month ago started having swelling, redness and pain to her left big toe. There is thick white exudate coming out of the left side of the toenail. There is pain and pressure. States the redness and swelling are localized to toe. States she expresses this white exudate every night. Has applied Mupirocin ointment intermittently with no resolution. Denies fever/chills. States right hallux is ok aside from soreness at the joint. She made an appointment online for 8/22/24.    Spoke with MD who indicated to get her in this afternoon, called patient back and she booked appointment for 3 pm. Location confirmed.

## 2024-08-02 NOTE — PROGRESS NOTES
Lory Son is a 28 year old female.   Chief Complaint   Patient presents with    Toe Pain     S/p bilateral hallux sx on 1/17/2024- stated noticed swelling and drainage in the L hallux a month ago- lateral border- rates pain 7/10 on and off- pt treating it peroxide and mupirocin ointment         HPI:   This pleasant patient returns to the clinic she had previously had sharp onychoplasty's on both borders of both great toes but recently she started to get drainage from the lateral nail border with some discomfort on her left hallux.  She took a picture of it which was actually a short video and showed it to me she squeezes the area and purulent drainage comes out along the adjacent side of the nail lip.  At today's visit reviewed nurse's history as taken above, allergies medications and medical history as documented below.  All changes duly noted  Allergies: Nsaids   Current Outpatient Medications   Medication Sig Dispense Refill    doxycycline 100 MG Oral Cap Take 1 capsule (100 mg total) by mouth 2 (two) times daily. 30 capsule 0    PHENTERMINE HCL 37.5 MG Oral Tab TAKE 1 TABLET(37.5 MG) BY MOUTH EVERY MORNING BEFORE BREAKFAST. START ONE-HALF AND INCREASE TO 1 TABLET IF TOLERATED 30 tablet 0    Elastic Bandages & Supports (POST-OP SHOE/SOFT TOP WOMEN) Does not apply Misc Square toe post op shoe WL x2 (Patient not taking: Reported on 2/20/2024) 2 each 0    mupirocin 2 % External Ointment Apply topically to affected sites daily (Patient not taking: Reported on 2/29/2024) 15 g 0    ergocalciferol 1.25 MG (96931 UT) Oral Cap Take 1 capsule (50,000 Units total) by mouth once a week. (Patient not taking: Reported on 2/20/2024) 12 capsule 1    hydrOXYzine 25 MG Oral Tab  (Patient not taking: Reported on 4/12/2024)      SUMAtriptan (IMITREX) 25 MG Oral Tab Take 1 tablet (25 mg total) by mouth every 2 (two) hours as needed for Migraine. Use at onset; repeat once after 2 HRS-ONLY 2 IN 24 HR MAX 9 tablet 1      Past  Medical History:    Abdominal pain    Bad breath    Belching    Bleeding nose    Bloating    Blood in the stool    COVID-19    Decorative tattoo    Depression    Diarrhea, unspecified    Enlarged lymph node    Fatigue    Flatulence/gas pain/belching    Food intolerance    Frequent urination    Frequent UTI    Headache disorder    History of depression    History of mental disorder    Hyperhidrosis    Pt pt \"recently diagnosed\"    Indigestion    Irregular bowel habits    Menses painful    Nausea    Night sweats    Pain with bowel movements    Stress    Vomiting    Weight gain      History reviewed. No pertinent surgical history.   Family History   Problem Relation Age of Onset    Anxiety Father     Substance Abuse Father     Bipolar Disorder Father     Other (Other) Father         substance abuse disorder    Diabetes Mother     Cancer Mother     Anxiety Mother     Other (Other) Mother         substance abuse disorder    Diabetes Sister     Anxiety Sister     Suicide History Sister     Anxiety Brother     OCD Brother     Suicide History Brother       Social History     Socioeconomic History    Marital status: Single   Tobacco Use    Smoking status: Former     Current packs/day: 0.00     Types: Cigarettes    Smokeless tobacco: Never    Tobacco comments:     vaping.    Vaping Use    Vaping status: Former   Substance and Sexual Activity    Alcohol use: Not Currently     Alcohol/week: 14.0 standard drinks of alcohol     Types: 14 Standard drinks or equivalent per week     Comment: occ    Drug use: Not Currently    Sexual activity: Yes   Other Topics Concern    Caffeine Concern Yes     Comment: 1 cup daily     Exercise Yes     Comment: walking    Seat Belt Yes    Special Diet No    Stress Concern No    Weight Concern Yes           REVIEW OF SYSTEMS:   Today reviewed systens as documented below  GENERAL HEALTH: feels well otherwise  SKIN: Refer to exam below  RESPIRATORY: denies shortness of breath with  exertion  CARDIOVASCULAR: denies chest pain on exertion  GI: denies abdominal pain and denies heartburn  NEURO: denies headaches    EXAM:   LMP 03/06/2024 (Exact Date)   GENERAL: well developed, well nourished, in no apparent distress  EXTREMITIES:   1. Integument: The skin on her left foot was evaluated its warm and dry there is a lot of erythema and edema but there is some drainage noted on the lateral nail lip.   2. Vascular: Patient has palpable pulses dorsalis pedis posterior tibial on the left   3. Neurologic: Has intact sensorium no deficits are noted   4. Musculoskeletal: Good muscle strength    ASSESSMENT AND PLAN:   Diagnoses and all orders for this visit:    Paronychia of toe of left foot    Other orders  -     doxycycline 100 MG Oral Cap; Take 1 capsule (100 mg total) by mouth 2 (two) times daily.        Plan: Place the patient on doxycycline and foot soaks she can apply mupirocin ointment and a Band-Aid after the soaks I will see her again in a week or 2 we may have to go in but this would be a sharp procedure because if there is recurrent nail edge it is deep underneath the skin.  Will discuss further when I see her in the week or 2.    The patient indicates understanding of these issues and agrees to the plan.    Arnaud Urrutia DPM

## 2024-08-04 ENCOUNTER — OFFICE VISIT (OUTPATIENT)
Dept: FAMILY MEDICINE CLINIC | Facility: CLINIC | Age: 29
End: 2024-08-04
Payer: MEDICAID

## 2024-08-04 VITALS
SYSTOLIC BLOOD PRESSURE: 117 MMHG | BODY MASS INDEX: 29.02 KG/M2 | RESPIRATION RATE: 18 BRPM | HEART RATE: 83 BPM | OXYGEN SATURATION: 97 % | WEIGHT: 170 LBS | TEMPERATURE: 98 F | HEIGHT: 64 IN | DIASTOLIC BLOOD PRESSURE: 77 MMHG

## 2024-08-04 DIAGNOSIS — J02.9 SORE THROAT: ICD-10-CM

## 2024-08-04 DIAGNOSIS — J02.0 STREP PHARYNGITIS: Primary | ICD-10-CM

## 2024-08-04 LAB
CONTROL LINE PRESENT WITH A CLEAR BACKGROUND (YES/NO): YES YES/NO
KIT LOT #: NORMAL NUMERIC
OPERATOR ID: NORMAL
RAPID SARS-COV-2 BY PCR: NOT DETECTED

## 2024-08-04 RX ORDER — AMOXICILLIN 500 MG/1
500 CAPSULE ORAL 2 TIMES DAILY
Qty: 20 CAPSULE | Refills: 0 | Status: SHIPPED | OUTPATIENT
Start: 2024-08-04 | End: 2024-08-14

## 2024-08-04 NOTE — PROGRESS NOTES
CHIEF COMPLAINT:     Chief Complaint   Patient presents with    Sore Throat     X 2 days, cough         HPI:   Lory Son is a 28 year old female who presents for sore throat and cough. Patient reports symptoms present x 2 days.  Denies any fevers,wheezing, chest discomfort, or shortness of breath.   Tolerates PO well at home. No n/v/d.  Denies any other aggravating or relieving factors at home. Denies any treatment attempts prior to arrival.   Denies known covid-19 or strep exposure.     Of note, pt was seen by podiatry on 7/30 for a paronychia. She was given mupirocin and doxycycline. She notes she never started the doxycycline and has only been using the mupirocin. She notes toe has been improving. Denies any pain, erythema or active drainage.    Current Outpatient Medications   Medication Sig Dispense Refill    amoxicillin 500 MG Oral Cap Take 1 capsule (500 mg total) by mouth 2 (two) times daily for 10 days. 20 capsule 0    mupirocin 2 % External Ointment Apply topically to affected sites daily 15 g 0    doxycycline 100 MG Oral Cap Take 1 capsule (100 mg total) by mouth 2 (two) times daily. (Patient not taking: Reported on 8/4/2024) 30 capsule 0    PHENTERMINE HCL 37.5 MG Oral Tab TAKE 1 TABLET(37.5 MG) BY MOUTH EVERY MORNING BEFORE BREAKFAST. START ONE-HALF AND INCREASE TO 1 TABLET IF TOLERATED 30 tablet 0    hydrOXYzine 25 MG Oral Tab  (Patient not taking: Reported on 4/12/2024)      SUMAtriptan (IMITREX) 25 MG Oral Tab Take 1 tablet (25 mg total) by mouth every 2 (two) hours as needed for Migraine. Use at onset; repeat once after 2 HRS-ONLY 2 IN 24 HR MAX 9 tablet 1      Past Medical History:    Abdominal pain    Bad breath    Belching    Bleeding nose    Bloating    Blood in the stool    COVID-19    Decorative tattoo    Depression    Diarrhea, unspecified    Enlarged lymph node    Fatigue    Flatulence/gas pain/belching    Food intolerance    Frequent urination    Frequent UTI    Headache disorder     History of depression    History of mental disorder    Hyperhidrosis    Pt pt \"recently diagnosed\"    Indigestion    Irregular bowel habits    Menses painful    Nausea    Night sweats    Pain with bowel movements    Stress    Vomiting    Weight gain      History reviewed. No pertinent surgical history.      Social History     Socioeconomic History    Marital status: Single   Tobacco Use    Smoking status: Former     Current packs/day: 0.00     Types: Cigarettes    Smokeless tobacco: Never    Tobacco comments:     vaping.    Vaping Use    Vaping status: Former   Substance and Sexual Activity    Alcohol use: Not Currently     Alcohol/week: 14.0 standard drinks of alcohol     Types: 14 Standard drinks or equivalent per week     Comment: occ    Drug use: Not Currently    Sexual activity: Yes   Other Topics Concern    Caffeine Concern Yes     Comment: 1 cup daily     Exercise Yes     Comment: walking    Seat Belt Yes    Special Diet No    Stress Concern No    Weight Concern Yes     Social Determinants of Health     Financial Resource Strain: Not on File (4/10/2024)    Received from [facility]    Financial Resource Strain     Financial Resource Strain: 0   Food Insecurity: Not on File (4/10/2024)    Received from [facility]    Food Insecurity     Food: 0   Transportation Needs: Not on File (4/10/2024)    Received from [facility]    Transportation Needs     Transportation: 0   Physical Activity: Not on File (4/10/2024)    Received from [facility]    Physical Activity     Physical Activity: 0   Stress: Not on File (4/10/2024)    Received from [facility]    Stress     Stress: 0   Social Connections: Not on File (4/10/2024)    Received from [facility]    Social Connections     Social Connections and Isolation: 0   Housing Stability: Not on File (4/10/2024)    Received from [facility]    Housing Stability     Housin         REVIEW OF SYSTEMS:   GENERAL: Denies fever. Notes good appetite  SKIN: no rashes or abnormal skin lesions  HEENT: + sore throat, Denies  sinus symptoms, or ear pain  LUNGS: + nonproductive cough, denies shortness of breath or wheezing,   CARDIOVASCULAR: denies chest pain or palpitations   GI: denies N/V/C or abdominal pain  NEURO: Denies headaches    EXAM:   /77   Pulse 83   Temp 98 °F (36.7 °C)   Resp 18   Ht 5' 4\" (1.626 m)   Wt 170 lb (77.1 kg)   LMP 08/02/2024 (Exact Date)   SpO2 97%   Breastfeeding No   BMI 29.18 kg/m²   GENERAL: well developed, well nourished,in no apparent distress  SKIN: no rashes,no suspicious lesions  HEAD: atraumatic, normocephalic.    EYES: conjunctiva clear  EARS: TM's intact and without erythema, no bulging, no retraction,no fluid, bony landmarks visualized. No erythema or swelling noted to ear canals or external ears.   NOSE: Nostrils patent, clear nasal discharge, nasal mucosa reddened   THROAT: Oral mucosa pink, moist. Posterior pharynx is erythematous. No exudates. No tonsillar hypertrophy noted.  No trismus. Uvula midline with no swelling. Voice clear/normal. No stridor  NECK: Supple, non-tender  LUNGS: clear to auscultation bilaterally, no rales, wheezes or rhonchi. Breathing is non labored.  CARDIO: RRR without murmur  EXTREMITIES:Exam of left great toe. -->   - skin: No erythema  or swelling noted to toe or around eponychium.No active drainage or fluctuance noted.  - deformity/defect: none  - crepitus: none  - ecchymosis/bruising: none  - tenderness over phalanges: none  - Range of motion: full and without pain  - Tendons intact: intact  - radial pulses: 2+ and equal bilat  - Cap refill: <2seconds  - sensation: intact  - Ambulatory with steady gait.  LYMPH:  + mild ant cer lymphadenopathy.        ASSESSMENT AND PLAN:       ICD-10-CM    1. Strep pharyngitis  J02.0 amoxicillin 500 MG Oral Cap      2. Sore throat  J02.9 Strep A Assay W/Optic     COVID-19 LAB TEST NON-CDC          Covid-19 test negative  Rapid strep positive    Discussed physical exam and hpi with pt.  Pt has reassuring physical exam  consistent with pharyngitis. Rapid strep positive.  No signs of pta or retropharyngeal infection.Lungs clear bilat. No respiratory distress noted.Treatment options discussed with patient and explained in detail.  We had a discussion regarding using the doxycycline that was prescribed by her podiatrist that she never started/used vs starting amoxicillin or cephalexin today. Discussed doxycycline can be used to treat strep but it is not our first or second line treatment. Pt prefers to start amoxicillin today. We reviewed symptomatic care at home and will start amoxicillin for strep pharyngitis. She notes she will reach out to podiatry to see if they still prefer she use the doxycycline after the improvement with mupirocin. The risks, benefits and potential side effects of possible medications were reviewed. Alternatives were discussed. Monitoring parameters and expected course outlined.  Patient to call PCP or go to emergency department if symptoms fail to respond as outlined, or worsen in any way. The patient agreed with the plan.     Patient Instructions   1. Rest. Drink plenty of fluids.  2. Supportive care as discussed. Amoxicillin as prescribed.  3. Salt water gargles three times daily  4. Use humidifier at home when possible.  5. The rapid strep test is positive.  6. Covid-19 test is negative.     7. Follow up with PMD in 4-5 days for re-eval. Go to the emergency department immediately if symptoms worsen, change, you develop chest discomfort, wheezing, shortness of breath, or if you have any concerns.

## 2024-08-04 NOTE — PATIENT INSTRUCTIONS
1. Rest. Drink plenty of fluids.  2. Supportive care as discussed. Amoxicillin as prescribed.  3. Salt water gargles three times daily  4. Use humidifier at home when possible.  5. The rapid strep test is positive.  6. Covid-19 test is negative.     7. Follow up with PMD in 4-5 days for re-eval. Go to the emergency department immediately if symptoms worsen, change, you develop chest discomfort, wheezing, shortness of breath, or if you have any concerns.

## 2024-08-22 ENCOUNTER — OFFICE VISIT (OUTPATIENT)
Dept: PODIATRY CLINIC | Facility: CLINIC | Age: 29
End: 2024-08-22

## 2024-08-22 DIAGNOSIS — L03.032 PARONYCHIA OF TOE OF LEFT FOOT: Primary | ICD-10-CM

## 2024-08-25 ENCOUNTER — TELEPHONE (OUTPATIENT)
Dept: PODIATRY CLINIC | Facility: CLINIC | Age: 29
End: 2024-08-25

## 2024-08-25 DIAGNOSIS — M79.675 PAIN OF TOE OF LEFT FOOT: ICD-10-CM

## 2024-08-25 DIAGNOSIS — L03.032 PARONYCHIA OF TOE OF LEFT FOOT: Primary | ICD-10-CM

## 2024-08-25 NOTE — PROGRESS NOTES
Lory Son is a 28 year old female.   Chief Complaint   Patient presents with    Toe Pain     Bilateral hallux f/u- rates pain 6-8/10 most of the time- has swelling         HPI:   Returns to clinic still having pain on both the medial and lateral nail borders of the previously operated hallux those areas are hard somewhat indurated.  At today's visit reviewed nurse's history as taken above, allergies medications and medical history as documented below.  All changes duly noted  Allergies: Nsaids   Current Outpatient Medications   Medication Sig Dispense Refill    doxycycline 100 MG Oral Cap Take 1 capsule (100 mg total) by mouth 2 (two) times daily. (Patient not taking: Reported on 8/4/2024) 30 capsule 0    PHENTERMINE HCL 37.5 MG Oral Tab TAKE 1 TABLET(37.5 MG) BY MOUTH EVERY MORNING BEFORE BREAKFAST. START ONE-HALF AND INCREASE TO 1 TABLET IF TOLERATED 30 tablet 0    mupirocin 2 % External Ointment Apply topically to affected sites daily 15 g 0    hydrOXYzine 25 MG Oral Tab  (Patient not taking: Reported on 4/12/2024)      SUMAtriptan (IMITREX) 25 MG Oral Tab Take 1 tablet (25 mg total) by mouth every 2 (two) hours as needed for Migraine. Use at onset; repeat once after 2 HRS-ONLY 2 IN 24 HR MAX 9 tablet 1      Past Medical History:    Abdominal pain    Bad breath    Belching    Bleeding nose    Bloating    Blood in the stool    COVID-19    Decorative tattoo    Depression    Diarrhea, unspecified    Enlarged lymph node    Fatigue    Flatulence/gas pain/belching    Food intolerance    Frequent urination    Frequent UTI    Headache disorder    History of depression    History of mental disorder    Hyperhidrosis    Pt pt \"recently diagnosed\"    Indigestion    Irregular bowel habits    Menses painful    Nausea    Night sweats    Pain with bowel movements    Stress    Vomiting    Weight gain      History reviewed. No pertinent surgical history.   Family History   Problem Relation Age of Onset    Anxiety Father      Substance Abuse Father     Bipolar Disorder Father     Other (Other) Father         substance abuse disorder    Diabetes Mother     Cancer Mother     Anxiety Mother     Other (Other) Mother         substance abuse disorder    Diabetes Sister     Anxiety Sister     Suicide History Sister     Anxiety Brother     OCD Brother     Suicide History Brother       Social History     Socioeconomic History    Marital status: Single   Tobacco Use    Smoking status: Former     Current packs/day: 0.00     Types: Cigarettes    Smokeless tobacco: Never    Tobacco comments:     vaping.    Vaping Use    Vaping status: Former   Substance and Sexual Activity    Alcohol use: Not Currently     Alcohol/week: 14.0 standard drinks of alcohol     Types: 14 Standard drinks or equivalent per week     Comment: occ    Drug use: Not Currently    Sexual activity: Yes   Other Topics Concern    Caffeine Concern Yes     Comment: 1 cup daily     Exercise Yes     Comment: walking    Seat Belt Yes    Special Diet No    Stress Concern No    Weight Concern Yes           REVIEW OF SYSTEMS:   Today reviewed systens as documented below  GENERAL HEALTH: feels well otherwise  SKIN: Refer to exam below  RESPIRATORY: denies shortness of breath with exertion  CARDIOVASCULAR: denies chest pain on exertion  GI: denies abdominal pain and denies heartburn  NEURO: denies headaches    EXAM:   LMP 08/02/2024 (Exact Date)   GENERAL: well developed, well nourished, in no apparent distress  EXTREMITIES:   1. Integument: The skin on her left foot was evaluated the hallux nail may have recurrences that are growing in underneath the skin at this time.   2. Vascular: Patient has palpable pulses   3. Neurologic: Has intact sensorium   4. Musculoskeletal: Patient has good muscle strength.    ASSESSMENT AND PLAN:   Diagnoses and all orders for this visit:    Paronychia of toe of left foot        Plan: At this point in time I evaluated the patient and I think the best thing to do  is to revise the [place] at onychoplasty's and see if there is any recurrent nail under it.At today's visit both conservative and surgical management was discussed for the diagnoses listed above.  After a lengthy conversation the patient opted for surgery after questions were answered.  The nature and extent of the surgery which would be, revision of onychoplasty's medial and lateral nail borders of the left hallux was explained in great detail.  The pre-, acacia-and postoperative management was discussed, along with changes in bathing habits as well.  The necessity for restricted activity possible nonweightbearing was also reviewed.  The possible complications and risks associated with the surgery including but not limited to recurrence of the deformity, nonresolution of the problem, infection as well as hospitalization and intravenous antibiotics if that occurs, the necessity for further surgery and/or hospitalization should complications occur or if an undesired result was obtained, and permanent numbness around the surgical site, guarantees or assurances were not offered.  Questions were invited and answered to the best of my ability.  At this time the patient wished to proceed with the surgical procedure and we will try to get that scheduled to the patient's convenience.     The patient indicates understanding of these issues and agrees to the plan.    Arnaud Urrutia DPM

## 2024-08-27 ENCOUNTER — TELEPHONE (OUTPATIENT)
Dept: PODIATRY CLINIC | Facility: CLINIC | Age: 29
End: 2024-08-27

## 2024-08-27 ENCOUNTER — TELEPHONE (OUTPATIENT)
Dept: FAMILY MEDICINE CLINIC | Facility: CLINIC | Age: 29
End: 2024-08-27

## 2024-08-27 DIAGNOSIS — L03.032 PARONYCHIA OF TOE OF LEFT FOOT: Primary | ICD-10-CM

## 2024-08-27 DIAGNOSIS — M79.675 PAIN OF TOE OF LEFT FOOT: ICD-10-CM

## 2024-08-27 NOTE — TELEPHONE ENCOUNTER
S/W patient and scheduled surgery for 10/30 @ [facility]. Patient had surgery back in January and is aware of information on pre op letter and post op letter. Both letters were sent to the patient's [employer] account. Patient was informed that the only difference this time is that pre surgical clearance will be needed prior to surgery due to surgery occurring at the hospital setting and not the surgery center. Patient has expressed verbal understanding and is aware that I will be in contact patient's pcp to provide PAT requirements. Managed care orders have been placed, surgery is on the calendar and post op appointments have been made.

## 2024-08-27 NOTE — TELEPHONE ENCOUNTER
Patient returning call to schedule surgery. Patient states they tried to call the [phone] number but could not get through. Patient states she cannot do surgery in September and would prefer October 7th or that week. Please call.

## 2024-08-27 NOTE — TELEPHONE ENCOUNTER
Dr. Ramirez,     The patient is scheduled for outpatient foot surgery with Dr. Urrutia on 10/30 for the following procedure(s): Revision of Win[place]rad onychoplsty's medial and lateral nail borders of the left hallux.      The patient has been advised to contact your office for pre-operative clearance.  The patient needs the following test/exams    __X__ History and Physical (H&P) may be no older than THIRTY (30) days prior to surgery. If possible, please complete within 7 days prior to surgery. If done before 7 days of surgery date, an update to the office visit note is required within 7 days prior to surgery.     ____ EKG for patients that are age 50 or older, have hypertension, diabetes or a history of cardiac disease or are obese.  This should be no older than 6 months prior to surgery.    _X___ Complete Metabolic Panel (CMP) & CBC. This should be no older than 3 months prior to surgery.    Please include any other test you deem necessary for medical clearance.    Thank you,   Sarah   Surgical Scheduler   [phone]

## 2024-08-27 NOTE — TELEPHONE ENCOUNTER
[facility] [phone] to schedule surgery. Informed patient that I am looking into scheduling surgery for 9/11 @ [facility] asking patient to confirm date.

## 2024-09-16 DIAGNOSIS — E66.9 CLASS 1 OBESITY WITH BODY MASS INDEX (BMI) OF 31.0 TO 31.9 IN ADULT, UNSPECIFIED OBESITY TYPE, UNSPECIFIED WHETHER SERIOUS COMORBIDITY PRESENT: ICD-10-CM

## 2024-09-16 RX ORDER — PHENTERMINE HYDROCHLORIDE 37.5 MG/1
TABLET ORAL
Qty: 30 TABLET | Refills: 0 | Status: SHIPPED | OUTPATIENT
Start: 2024-09-16

## 2024-09-16 NOTE — TELEPHONE ENCOUNTER
Controlled Substance Medication [id number]/2024 03:06 PM    This medication is a controlled substance - forward to provider to refill       LOV 4/12/24     Last Refill   Medication Quantity Refills Start End   PHENTERMINE HCL 37.5 MG Oral Tab 30 tablet 0 7/30/2024        Future Appointments   Date Time Provider Department Center   10/1/2024 12:45 PM Ely Ramirez, DO [facility]   11/6/2024  1:15 PM Arnaud Urrutia DPM [id number]   11/13/2024  1:15 PM Arnaud Urrutia DPM [id number]

## 2024-09-23 ENCOUNTER — HOSPITAL ENCOUNTER (OUTPATIENT)
Age: 29
Discharge: HOME | End: 2024-09-23
Payer: MEDICAID

## 2024-09-23 VITALS
RESPIRATION RATE: 18 BRPM | OXYGEN SATURATION: 97 % | TEMPERATURE: 97 F | HEART RATE: 85 BPM | DIASTOLIC BLOOD PRESSURE: 88 MMHG | SYSTOLIC BLOOD PRESSURE: 128 MMHG | WEIGHT: 168 LBS | HEIGHT: 65 IN | BODY MASS INDEX: 27.99 KG/M2

## 2024-09-23 DIAGNOSIS — N93.8 DYSFUNCTIONAL UTERINE BLEEDING: ICD-10-CM

## 2024-09-23 DIAGNOSIS — R10.2 PELVIC PAIN: Primary | ICD-10-CM

## 2024-09-23 LAB
#MXD IC: 0.6 X10ˆ3/UL (ref 0.1–1)
B-HCG UR QL: NEGATIVE
BILIRUB UR QL STRIP: NEGATIVE
BUN BLD-MCNC: 12 MG/DL (ref 7–18)
CHLORIDE BLD-SCNC: 103 MMOL/L (ref 98–112)
CO2 BLD-SCNC: 22 MMOL/L (ref 21–32)
COLOR UR: YELLOW
CREAT BLD-MCNC: 1 MG/DL
EGFRCR SERPLBLD CKD-EPI 2021: 79 ML/MIN/1.73M2 (ref 60–?)
GLUCOSE BLD-MCNC: 111 MG/DL (ref 70–99)
GLUCOSE UR STRIP-MCNC: NEGATIVE MG/DL
HCT VFR BLD AUTO: 42.4 %
HCT VFR BLD CALC: 41 %
HGB BLD-MCNC: 13.9 G/DL
ISTAT IONIZED CALCIUM FOR CHEM 8: 1.15 MMOL/L (ref 1.12–1.32)
KETONES UR STRIP-MCNC: NEGATIVE MG/DL
LEUKOCYTE ESTERASE UR QL STRIP: NEGATIVE
LYMPHOCYTES # BLD AUTO: 1.6 X10ˆ3/UL (ref 1–4)
LYMPHOCYTES NFR BLD AUTO: 23 %
MCH RBC QN AUTO: 29.9 PG (ref 26–34)
MCHC RBC AUTO-ENTMCNC: 32.8 G/DL (ref 31–37)
MCV RBC AUTO: 91.2 FL (ref 80–100)
MIXED CELL %: 8.8 %
NEUTROPHILS # BLD AUTO: 4.7 X10ˆ3/UL (ref 1.5–7.7)
NEUTROPHILS NFR BLD AUTO: 68.2 %
NITRITE UR QL STRIP: NEGATIVE
PH UR STRIP: 6.5 [PH]
PLATELET # BLD AUTO: 288 X10ˆ3/UL (ref 150–450)
POTASSIUM BLD-SCNC: 3.8 MMOL/L (ref 3.6–5.1)
PROT UR STRIP-MCNC: NEGATIVE MG/DL
RBC # BLD AUTO: 4.65 X10ˆ6/UL
SODIUM BLD-SCNC: 137 MMOL/L (ref 136–145)
SP GR UR STRIP: 1.02
UROBILINOGEN UR STRIP-ACNC: <2 MG/DL
WBC # BLD AUTO: 6.9 X10ˆ3/UL (ref 4–11)

## 2024-09-23 PROCEDURE — 99214 OFFICE O/P EST MOD 30 MIN: CPT | Performed by: NURSE PRACTITIONER

## 2024-09-23 PROCEDURE — 81002 URINALYSIS NONAUTO W/O SCOPE: CPT | Performed by: NURSE PRACTITIONER

## 2024-09-23 PROCEDURE — 80047 BASIC METABLC PNL IONIZED CA: CPT | Performed by: NURSE PRACTITIONER

## 2024-09-23 PROCEDURE — 81025 URINE PREGNANCY TEST: CPT | Performed by: NURSE PRACTITIONER

## 2024-09-23 PROCEDURE — 85025 COMPLETE CBC W/AUTO DIFF WBC: CPT | Performed by: NURSE PRACTITIONER

## 2024-09-23 NOTE — ED PROVIDER NOTES
Patient Seen in: [facility]    History     Chief Complaint   Patient presents with    Bleeding    Cramping     Stated Complaint: has had menstrual period for past 23 days and cramping    HPI    Pt is a 29yr old   with with c/o menstrual bleeding that started about 23 days ago.  Reports that she had a positive preg test on -.  She reports a few days after she started bleeing lightly which stopped on the .  Reports that today the bleeding started again, heavier than it was.  Reports that there Patient complains of suprapubic cramping as well. Stated that she did have some pain with intercourse.   Patient denies being dizzy, light headed or short of breath.  No fever, or chills, no urinary frequency or urgency.  No nausea or vomiting.         Past Medical History:    Abdominal pain    Bad breath    Belching    Bleeding nose    Bloating    Blood in the stool    COVID-19    Decorative tattoo    Depression    Diarrhea, unspecified    Enlarged lymph node    Fatigue    Flatulence/gas pain/belching    Food intolerance    Frequent urination    Frequent UTI    Headache disorder    History of depression    History of mental disorder    Hyperhidrosis    Pt pt \"recently diagnosed\"    Indigestion    Irregular bowel habits    Menses painful    Nausea    Night sweats    Pain with bowel movements    Stress    Vomiting    Weight gain       History reviewed. No pertinent surgical history.         Family History   Problem Relation Age of Onset    Anxiety Father     Substance Abuse Father     Bipolar Disorder Father     Other (Other) Father         substance abuse disorder    Diabetes Mother     Cancer Mother     Anxiety Mother     Other (Other) Mother         substance abuse disorder    Diabetes Sister     Anxiety Sister     Suicide History Sister     Anxiety Brother     OCD Brother     Suicide History Brother        Social History     Socioeconomic History    Marital status: Single   Tobacco Use     Smoking status: Former     Current packs/day: 0.00     Types: Cigarettes    Smokeless tobacco: Never    Tobacco comments:     vaping.    Vaping Use    Vaping status: Former   Substance and Sexual Activity    Alcohol use: Not Currently     Alcohol/week: 14.0 standard drinks of alcohol     Types: 14 Standard drinks or equivalent per week     Comment: occ    Drug use: Not Currently    Sexual activity: Yes   Other Topics Concern    Caffeine Concern Yes     Comment: 1 cup daily     Exercise Yes     Comment: walking    Seat Belt Yes    Special Diet No    Stress Concern No    Weight Concern Yes     Social Determinants of Health     Financial Resource Strain: Not on File (4/10/2024)    Received from [facility]    Financial Resource Strain     Financial Resource Strain: 0   Food Insecurity: No Food Insecurity (2024)    Received from [facility]    Food Insecurity     Currently or in the past 3 months, have you worried your food would run out before you had money to buy more?: No     In the past 12 months, have you run out of food or been unable to get more?: No   Transportation Needs: No Transportation Needs (2024)    Received from [facility]    Transportation Needs     Medical Transportation Needs?: No   Physical Activity: Not on File (4/10/2024)    Received from [facility]    Physical Activity     Physical Activity: 0   Stress: Not on File (4/10/2024)    Received from [facility]    Stress     Stress: 0   Social Connections: Not on File (2024)    Received from [facility]    Social Connections     Connectedness: 0   Housing Stability: Not on File (4/10/2024)    Received from [facility]    Housing Stability     Housin       Review of Systems    Positive for stated complaint: has had menstrual period for past 23 days and cramping  Other systems are as noted in HPI.  Constitutional and vital signs reviewed.      All other systems reviewed and negative except as noted above.    PSFH elements  reviewed from today and agreed except as otherwise stated in HPI.    Physical Exam     ED Triage Vitals [09/23/24 1746]   BP (!) 135/100   Pulse 85   Resp 18   Temp 97.2 °F (36.2 °C)   Temp src Temporal   SpO2 97 %   O2 Device None (Room air)       Current:/88   Pulse 85   Temp 97.2 °F (36.2 °C) (Temporal)   Resp 18   Ht 165.1 cm (5' 5\")   Wt 76.2 kg   LMP 09/23/2024 (Exact Date)   SpO2 97%   BMI 27.96 kg/m²     Female  physical exam:     VS: Vital signs reviewed. O2 saturation within normal limits for this patient     General: Patient is awake and alert, oriented to person, place and time. Not in acute distress.      HEENT: Head is normocephalic atraumatic. Posterior oropharynx does not reveal any erythema, edema or exudates.  No oral vesicles. Mucous membranes moist.        Lungs: good inspiratory effort. +air entry bilaterally without wheezes, rhonchi, crackles.  No accessory muscle use or tachypnea.       Abdomen: Soft, nontender, nondistended.  Active bowel sounds present.       Back: No CVA tenderness.     : Chaperone  Kayla MCCARTHY present. External genital exam does not reveal any rashes or lesions.  Speculum exam performed there is a large foreign body/tissue in the vaginal vault.  Very minimal bleeding at this time.   Cervix is visualized without apparent abnormality.   Bimanual exam does not reveal cervical motion tenderness.  No palpable masses or adnexal tenderness.     Extremities: No edema.  Pulses 2+ extremities.       Skin: No rash noted.  No ecchymosis.       CNS: Moves all 4 extremities.  Interacts appropriately.             ED Course     Labs Reviewed   [facility] POCT URINALYSIS DIPSTICK - Abnormal; Notable for the following components:       Result Value    Urine Clarity Cloudy (*)     Blood, Urine Moderate (*)     All other components within normal limits   POCT ISTAT CHEM8 CARTRIDGE - Abnormal; Notable for the following components:    ISTAT Glucose 111 (*)     All other components  within normal limits   POCT PREGNANCY URINE - Normal   VAGINITIS VAGINOSIS PCR PANEL - Normal    Narrative:     This assay utilizes RT-PCR to detect the DNA of Gardnerella vaginalis, Lactobacillus spp. (L. crispatus and L. jensenii), Atopobium vaginae, Bacterial Vaginosis Associated Bacteria-2 (BVAB-2), and Megasphaera-1. An algorithm is used to determine if the targets detected represent a vaginal microbiome consistent with bacterial vaginosis or normal vaginal christiane.  FDA approval was based on data in the 18 years and over population.       POCT CBC   SURGICAL PATHOLOGY TISSUE   CHLAMYDIA/GONOCOCCUS, ADRIENNE           I have personally  reviewed available prior medical records for any recent pertinent discharge summaries/testing. Patient/family updated on results and plan, a verbalized understanding and agreement with the plan.  I explained to the patient that emergent conditions may arise and to go to the ER for new, worsening or any persistent conditions. I've explained the importance of taking all medicatons as prescribed, follow up, and return precuations,  All questions answered.  MDM         Patient is a 29-year-old female who is here today with complaints of menstrual bleeding since a positive pregnancy test on 8/28.  Reports that she had been spotting/very light flow until the last couple of days which the bleeding became more heavy.She reports that she did have a positive pregnancy test.  Here her pregnancy test is negative today.  She does have blood in her urine however no signs of an infection.   On vaginal exam the patient has a foreign body/tissue in her vaginal vault.  The tissue was removed, an order was sent for the products of conception.  H&H were within normal limits and stable at this time.  There was no significant bleeding on exam.  There was no cervical motion tenderness.  GC chlamydia and vaginitis sent and pending.  Patient was discharged home to follow-up with her primary care physician,  information on return and follow-up precautions were given.  Patient was also given strict bleeding precautions.  She was discharged home in stable condition to follow-up and to return to the emergency department with any worsening symptoms or concern    Disposition and Plan     Clinical Impression:  1. Pelvic pain    2. Dysfunctional uterine bleeding        Disposition:  Discharge    Follow-up:  Ely Ramirez, DO  [address] [facility] [address]  [phone]          Felisha Tamayo, MARQUES  [address] [address] [phone]            Medications Prescribed:  Discharge Medication List as of 9/23/2024  6:22 PM

## 2024-09-23 NOTE — ED INITIAL ASSESSMENT (HPI)
May Pt reports ER told her she hd an ectopic pregnancy and another ER told her it was a pregnancy that didn't implant.  9/1 Pt started with her Menses with cramping

## 2024-09-24 ENCOUNTER — TELEPHONE (OUTPATIENT)
Dept: OBGYN CLINIC | Facility: CLINIC | Age: 29
End: 2024-09-24

## 2024-09-24 LAB
BV BACTERIA DNA VAG QL NAA+PROBE: NEGATIVE
C GLABRATA DNA VAG QL NAA+PROBE: NEGATIVE
C KRUSEI DNA VAG QL NAA+PROBE: NEGATIVE
C TRACH DNA SPEC QL NAA+PROBE: NEGATIVE
CANDIDA DNA VAG QL NAA+PROBE: NEGATIVE
N GONORRHOEA DNA SPEC QL NAA+PROBE: NEGATIVE
T VAGINALIS DNA VAG QL NAA+PROBE: NEGATIVE

## 2024-09-24 NOTE — TELEPHONE ENCOUNTER
Called to follow up with patient.     Patient is established with our office.     Was seen in urgent care 09/23 for vaginal bleeding and severe cramping pain. Passing many blood clots, had been bleeding for 3 consecutive weeks, stopped over weekend, and then returned with this cramping pain.     Negative pregnancy testing in urgent care. Removed tissue in urgent care and sent for pathology, they were unsure of what it was. Working theory was possibly a tampon, but patient stated they believe it was unlikely and sent it as tissue sample. No imaging done in urgent care.     Today still having cramping pain rated a 8/10 and intermittent.   Bleeding is minimal.     Scheduled for follow up visit with Dr. Shannon tomorrow 09/25 however, also advised patient to return to ER for pain management and possible imaging.  Patient agreeable, will be seen in ER now and follow up in office tomorrow pending assessment.     All questions answered.

## 2024-09-26 ENCOUNTER — ULTRASOUND ENCOUNTER (OUTPATIENT)
Dept: OBGYN CLINIC | Facility: CLINIC | Age: 29
End: 2024-09-26
Payer: MEDICAID

## 2024-09-26 ENCOUNTER — OFFICE VISIT (OUTPATIENT)
Dept: OBGYN CLINIC | Facility: CLINIC | Age: 29
End: 2024-09-26
Payer: MEDICAID

## 2024-09-26 VITALS
DIASTOLIC BLOOD PRESSURE: 72 MMHG | HEIGHT: 64 IN | HEART RATE: 82 BPM | WEIGHT: 162.5 LBS | BODY MASS INDEX: 27.74 KG/M2 | SYSTOLIC BLOOD PRESSURE: 114 MMHG

## 2024-09-26 DIAGNOSIS — N93.9 ABNORMAL UTERINE BLEEDING (AUB): Primary | ICD-10-CM

## 2024-09-26 PROCEDURE — 99213 OFFICE O/P EST LOW 20 MIN: CPT | Performed by: NURSE PRACTITIONER

## 2024-09-26 NOTE — PROGRESS NOTES
Gyne note       S: patient is a 28 year old yo  here for a follow up after being seen in the urgent care and ER for prolonged and heavy vaginal bleeding and pelvic pain.    She notes she had positive pregnancy tests -2024. Her LMP prior was 8/1/2024 x 1 day only. 7/3/24 was her first menses after prior miscarriage in . She did not have a menses in , and she did have negative pregnancy tests following the miscarriage in .    On 9/3/2024 she started bleeding very light- it increased to a  regular flow for about 4 days then got very heavy with severe and clots. The bleeding stopped bleed -2024.     On 24 her bleeding returned and was very heavy so she went to the urgent care. Wile there she passed a large amount of clot and they removed some tissue which they sent for pathology.    Seen in ER the next day at [facility] and they did a pelvic ultrasound when was as follows:    The uterus is anteverted measuring 8 x 2 x 5 cm with a double endometrial thickness of 7 mm, with 1.3 x 0.3 x 0.8 cm anechoic focus within the endometrial cavity which may represent a gestational sac with no yolk sac or fetal pole versus endometrial cavity fluid.     The right ovary measures 4 x 2 x 3 cm and the left ovary measures 3 x 1 x 2 cm and demonstrates multiple follicles without adnexal mass.  Normal arterial and venous waveforms of both ovaries are demonstrated.     Her HCG was <1, normal Hemoglobin and hematocrit    Review of Systems:  General: denies fevers, chills, fatigue and malaise.     O:/72   Pulse 82   Ht 64\"   Wt 162 lb 8 oz (73.7 kg)   LMP 2024 (Exact Date)   BMI 27.89 kg/m²   Gen NAD     GYNE/: External Genitalia: Normal appearing, no lesions. Urethral meatus appear wnl, no abnormal discharge or lesions noted.                                Vagina: normal pink mucosa, no lesions, small to moderate dark red blood.                      Uterus: AV, mobile,  non tender, normal size                     Cervix: nulliparous, no lesions                     Adnexa: no masses, normal size        Diffuse tenderness throughout bimanual exam    A/P:  1. Abnormal uterine bleeding (AUB)  Advised we can consider Cytotec/ D&C as last resort if her bleeding increases or persists after discussing with MD in office after reviewing case, prior and current ultrasound from today as well as photo pf tissue passes which is in her mychart messages    Will await her pathology from the specimen collected in urgent care    - Pelvic US Complete GYNE Only [[id number]]; Future

## 2024-09-28 ENCOUNTER — PATIENT MESSAGE (OUTPATIENT)
Dept: PODIATRY CLINIC | Facility: CLINIC | Age: 29
End: 2024-09-28

## 2024-09-30 NOTE — TELEPHONE ENCOUNTER
Patient is scheduled for revision of Winograds onychoplasty medial and lateral nail borders of the left hallux on 10/30/24- Please see patient message and advise on right hallux

## 2024-09-30 NOTE — TELEPHONE ENCOUNTER
From: Lory Son  To: Arnaud Urrutia  Sent: 9/28/2024 4:30 PM CDT  Subject: Toe    I have surgery scheduled for October 30th on my left big toe. My right big toe is now also swollen, red and very painful. Are we able to do both toes again?

## 2024-09-30 NOTE — TELEPHONE ENCOUNTER
Please tell the patient I would like to have her come in just for preop evaluation so I can see the right great toe open yes we could probably schedule it.

## 2024-10-01 ENCOUNTER — TELEPHONE (OUTPATIENT)
Dept: OBGYN CLINIC | Facility: CLINIC | Age: 29
End: 2024-10-01

## 2024-10-01 ENCOUNTER — OFFICE VISIT (OUTPATIENT)
Dept: FAMILY MEDICINE CLINIC | Facility: CLINIC | Age: 29
End: 2024-10-01
Payer: MEDICAID

## 2024-10-01 VITALS
WEIGHT: 164.19 LBS | DIASTOLIC BLOOD PRESSURE: 70 MMHG | SYSTOLIC BLOOD PRESSURE: 120 MMHG | OXYGEN SATURATION: 99 % | RESPIRATION RATE: 18 BRPM | TEMPERATURE: 98 F | BODY MASS INDEX: 28 KG/M2 | HEART RATE: 76 BPM

## 2024-10-01 DIAGNOSIS — R53.82 CHRONIC FATIGUE: ICD-10-CM

## 2024-10-01 DIAGNOSIS — E66.3 OVERWEIGHT (BMI 25.0-29.9): ICD-10-CM

## 2024-10-01 DIAGNOSIS — R61 GENERALIZED HYPERHIDROSIS: ICD-10-CM

## 2024-10-01 DIAGNOSIS — L60.0 INGROWN TOENAIL: ICD-10-CM

## 2024-10-01 DIAGNOSIS — Z01.818 PRE-OP EXAMINATION: Primary | ICD-10-CM

## 2024-10-01 PROCEDURE — 99243 OFF/OP CNSLTJ NEW/EST LOW 30: CPT | Performed by: FAMILY MEDICINE

## 2024-10-01 NOTE — TELEPHONE ENCOUNTER
Called and spoke with pt. And she said she had just spoken with Steffany about her concerns and she did not have any questions for me so will close encounter.

## 2024-10-01 NOTE — H&P
Lory Son is a 29 year old female who presents for a pre-operative physical exam. Patient is to have Revision of Winograd onychoplasty''s medial and lateral nail borders of the left hallux, possibly also right side, to be done by Dr. Urrutia at [facility] on 10/30/24.      HPI:   Pt complains of nothing new today, but her ingrown toenails are still bothering her. She has had numerous toenail surgeries, most recently in January with Dr Urrutia. Now has pain and swelling again and revision is recommended.     Had a pregnancy in May, miscarriage in June. Didn't go for follow up ultrasound. Then started having pain and bleeding last week. Passed products of conception last week. Saw gyne for follow up. Ultrasound showed there was nothing left. Her bleeding has stopped. She spoke with gynecology today and was told everything had resolved. No more pain. No fevers. She is feeling well.     No h/o complications with anesthesia or family history.   No h/o blood transfusions.   No recent fevers or illness.   No URI symptoms.   No GI symptoms.   No  symptoms. No urinary symptoms.  No redness of pain on on skin other than a patch on left foot she's been scratching.     Still doing well with phentermine for weight. Also helps with energy, hyperhidrosis is better.       Current Outpatient Medications   Medication Sig Dispense Refill    PHENTERMINE HCL 37.5 MG Oral Tab TAKE 1 TABLET(37.5 MG) BY MOUTH EVERY MORNING BEFORE BREAKFAST. START 1-HALF AND INCREASE TO 1 TABLET IF TOLERATED 30 tablet 0    hydrOXYzine 25 MG Oral Tab  (Patient not taking: Reported on 4/12/2024)      SUMAtriptan (IMITREX) 25 MG Oral Tab Take 1 tablet (25 mg total) by mouth every 2 (two) hours as needed for Migraine. Use at onset; repeat once after 2 HRS-ONLY 2 IN 24 HR MAX (Patient not taking: Reported on 9/23/2024) 9 tablet 1      Allergies:   Allergies   Allergen Reactions    Nsaids HIVES and SWELLING     Angioedema upper lip      Past Medical  History:    Abdominal pain    Bad breath    Belching    Bleeding nose    Bloating    Blood in the stool    COVID-19    Decorative tattoo    Depression    Diarrhea, unspecified    Enlarged lymph node    Fatigue    Flatulence/gas pain/belching    Food intolerance    Frequent urination    Frequent UTI    Headache disorder    History of depression    History of mental disorder    Hyperhidrosis    Pt pt \"recently diagnosed\"    Indigestion    Irregular bowel habits    Menses painful    Nausea    Night sweats    Pain with bowel movements    Stress    Vomiting    Weight gain      No past surgical history on file.   Family History   Problem Relation Age of Onset    Anxiety Father     Substance Abuse Father     Bipolar Disorder Father     Other (Other) Father         substance abuse disorder    Diabetes Mother     Cancer Mother     Anxiety Mother     Other (Other) Mother         substance abuse disorder    Diabetes Sister     Anxiety Sister     Suicide History Sister     Anxiety Brother     OCD Brother     Suicide History Brother       Social History:   Social History     Socioeconomic History    Marital status: Single   Tobacco Use    Smoking status: Former     Current packs/day: 0.00     Types: Cigarettes    Smokeless tobacco: Never    Tobacco comments:     vaping.    Vaping Use    Vaping status: Former   Substance and Sexual Activity    Alcohol use: Not Currently     Alcohol/week: 14.0 standard drinks of alcohol     Types: 14 Standard drinks or equivalent per week     Comment: occ    Drug use: Not Currently    Sexual activity: Yes   Other Topics Concern    Caffeine Concern Yes     Comment: 1 cup daily     Exercise Yes     Comment: walking    Seat Belt Yes    Special Diet No    Stress Concern No    Weight Concern Yes     Social Determinants of Health     Financial Resource Strain: Not on File (4/10/2024)    Received from [facility]    Financial Resource Strain     Financial Resource Strain: 0   Food Insecurity: Not on File  (2024)    Received from [email or web address]    Food Insecurity     Food: 0   Transportation Needs: No Transportation Needs (2024)    Received from [facility]    Transportation Needs     Medical Transportation Needs?: No   Physical Activity: Not on File (4/10/2024)    Received from [email or web address]    Physical Activity     Physical Activity: 0   Stress: Not on File (4/10/2024)    Received from [email or web address]    Stress     Stress: 0   Social Connections: Not on File (2024)    Received from [email or web address]    Social Connections     Connectedness: 0   Housing Stability: Not on File (4/10/2024)    Received from [email or web address]    Housing Stability     Housin      Occ: . : no. Children: .   Exercise:  staying active .  Diet:  eating healthy.      REVIEW OF SYSTEMS:   GENERAL: feels well otherwise  SKIN: denies any unusual skin lesions other than excoriated area on the top of the left foot.   EYES:denies blurred vision or double vision  HEENT: denies nasal congestion, sinus pain or ST  LUNGS: denies shortness of breath with exertion  CARDIOVASCULAR: denies chest pain on exertion  GI: denies abdominal pain,denies heartburn  : denies dysuria, vaginal discharge or itching, see HPI denies nocturia or changes in stream  MUSCULOSKELETAL: denies back pain or joint pain other than toes   NEURO: denies headaches or dizziness   PSYCHE: denies depression or anxiety  HEMATOLOGIC: denies hx of anemia  ENDOCRINE: denies thyroid history  ALL/ASTHMA: denies hx of allergy or asthma    EXAM:   /70 (BP Location: Left arm, Patient Position: Sitting, Cuff Size: large)   Pulse 76   Temp 97.7 °F (36.5 °C) (Temporal)   Resp 18   Wt 164 lb 3.2 oz (74.5 kg)   LMP 2024 (Exact Date)   SpO2 99%   BMI 28.18 kg/m²   GENERAL: well developed, well nourished,in no apparent distress  SKIN: no rashes,no suspicious lesions  HEENT: atraumatic, normocephalic,ears and throat are clear  EYES:PERRLA, EOMI, conjunctiva are clear  NECK:  supple,no adenopathy   CHEST: no chest tenderness  BREAST: not done   LUNGS: clear to auscultation  CARDIO: RRR without murmur  GI: good BS's,no masses, HSM or tenderness  : deferred   MUSCULOSKELETAL: back is not tender,FROM of the back  EXTREMITIES: no cyanosis, clubbing or edema  NEURO: Oriented times three,cranial nerves are intact,motor and sensory are grossly intact    ASSESSMENT AND PLAN:   Lory Son is a 29 year old female who presents for a pre-operative physical exam. atient is to have Revision of Winograd onychoplasty''s medial and lateral nail borders of the left hallux, possibly also right side, to be done by Dr. Urrutia at [facility] on 10/30/24.  Pt has the following conditions:     1. Pre-op examination  Cleared for surgery at this time. She has no known cardiac or pulmonary conditions. She had a CMP last week which was normal.     2. Ingrown toenail  Surgery is recommended per Dr. Urrutia.     3. Generalized hyperhidrosis  Stable.     4. Chronic fatigue  Better with phentermine. Hold before surgery x 7-10 days. Can resume after a week.     5. Overweight (BMI 25.0-29.9)  Doing well with phentermine for weight loss. It has helped her feel better in many other ways, so will continue for now. Hold for surgery as above.     6. Abnormal Uterine Bleeding.   - now resolved.   - cleared for surgery.   - she will follow up with gyne for birth control counseling going forward.        Pt has no significant history of cardiac or pulmonary conditions. Pt is a good surgical candidate. This consult was sent back the referring physician, Dr. Urrutia.

## 2024-10-11 ENCOUNTER — ANESTHESIA EVENT (OUTPATIENT)
Dept: SURGERY | Facility: HOSPITAL | Age: 29
End: 2024-10-11
Payer: MEDICAID

## 2024-10-14 ENCOUNTER — OFFICE VISIT (OUTPATIENT)
Dept: PODIATRY CLINIC | Facility: CLINIC | Age: 29
End: 2024-10-14
Payer: MEDICAID

## 2024-10-14 DIAGNOSIS — M79.675 PAIN IN TOES OF BOTH FEET: Primary | ICD-10-CM

## 2024-10-14 DIAGNOSIS — M79.674 PAIN IN TOES OF BOTH FEET: Primary | ICD-10-CM

## 2024-10-14 PROCEDURE — 99213 OFFICE O/P EST LOW 20 MIN: CPT | Performed by: PODIATRIST

## 2024-10-26 NOTE — PROGRESS NOTES
Lory Son is a 29 year old female.   Chief Complaint   Patient presents with    Pre-Op     L hallux scheduled sx on 10/30/2024- want to discuss sx and to check R hallux for possible add on         HPI:   Patient returns to the clinic she is scheduled to have surgery on her left hallux for possible recurrent nail injury she is now getting that same pain on the lateral nail border of her right.  At today's visit reviewed nurse's history as taken above, allergies medications and medical history as documented below.  All changes duly noted  Allergies: Nsaids   Current Outpatient Medications   Medication Sig Dispense Refill    PHENTERMINE HCL 37.5 MG Oral Tab TAKE 1 TABLET(37.5 MG) BY MOUTH EVERY MORNING BEFORE BREAKFAST. START 1-HALF AND INCREASE TO 1 TABLET IF TOLERATED 30 tablet 0    hydrOXYzine 25 MG Oral Tab  (Patient not taking: Reported on 4/12/2024)      SUMAtriptan (IMITREX) 25 MG Oral Tab Take 1 tablet (25 mg total) by mouth every 2 (two) hours as needed for Migraine. Use at onset; repeat once after 2 HRS-ONLY 2 IN 24 HR MAX 9 tablet 1      Past Medical History:    Abdominal pain    Bad breath    Belching    Bleeding nose    Bloating    Blood in the stool    COVID-19    Decorative tattoo    Depression    Diarrhea, unspecified    Enlarged lymph node    Fatigue    Flatulence/gas pain/belching    Food intolerance    Frequent urination    Frequent UTI    Headache disorder    History of depression    History of mental disorder    Hyperhidrosis    Pt pt \"recently diagnosed\"    Indigestion    Irregular bowel habits    Menses painful    Nausea    Night sweats    Pain with bowel movements    PONV (postoperative nausea and vomiting)    has never had anesthesia, but is highly suseptable to Nausea and vomitting    Stress    Vomiting    Weight gain      Past Surgical History:   Procedure Laterality Date    Foot surgery Left 01/2024    Nail bed surgery done at the Podiatrist office with Local anesthesia      Family  History   Problem Relation Age of Onset    Anxiety Father     Substance Abuse Father     Bipolar Disorder Father     Other (Other) Father         substance abuse disorder    Diabetes Mother     Cancer Mother     Anxiety Mother     Other (Other) Mother         substance abuse disorder    Diabetes Sister     Anxiety Sister     Suicide History Sister     Anxiety Brother     OCD Brother     Suicide History Brother       Social History     Socioeconomic History    Marital status: Single   Tobacco Use    Smoking status: Former     Current packs/day: 0.00     Types: Cigarettes    Smokeless tobacco: Never    Tobacco comments:     vaping.    Vaping Use    Vaping status: Former   Substance and Sexual Activity    Alcohol use: Yes     Alcohol/week: 5.0 standard drinks of alcohol     Types: 5 Standard drinks or equivalent per week     Comment: occ    Drug use: Not Currently    Sexual activity: Yes   Other Topics Concern    Caffeine Concern Yes     Comment: 1 cup daily     Exercise Yes     Comment: walking    Seat Belt Yes    Special Diet No    Stress Concern No    Weight Concern Yes           REVIEW OF SYSTEMS:   Today reviewed systens as documented below  GENERAL HEALTH: feels well otherwise  SKIN: Refer to exam below  RESPIRATORY: denies shortness of breath with exertion  CARDIOVASCULAR: denies chest pain on exertion  GI: denies abdominal pain and denies heartburn  NEURO: denies headaches    EXAM:   LMP 08/01/2024 (Exact Date)   GENERAL: well developed, well nourished, in no apparent distress  EXTREMITIES:   1. Integument: The skin on both feet was evaluated there are surgical scars adjacent to her nail borders on both great toes.  The medial and lateral nail borders of the left are symptomatic and now the lateral nail border of the right has similar symptoms did not notice any erythema edema or drainage at this time.   2. Vascular: Patient has palpable pulses   3. Neurologic: Has intact   4. Musculoskeletal: Patient has good  muscle strength.    ASSESSMENT AND PLAN:   Diagnoses and all orders for this visit:    Pain in toes of both feet        Plan: Again advised on the nature and extent of the surgery to open that I just make sure there is no type of recurrent nail or infection we will now add that lateral nail border of her right hallux to the procedures.    The patient indicates understanding of these issues and agrees to the plan.    Arnaud Urrutia DPM

## 2024-10-28 ENCOUNTER — TELEPHONE (OUTPATIENT)
Dept: FAMILY MEDICINE CLINIC | Facility: CLINIC | Age: 29
End: 2024-10-28

## 2024-10-28 NOTE — TELEPHONE ENCOUNTER
Okay, can you call pt tomorrow and make sure she still feels healthy and nothing has changed since she saw me? Then I can addend the H &P.

## 2024-10-28 NOTE — TELEPHONE ENCOUNTER
[facility] with pre-admissions calling to notify that they will need a 24 hour update on H&P for patient before her surgery with Dr. Urrutia on 10/30. Endorsed to [facility].

## 2024-10-29 NOTE — TELEPHONE ENCOUNTER
RN spoke to patient and discussed the need to follow up prior to her procedure. Patient states she is well no changes in her condition or medication since her last visit.

## 2024-10-30 ENCOUNTER — ANESTHESIA (OUTPATIENT)
Dept: SURGERY | Facility: HOSPITAL | Age: 29
End: 2024-10-30
Payer: MEDICAID

## 2024-10-30 ENCOUNTER — HOSPITAL ENCOUNTER (OUTPATIENT)
Facility: HOSPITAL | Age: 29
Setting detail: HOSPITAL OUTPATIENT SURGERY
Discharge: HOME | End: 2024-10-30
Attending: PODIATRIST | Admitting: PODIATRIST
Payer: MEDICAID

## 2024-10-30 ENCOUNTER — APPOINTMENT (OUTPATIENT)
Dept: GENERAL RADIOLOGY | Facility: HOSPITAL | Age: 29
End: 2024-10-30
Attending: PODIATRIST
Payer: MEDICAID

## 2024-10-30 ENCOUNTER — TELEPHONE (OUTPATIENT)
Dept: PODIATRY CLINIC | Facility: CLINIC | Age: 29
End: 2024-10-30

## 2024-10-30 VITALS
SYSTOLIC BLOOD PRESSURE: 87 MMHG | RESPIRATION RATE: 16 BRPM | DIASTOLIC BLOOD PRESSURE: 74 MMHG | HEART RATE: 69 BPM | WEIGHT: 165 LBS | TEMPERATURE: 98 F | HEIGHT: 64 IN | OXYGEN SATURATION: 100 % | BODY MASS INDEX: 28.17 KG/M2

## 2024-10-30 DIAGNOSIS — G89.18 ACUTE POSTOPERATIVE PAIN: ICD-10-CM

## 2024-10-30 DIAGNOSIS — Z98.890 STATUS POST FOOT SURGERY: Primary | ICD-10-CM

## 2024-10-30 LAB — B-HCG UR QL: NEGATIVE

## 2024-10-30 PROCEDURE — 0HDRXZZ EXTRACTION OF TOE NAIL, EXTERNAL APPROACH: ICD-10-PCS | Performed by: PODIATRIST

## 2024-10-30 PROCEDURE — 11750 EXCISION NAIL&NAIL MATRIX: CPT | Performed by: PODIATRIST

## 2024-10-30 RX ORDER — NALOXONE HYDROCHLORIDE 0.4 MG/ML
0.08 INJECTION, SOLUTION INTRAMUSCULAR; INTRAVENOUS; SUBCUTANEOUS AS NEEDED
Status: DISCONTINUED | OUTPATIENT
Start: 2024-10-30 | End: 2024-10-30

## 2024-10-30 RX ORDER — BUPIVACAINE HYDROCHLORIDE 5 MG/ML
INJECTION, SOLUTION EPIDURAL; INTRACAUDAL AS NEEDED
Status: DISCONTINUED | OUTPATIENT
Start: 2024-10-30 | End: 2024-10-30 | Stop reason: HOSPADM

## 2024-10-30 RX ORDER — ONDANSETRON 2 MG/ML
INJECTION INTRAMUSCULAR; INTRAVENOUS AS NEEDED
Status: DISCONTINUED | OUTPATIENT
Start: 2024-10-30 | End: 2024-10-30 | Stop reason: SURG

## 2024-10-30 RX ORDER — SODIUM CHLORIDE, SODIUM LACTATE, POTASSIUM CHLORIDE, CALCIUM CHLORIDE 600; 310; 30; 20 MG/100ML; MG/100ML; MG/100ML; MG/100ML
INJECTION, SOLUTION INTRAVENOUS CONTINUOUS
Status: DISCONTINUED | OUTPATIENT
Start: 2024-10-30 | End: 2024-10-30

## 2024-10-30 RX ORDER — DEXAMETHASONE SODIUM PHOSPHATE 4 MG/ML
VIAL (ML) INJECTION AS NEEDED
Status: DISCONTINUED | OUTPATIENT
Start: 2024-10-30 | End: 2024-10-30 | Stop reason: SURG

## 2024-10-30 RX ORDER — MIDAZOLAM HYDROCHLORIDE 1 MG/ML
INJECTION INTRAMUSCULAR; INTRAVENOUS AS NEEDED
Status: DISCONTINUED | OUTPATIENT
Start: 2024-10-30 | End: 2024-10-30 | Stop reason: SURG

## 2024-10-30 RX ORDER — METOCLOPRAMIDE HYDROCHLORIDE 5 MG/ML
INJECTION INTRAMUSCULAR; INTRAVENOUS AS NEEDED
Status: DISCONTINUED | OUTPATIENT
Start: 2024-10-30 | End: 2024-10-30 | Stop reason: SURG

## 2024-10-30 RX ORDER — HYDROMORPHONE HYDROCHLORIDE 1 MG/ML
0.6 INJECTION, SOLUTION INTRAMUSCULAR; INTRAVENOUS; SUBCUTANEOUS EVERY 5 MIN PRN
Status: DISCONTINUED | OUTPATIENT
Start: 2024-10-30 | End: 2024-10-30

## 2024-10-30 RX ORDER — DEXAMETHASONE SODIUM PHOSPHATE 10 MG/ML
INJECTION, SOLUTION INTRAMUSCULAR; INTRAVENOUS AS NEEDED
Status: DISCONTINUED | OUTPATIENT
Start: 2024-10-30 | End: 2024-10-30 | Stop reason: HOSPADM

## 2024-10-30 RX ORDER — HYDROMORPHONE HYDROCHLORIDE 1 MG/ML
0.4 INJECTION, SOLUTION INTRAMUSCULAR; INTRAVENOUS; SUBCUTANEOUS EVERY 5 MIN PRN
Status: DISCONTINUED | OUTPATIENT
Start: 2024-10-30 | End: 2024-10-30

## 2024-10-30 RX ORDER — SCOLOPAMINE TRANSDERMAL SYSTEM 1 MG/1
1 PATCH, EXTENDED RELEASE TRANSDERMAL ONCE
Status: DISCONTINUED | OUTPATIENT
Start: 2024-10-30 | End: 2024-10-30 | Stop reason: HOSPADM

## 2024-10-30 RX ORDER — HYDROCODONE BITARTRATE AND ACETAMINOPHEN 5; 325 MG/1; MG/1
TABLET ORAL
Qty: 30 TABLET | Refills: 0 | Status: SHIPPED | OUTPATIENT
Start: 2024-10-30

## 2024-10-30 RX ORDER — HYDROMORPHONE HYDROCHLORIDE 1 MG/ML
0.2 INJECTION, SOLUTION INTRAMUSCULAR; INTRAVENOUS; SUBCUTANEOUS EVERY 5 MIN PRN
Status: DISCONTINUED | OUTPATIENT
Start: 2024-10-30 | End: 2024-10-30

## 2024-10-30 RX ORDER — IBUPROFEN 200 MG
CAPSULE ORAL AS NEEDED
Status: DISCONTINUED | OUTPATIENT
Start: 2024-10-30 | End: 2024-10-30 | Stop reason: HOSPADM

## 2024-10-30 RX ORDER — ACETAMINOPHEN 500 MG
1000 TABLET ORAL ONCE
Status: DISCONTINUED | OUTPATIENT
Start: 2024-10-30 | End: 2024-10-30 | Stop reason: HOSPADM

## 2024-10-30 RX ORDER — LIDOCAINE HYDROCHLORIDE 10 MG/ML
INJECTION, SOLUTION EPIDURAL; INFILTRATION; INTRACAUDAL; PERINEURAL AS NEEDED
Status: DISCONTINUED | OUTPATIENT
Start: 2024-10-30 | End: 2024-10-30 | Stop reason: SURG

## 2024-10-30 RX ADMIN — LIDOCAINE HYDROCHLORIDE 50 MG: 10 INJECTION, SOLUTION EPIDURAL; INFILTRATION; INTRACAUDAL; PERINEURAL at 09:57:00

## 2024-10-30 RX ADMIN — DEXAMETHASONE SODIUM PHOSPHATE 4 MG: 4 MG/ML VIAL (ML) INJECTION at 10:09:00

## 2024-10-30 RX ADMIN — ONDANSETRON 4 MG: 2 INJECTION INTRAMUSCULAR; INTRAVENOUS at 10:09:00

## 2024-10-30 RX ADMIN — SODIUM CHLORIDE, SODIUM LACTATE, POTASSIUM CHLORIDE, CALCIUM CHLORIDE: 600; 310; 30; 20 INJECTION, SOLUTION INTRAVENOUS at 10:45:00

## 2024-10-30 RX ADMIN — METOCLOPRAMIDE HYDROCHLORIDE 10 MG: 5 INJECTION INTRAMUSCULAR; INTRAVENOUS at 10:09:00

## 2024-10-30 RX ADMIN — SODIUM CHLORIDE, SODIUM LACTATE, POTASSIUM CHLORIDE, CALCIUM CHLORIDE: 600; 310; 30; 20 INJECTION, SOLUTION INTRAVENOUS at 09:57:00

## 2024-10-30 RX ADMIN — MIDAZOLAM HYDROCHLORIDE 2 MG: 1 INJECTION INTRAMUSCULAR; INTRAVENOUS at 09:57:00

## 2024-10-30 NOTE — BRIEF OP NOTE
Pre-Operative Diagnosis: * No pre-op diagnosis entered *     Post-Operative Diagnosis: * No post-op diagnosis entered *      Procedure Performed:   TOTAL NAIL AVULSION WITH DEBRIDEMENT OF EDGES BILATERAL GREAT TOES    Surgeons and Role:     * Arnaud Urrutia DPM - Primary    Assistant(s):        Surgical Findings: There were recurrent nail edges loaded at the medial lateral nail border of the left hallux there is no recurrence noted on the right     Specimen: None     Estimated Blood Loss: Blood Output: 2 mL (10/30/2024 10:35 AM)      Dictation Number: Pending in epic    Arnaud Urrutia DPM  10/30/2024  10:56 AM

## 2024-10-30 NOTE — DISCHARGE INSTRUCTIONS
Home Care Instructions  Foot Surgeries  Dr JANEL Urrutia.    Operative Site(s)   Keep operative area completely dry.   Place and ice bag over the surgical area 10 minutes out of every half hour (for the first 24 hours  after the surgery.   DO NOT USE HOT WATER BAGS OR ELECTIC HEATING PADS ON YOUR FOOT.   Place a pillow under your calf so that the surgical foot is elevated. Throbbing discomfort can be  helped by keeping the foot elevated.   Remain quiet and off your feet for the first 72 hours. Limit walking to you tolerance, unless  instructed otherwise by your physician. Stay off your feet as much as possible.   Your bandages may become somewhat bloody. Should this occur, do not become alarmed. Do  not remove bandages.   Bend knee and rotate foot and ankle at least 5 minutes during each hour after surgery for 24-36  hours while awake.   Wear your surgical shoe/cast brace when walking, if a surgical shoe/cast brace was dispensed to  you. DO NOT TAKE ANY STEPS WITHOUT THE SHOE/CAST BRACE.   On your return home today, sit sideways in the back seat of the car with the surgical foot  Elevated.    Local Anesthesia - Resume diet at tolerated.    General Anesthesia or Local with Sedation -   Stays in your body about 24 hours.   You may have: headache, soreness and aching, nausea and vomiting, dizziness, tiredness. Sore throat and  hoarseness may be present after general anesthesia only.   Drink liquids and eat light foods. Remain on liquids Only if nausea and vomiting occur. Advance to your  regular diet as tolerated.   No ALCOHOLIC BEVERAGES FOR 24 HOURS.   For the next 24 hours: rest, move cautiously, do not drive or operate equipment, and do not make any  personal or legal decisions.    If prescription pain medication is ordered, fill it immediately -   Take as directed.   Do not take on an empty stomach.   Do not drive or operate equipment.   Do not drink alcohol    Call your doctor for -   Active, persistent bleeding  (call at once), swelling redness at incision site.   Severe pain not controlled by pain medication   Bumping or otherwise injuring your foot or surgical site in ay way (call at once).   Temperature over 101 degrees Fahrenheit.   Persistent nausea and vomiting   Skin rash and general body itchiness.   Persistent throbbing not relieved by elevation and medication.   Nausea and lightheadedness due to medication.   Accidentally getting bandage wet. Dry immediately with absorbent towel, then call the office.   Any other problem not discussed in these instructions.    IN CASE OF EMERGENCY, CALL 911 OR GO TO THE NEAREST EMERGENCY ROOM.    Post -op Appointment -  You will need to make an appointment to see the physician in his office as appointed  If you have any questions, please call [phone]

## 2024-10-30 NOTE — ANESTHESIA POSTPROCEDURE EVALUATION
[facility]    Lory Son Patient Status:  Hospital Outpatient Surgery   Age/Gender 29 year old female MRN [id number]   Location [facility] PERIOPERATIVE SERVICE Attending Arnaud Urrutia DPM   Hosp Day # 0 PCP Ely Ramirez DO       Anesthesia Post-op Note    TOTAL NAIL AVULSION WITH DEBRIDEMENT OF EDGES BILATERAL GREAT TOES    Procedure Summary       Date: 10/30/24 Room / Location:  MAIN OR 11 /  MAIN OR    Anesthesia Start: 0957 Anesthesia Stop:     Procedure: TOTAL NAIL AVULSION WITH DEBRIDEMENT OF EDGES BILATERAL GREAT TOES (Bilateral: Toe) Diagnosis:       Paronychia of toe of left foot      Pain of toe of left foot      (Paronychia of toe of left foot [L03.032])      (Pain of toe of left foot [M79.675])    Surgeons: Arnaud Urrutia DPM Anesthesiologist: James Murillo MD    Anesthesia Type: MAC ASA Status: 1            Anesthesia Type: No value filed.    Vitals Value Taken Time   BP 85/70 10/30/24 1116   Temp 98.5 °F (36.9 °C) 10/30/24 1050   Pulse 62 10/30/24 1117   Resp 16 10/30/24 1059   SpO2 99 % 10/30/24 1117   Vitals shown include unfiled device data.    Patient Location: Same Day Surgery    Anesthesia Type: MAC    Airway Patency: patent    Postop Pain Control: adequate    Mental Status: mildly sedated but able to meaningfully participate in the post-anesthesia evaluation    Nausea/Vomiting: none    Cardiopulmonary/Hydration status: stable euvolemic    Complications: no apparent anesthesia related complications    Postop vital signs: stable    Dental Exam: Unchanged from Preop    Patient to be discharged from PACU when criteria met.

## 2024-10-30 NOTE — ANESTHESIA PREPROCEDURE EVALUATION
PRE-OP EVALUATION    Patient Name: Lory Son    Admit Diagnosis: Paronychia of toe of left foot [L03.032]  Pain of toe of left foot [M79.675]    Pre-op Diagnosis: Paronychia of toe of left foot [L03.032]  Pain of toe of left foot [M79.675]    Revision of Winograd onychoplasty''s medial and lateral nail borders of the left hallux    Anesthesia Procedure: Revision of Winograd onychoplasty''s medial and lateral nail borders of the left hallux (Left)    Surgeons and Role:     * Arnaud Urrutia, DPM - Primary    Pre-op vitals reviewed.  Temp: 97.9 °F (36.6 °C)  Pulse: 72  Resp: 16  BP: 124/84  SpO2: 98 %  Body mass index is 28.32 kg/m².    Current medications reviewed.  Hospital Medications:   acetaminophen (Tylenol Extra Strength) tab 1,000 mg  1,000 mg Oral Once    scopolamine (Transderm-Scop) 1 MG/3DAYS patch 1 patch  1 patch Transdermal Once    lactated ringers infusion   Intravenous Continuous    ceFAZolin (Ancef) 2g in 10mL IV syringe premix  2 g Intravenous Once    ceFAZolin (Ancef) 2 g/10mL IV syringe premix           Outpatient Medications:   Prescriptions Prior to Admission[1]    Allergies: Nsaids      Anesthesia Evaluation    Patient summary reviewed.    Anesthetic Complications  (+) history of anesthetic complications  History of: PONV       GI/Hepatic/Renal    Negative GI/hepatic/renal ROS.                             Cardiovascular        Exercise tolerance: good     MET: >4                                           Endo/Other    Negative endo/other ROS.                              Pulmonary    Negative pulmonary ROS.                       Neuro/Psych      (+) depression  (+) anxiety                              Past Surgical History:   Procedure Laterality Date    Foot surgery Left 01/2024    Nail bed surgery done at the Podiatrist office with Local anesthesia     Social History     Socioeconomic History    Marital status: Single   Tobacco Use    Smoking status: Former     Current packs/day: 0.00      Types: Cigarettes    Smokeless tobacco: Never    Tobacco comments:     vaping.    Vaping Use    Vaping status: Former   Substance and Sexual Activity    Alcohol use: Yes     Alcohol/week: 5.0 standard drinks of alcohol     Types: 5 Standard drinks or equivalent per week     Comment: occ    Drug use: Not Currently    Sexual activity: Yes   Other Topics Concern    Caffeine Concern Yes     Comment: 1 cup daily     Exercise Yes     Comment: walking    Seat Belt Yes    Special Diet No    Stress Concern No    Weight Concern Yes     History   Drug Use Unknown     Available pre-op labs reviewed.  Lab Results   Component Value Date    MCV 91.2 09/23/2024    MCHC 32.8 09/23/2024               Airway      Mallampati: II  Mouth opening: >3 FB  TM distance: 4 - 6 cm  Neck ROM: full Cardiovascular    Cardiovascular exam normal.         Dental  Comment: No saniya loose           Pulmonary    Pulmonary exam normal.                 Other findings              ASA: 1   Plan: MAC  NPO status verified and patient meets guidelines.    Post-procedure pain management plan discussed with surgeon and patient.    Comment: I explained the intrinsic risks of MAC anesthesia to Lory Son including intraoperative awareness/recall, PONV, post-operative pain/discomfort, risk of aspiration, sore throat, airway management and, conversion to general anesthesia. Pt endorses understanding. All questions answered and concerns addressed.      Plan/risks discussed with: patient      Consented to blood products.          Present on Admission:  **None**             [1]   Medications Prior to Admission   Medication Sig Dispense Refill Last Dose/Taking    PHENTERMINE HCL 37.5 MG Oral Tab TAKE 1 TABLET(37.5 MG) BY MOUTH EVERY MORNING BEFORE BREAKFAST. START 1-HALF AND INCREASE TO 1 TABLET IF TOLERATED 30 tablet 0 10/20/2024    SUMAtriptan (IMITREX) 25 MG Oral Tab Take 1 tablet (25 mg total) by mouth every 2 (two) hours as needed for Migraine. Use at  onset; repeat once after 2 HRS-ONLY 2 IN 24 HR MAX 9 tablet 1 Taking As Needed    hydrOXYzine 25 MG Oral Tab  (Patient not taking: Reported on 4/12/2024)

## 2024-10-31 ENCOUNTER — TELEPHONE (OUTPATIENT)
Dept: PODIATRY CLINIC | Facility: CLINIC | Age: 29
End: 2024-10-31

## 2024-10-31 DIAGNOSIS — G89.18 ACUTE POSTOPERATIVE PAIN: Primary | ICD-10-CM

## 2024-10-31 RX ORDER — METHYLPREDNISOLONE 4 MG/1
TABLET ORAL
Qty: 21 TABLET | Refills: 0 | Status: SHIPPED | OUTPATIENT
Start: 2024-10-31

## 2024-10-31 NOTE — TELEPHONE ENCOUNTER
Patient called and she is taking 2 Norco every 4 hours and still having quite a bit of strikethrough pain advised her I will call in a Medrol Dosepak because she is allergic to NSAIDs.  I will see her in follow-up she also had some bleeding but it looks mostly dry at this time recommended that she just use in a new Ace wrap around the area if she desires.

## 2024-11-01 ENCOUNTER — TELEPHONE (OUTPATIENT)
Dept: PODIATRY CLINIC | Facility: CLINIC | Age: 29
End: 2024-11-01

## 2024-11-01 ENCOUNTER — TELEPHONE (OUTPATIENT)
Dept: ORTHOPEDICS CLINIC | Facility: CLINIC | Age: 29
End: 2024-11-01

## 2024-11-01 NOTE — TELEPHONE ENCOUNTER
Procedure date:10/30/2024  Procedure Laterality Anesthesia   TOTAL NAIL AVULSION WITH DEBRIDEMENT OF EDGES BILATERAL GREAT TOES Bilateral MAC     How are you feeling? / Doing OK  Any bleeding?  / Dried blood  Is the dressing dry & intact? / Currently yes  Level of pain?/ 7   Character of pain: / pressure, she loosened ace slightly  Onset of pain:/ 10/31/2024 AM  Aggravating factors:/ elevation  Duration of pain/:constant  Alleviating factors:ice /elevation  Are you taking the prescribed medication? /yes    Medication Quantity Refills Start End   amoxicillin clavulanate 875-125 MG Oral Tab 14        Medication Quantity Refills Start End   HYDROcodone-acetaminophen 5-325 MG Oral Tab       She will start using more tylenol and not taking 2 norco's at one time  Medication Quantity Refills Start End   methylPREDNISolone 4 MG Oral Tablet Therapy Pack       Made aware of maximum of tylenol in 24 hours 3000mg-4000mg in 24 hours including 325mg of norco    Are you following all of the PO instructions?/ good    Other Comments:/ advised better ice placement and increase tylenol so she does not have to take 2 norco    Follow-up appt  date: 11/6/2024    Pt was advised if they have any concerns after hours to call our office and they would be directed to on call physician.  / DONE

## 2024-11-03 NOTE — OPERATIVE REPORT
[facility]   part of [facility]     OPERATIVE REPORT    Lory Son    [facility] [id number] MRN [id number]    1995 Age 29 year old   Admission Date 10/30/2024 Operation Date 10/30/2024   Attending Physician No att. providers found Operating Physician Arnaud Urrutia DPM   PCP Ely Ramirez DO             PREOPERATIVE DIAGNOSIS: Nail edge recurrence medial and lateral nail borders of the hallux bilateral feet status post winograd onychoplasty  POSTOPERATIVE DIAGNOSIS: Same  PROCEDURE: Total nail avulsion with debridement of the medial and lateral nail borders of the hallux bilateral feet     ANESTHESIA:  Local with light sedation, utilizing 0.5% Marcaine plain.  10 cc total of 0.5% Marcaine plain to be a hallux block this was total it was 5 cc per hallux     HEMOSTASIS: Penrose drain at the base of the hallux     ESTIMATED BLOOD LOSS: 2 cc     INDICATIONS:  This 29 year old female presented with patient had wanted right onychoplasty's performed but there were recurrent nail edges which were causing pain on both medial lateral nail borders.  FINDINGS: There were recurrent nail edges on the medial lateral nail borders of the left hallux but none were noted on the right.     SPECIMENS: None     COMPLICATIONS:  None.     DRAINS: None     OPERATIVE TECHNIQUE:    The patient was brought into the operating with vital signs stable and placed in supine position on the operating table with all personnel present timeout was taken there were no additions deletions or concerns reported.  The patient was placed under sedation the feet were prepped and draped using usual aseptic technique.  Both great toes were then anesthetized as documented above.  Penrose drain was attached to the base of the left hallux.  The hallux nail on the left was a avulsed.  An incision was made in the previous Winograd incision but there was no ellipse taken.  It was deepened on both the medial and lateral nail edges.  The medial nail  edge of the left hallux shows significant recurrence and there is only minor recurrence on the lateral nail border.  The incision was dissected medially and laterally on both borders.  The base of the phalanx medially and laterally where the nail matrix was was evaluated and then debrided utilizing a rongeur and a curette.  The area was flushed with copious amounts of saline and 1 stitch was applied to the proximal incision line of 3-0 nylon.  Postoperative sites were injected with a total of 5 mg of dexamethasone divided between the 2 sides of the left hallux.  Sterile postoperative dressing consisting of Adaptic sterile gauze bacitracin ointment 3 inch Yesica and an Ace wrap was applied.  For all intents and purposes a similar procedure was performed on the right as well as on the left however there were no excessive recurrent nail edges noted but the bone was still debrided of any nail matrix which may have been remaining to help prevent any recurrence on the right hallux.  A dressing was applied in the similar fashion.  The patient tolerated the above anesthesia procedure well left the operating with vital signs stable and vascular status of both feet intact to recovery room.  Should be noted that when the Penrose drains were removed the hallux return notes uniform pink color and was warm to the touch.  Bilateral.      Arnaud Urrutia DPM

## 2024-11-06 ENCOUNTER — OFFICE VISIT (OUTPATIENT)
Dept: PODIATRY CLINIC | Facility: CLINIC | Age: 29
End: 2024-11-06

## 2024-11-06 DIAGNOSIS — Z98.890 STATUS POST FOOT SURGERY: ICD-10-CM

## 2024-11-06 DIAGNOSIS — G89.18 ACUTE POSTOPERATIVE PAIN: Primary | ICD-10-CM

## 2024-11-06 PROCEDURE — 99024 POSTOP FOLLOW-UP VISIT: CPT | Performed by: PODIATRIST

## 2024-11-06 RX ORDER — HYDROCODONE BITARTRATE AND ACETAMINOPHEN 5; 325 MG/1; MG/1
1 TABLET ORAL EVERY 6 HOURS PRN
Qty: 20 TABLET | Refills: 0 | Status: SHIPPED | OUTPATIENT
Start: 2024-11-06

## 2024-11-13 ENCOUNTER — OFFICE VISIT (OUTPATIENT)
Dept: PODIATRY CLINIC | Facility: CLINIC | Age: 29
End: 2024-11-13

## 2024-11-13 DIAGNOSIS — Z98.890 STATUS POST FOOT SURGERY: Primary | ICD-10-CM

## 2024-11-13 PROCEDURE — 99024 POSTOP FOLLOW-UP VISIT: CPT | Performed by: PODIATRIST

## 2024-11-13 NOTE — PROGRESS NOTES
Lory Son is a 29 year old female.   Chief Complaint   Patient presents with    Post-Op     S/p B/l hallux  - Pt denies any pain. No fever or chills.          HPI:   Patient is 2 weeks status post surgery doing well no complaints of pain little discomfort.  At today's visit reviewed nurse's history as taken above, allergies medications and medical history as documented below.  All changes duly noted  Allergies: Nsaids   Current Outpatient Medications   Medication Sig Dispense Refill    HYDROcodone-acetaminophen 5-325 MG Oral Tab Take 1 tablet by mouth every 6 (six) hours as needed for Pain. 20 tablet 0    methylPREDNISolone 4 MG Oral Tablet Therapy Pack Take per package insert (instructions). 21 tablet 0    amoxicillin clavulanate 875-125 MG Oral Tab Take 1 tablet by mouth 2 (two) times daily. 14 tablet 0    HYDROcodone-acetaminophen 5-325 MG Oral Tab Take 1 tablet every 4 hours or 2 tablets every 6 hours as needed for postoperative pain 30 tablet 0    PHENTERMINE HCL 37.5 MG Oral Tab TAKE 1 TABLET(37.5 MG) BY MOUTH EVERY MORNING BEFORE BREAKFAST. START 1-HALF AND INCREASE TO 1 TABLET IF TOLERATED 30 tablet 0    hydrOXYzine 25 MG Oral Tab  (Patient not taking: Reported on 11/6/2024)      SUMAtriptan (IMITREX) 25 MG Oral Tab Take 1 tablet (25 mg total) by mouth every 2 (two) hours as needed for Migraine. Use at onset; repeat once after 2 HRS-ONLY 2 IN 24 HR MAX 9 tablet 1      Past Medical History:    Abdominal pain    Bad breath    Belching    Bleeding nose    Bloating    Blood in the stool    COVID-19    Decorative tattoo    Depression    Diarrhea, unspecified    Enlarged lymph node    Fatigue    Flatulence/gas pain/belching    Food intolerance    Frequent urination    Frequent UTI    Headache disorder    History of depression    History of mental disorder    Hyperhidrosis    Pt pt \"recently diagnosed\"    Indigestion    Irregular bowel habits    Menses painful    Nausea    Night sweats    Pain with bowel  movements    PONV (postoperative nausea and vomiting)    has never had anesthesia, but is highly suseptable to Nausea and vomitting    Stress    Vomiting    Weight gain      Past Surgical History:   Procedure Laterality Date    Foot surgery Left 01/2024    Nail bed surgery done at the Podiatrist office with Local anesthesia      Family History   Problem Relation Age of Onset    Anxiety Father     Substance Abuse Father     Bipolar Disorder Father     Other (Other) Father         substance abuse disorder    Diabetes Mother     Cancer Mother     Anxiety Mother     Other (Other) Mother         substance abuse disorder    Diabetes Sister     Anxiety Sister     Suicide History Sister     Anxiety Brother     OCD Brother     Suicide History Brother       Social History     Socioeconomic History    Marital status: Single   Tobacco Use    Smoking status: Former     Current packs/day: 0.00     Types: Cigarettes    Smokeless tobacco: Never    Tobacco comments:     vaping.    Vaping Use    Vaping status: Former   Substance and Sexual Activity    Alcohol use: Yes     Alcohol/week: 5.0 standard drinks of alcohol     Types: 5 Standard drinks or equivalent per week     Comment: occ    Drug use: Not Currently    Sexual activity: Yes   Other Topics Concern    Caffeine Concern Yes     Comment: 1 cup daily     Exercise Yes     Comment: walking    Seat Belt Yes    Special Diet No    Stress Concern No    Weight Concern Yes           REVIEW OF SYSTEMS:   Today reviewed systens as documented below  GENERAL HEALTH: feels well otherwise  SKIN: Refer to exam below  RESPIRATORY: denies shortness of breath with exertion  CARDIOVASCULAR: denies chest pain on exertion  GI: denies abdominal pain and denies heartburn  NEURO: denies headaches    EXAM:   LMP 08/01/2024 (Exact Date)   GENERAL: well developed, well nourished, in no apparent distress  EXTREMITIES:   The skin incisions on both feet were evaluated is warm and dry.  There is no sign of  infection sutures removed.  No dehiscence.  ASSESSMENT AND PLAN:   Diagnoses and all orders for this visit:    Status post foot surgery        Plan: Patient can resume normal bathing but should not soak her feet in the bottom of the shower for too long afterwards to just dry and then apply the mupirocin ointment and a Band-Aid and then follow-up with us again in 2 weeks call if there is any problems.    The patient indicates understanding of these issues and agrees to the plan.    Arnaud Urrutia DPM

## 2024-11-13 NOTE — PROGRESS NOTES
Lory Son is a 29 year old female.   Chief Complaint   Patient presents with    Post-Op     1st bilateral hallux ingrown removal-  patient denies pain          HPI:   Patient returns to clinic 1 week status post doing well but requesting refill on her pain medication because she needs to sleep at night.  At today's visit reviewed nurse's history as taken above, allergies medications and medical history as documented below.  All changes duly noted  Allergies: Nsaids   Current Outpatient Medications   Medication Sig Dispense Refill    HYDROcodone-acetaminophen 5-325 MG Oral Tab Take 1 tablet by mouth every 6 (six) hours as needed for Pain. 20 tablet 0    methylPREDNISolone 4 MG Oral Tablet Therapy Pack Take per package insert (instructions). 21 tablet 0    amoxicillin clavulanate 875-125 MG Oral Tab Take 1 tablet by mouth 2 (two) times daily. 14 tablet 0    HYDROcodone-acetaminophen 5-325 MG Oral Tab Take 1 tablet every 4 hours or 2 tablets every 6 hours as needed for postoperative pain 30 tablet 0    PHENTERMINE HCL 37.5 MG Oral Tab TAKE 1 TABLET(37.5 MG) BY MOUTH EVERY MORNING BEFORE BREAKFAST. START 1-HALF AND INCREASE TO 1 TABLET IF TOLERATED 30 tablet 0    SUMAtriptan (IMITREX) 25 MG Oral Tab Take 1 tablet (25 mg total) by mouth every 2 (two) hours as needed for Migraine. Use at onset; repeat once after 2 HRS-ONLY 2 IN 24 HR MAX 9 tablet 1    hydrOXYzine 25 MG Oral Tab  (Patient not taking: Reported on 11/6/2024)        Past Medical History:    Abdominal pain    Bad breath    Belching    Bleeding nose    Bloating    Blood in the stool    COVID-19    Decorative tattoo    Depression    Diarrhea, unspecified    Enlarged lymph node    Fatigue    Flatulence/gas pain/belching    Food intolerance    Frequent urination    Frequent UTI    Headache disorder    History of depression    History of mental disorder    Hyperhidrosis    Pt pt \"recently diagnosed\"    Indigestion    Irregular bowel habits    Menses painful     Nausea    Night sweats    Pain with bowel movements    PONV (postoperative nausea and vomiting)    has never had anesthesia, but is highly suseptable to Nausea and vomitting    Stress    Vomiting    Weight gain      Past Surgical History:   Procedure Laterality Date    Foot surgery Left 01/2024    Nail bed surgery done at the Podiatrist office with Local anesthesia      Family History   Problem Relation Age of Onset    Anxiety Father     Substance Abuse Father     Bipolar Disorder Father     Other (Other) Father         substance abuse disorder    Diabetes Mother     Cancer Mother     Anxiety Mother     Other (Other) Mother         substance abuse disorder    Diabetes Sister     Anxiety Sister     Suicide History Sister     Anxiety Brother     OCD Brother     Suicide History Brother       Social History     Socioeconomic History    Marital status: Single   Tobacco Use    Smoking status: Former     Current packs/day: 0.00     Types: Cigarettes    Smokeless tobacco: Never    Tobacco comments:     vaping.    Vaping Use    Vaping status: Former   Substance and Sexual Activity    Alcohol use: Yes     Alcohol/week: 5.0 standard drinks of alcohol     Types: 5 Standard drinks or equivalent per week     Comment: occ    Drug use: Not Currently    Sexual activity: Yes   Other Topics Concern    Caffeine Concern Yes     Comment: 1 cup daily     Exercise Yes     Comment: walking    Seat Belt Yes    Special Diet No    Stress Concern No    Weight Concern Yes           REVIEW OF SYSTEMS:   Today reviewed systens as documented below  GENERAL HEALTH: feels well otherwise  SKIN: Refer to exam below  RESPIRATORY: denies shortness of breath with exertion  CARDIOVASCULAR: denies chest pain on exertion  GI: denies abdominal pain and denies heartburn  NEURO: denies headaches    EXAM:   LMP 08/01/2024 (Exact Date)   GENERAL: well developed, well nourished, in no apparent distress  EXTREMITIES:   There is mild dried hemorrhagic strikethrough.   Removal shows no sign of infection  ASSESSMENT AND PLAN:   Diagnoses and all orders for this visit:    Acute postoperative pain  -     HYDROcodone-acetaminophen 5-325 MG Oral Tab; Take 1 tablet by mouth every 6 (six) hours as needed for Pain.    Status post foot surgery  -     HYDROcodone-acetaminophen 5-325 MG Oral Tab; Take 1 tablet by mouth every 6 (six) hours as needed for Pain.        Plan: Septic dressing reapplied.  Represcribe 20 tablets of Norco will see the patient again in 1 week for reevaluation and suture removal but sooner if required.  Continue with surgical shoe time off work at all physical restrictions keep the dressings clean and dry.    The patient indicates understanding of these issues and agrees to the plan.    Arnaud Urrutia DPM

## 2024-11-21 ENCOUNTER — TELEPHONE (OUTPATIENT)
Dept: PODIATRY CLINIC | Facility: CLINIC | Age: 29
End: 2024-11-21

## 2024-11-21 NOTE — TELEPHONE ENCOUNTER
Yes she can go back to work as long as she is wearing a Band-Aid and an enclosed shoe that does not rub against the great toe

## 2024-11-21 NOTE — TELEPHONE ENCOUNTER
Spoke with patient and let her know she can return to work, and with provider stipulations. She states she has tried on the shoes she will wear and is comfortable returning to work. She is experiencing no pain. They are requesting a note, and I wrote it for her to return to work tomorrow. Sent via [employer] per patient request. Confirmed follow up on Wednesday.

## 2024-11-21 NOTE — TELEPHONE ENCOUNTER
Patient s/p bilateral nail procedure on 10/30/24- Please advise if we could release patient back to work at this time

## 2024-11-21 NOTE — TELEPHONE ENCOUNTER
Patient  had surgery with Dr Urrutia on 10/30 and she stating she feels ready to go back to work would like to know what Dr Urrutia suggest.Please advise

## 2024-11-27 ENCOUNTER — OFFICE VISIT (OUTPATIENT)
Dept: PODIATRY CLINIC | Facility: CLINIC | Age: 29
End: 2024-11-27
Payer: MEDICAID

## 2024-11-27 DIAGNOSIS — Z98.890 STATUS POST FOOT SURGERY: Primary | ICD-10-CM

## 2024-11-27 DIAGNOSIS — L60.0 ONYCHOCRYPTOSIS: ICD-10-CM

## 2024-11-27 PROCEDURE — 99024 POSTOP FOLLOW-UP VISIT: CPT | Performed by: STUDENT IN AN ORGANIZED HEALTH CARE EDUCATION/TRAINING PROGRAM

## 2024-11-27 NOTE — PROGRESS NOTES
Lory Son is a 29 year old female.   No chief complaint on file.        HPI:   Patient is 4 weeks status post surgery with Dr. Urrutia. She is doing and without complaints..  At today's visit reviewed nurse's history as taken above, allergies medications and medical history as documented below.  All changes duly noted  Allergies: Nsaids   Current Outpatient Medications   Medication Sig Dispense Refill    HYDROcodone-acetaminophen 5-325 MG Oral Tab Take 1 tablet by mouth every 6 (six) hours as needed for Pain. 20 tablet 0    methylPREDNISolone 4 MG Oral Tablet Therapy Pack Take per package insert (instructions). 21 tablet 0    amoxicillin clavulanate 875-125 MG Oral Tab Take 1 tablet by mouth 2 (two) times daily. 14 tablet 0    HYDROcodone-acetaminophen 5-325 MG Oral Tab Take 1 tablet every 4 hours or 2 tablets every 6 hours as needed for postoperative pain 30 tablet 0    PHENTERMINE HCL 37.5 MG Oral Tab TAKE 1 TABLET(37.5 MG) BY MOUTH EVERY MORNING BEFORE BREAKFAST. START 1-HALF AND INCREASE TO 1 TABLET IF TOLERATED 30 tablet 0    hydrOXYzine 25 MG Oral Tab  (Patient not taking: Reported on 11/6/2024)      SUMAtriptan (IMITREX) 25 MG Oral Tab Take 1 tablet (25 mg total) by mouth every 2 (two) hours as needed for Migraine. Use at onset; repeat once after 2 HRS-ONLY 2 IN 24 HR MAX 9 tablet 1      Past Medical History:    Abdominal pain    Bad breath    Belching    Bleeding nose    Bloating    Blood in the stool    COVID-19    Decorative tattoo    Depression    Diarrhea, unspecified    Enlarged lymph node    Fatigue    Flatulence/gas pain/belching    Food intolerance    Frequent urination    Frequent UTI    Headache disorder    History of depression    History of mental disorder    Hyperhidrosis    Pt pt \"recently diagnosed\"    Indigestion    Irregular bowel habits    Menses painful    Nausea    Night sweats    Pain with bowel movements    PONV (postoperative nausea and vomiting)    has never had anesthesia, but  is highly suseptable to Nausea and vomitting    Stress    Vomiting    Weight gain      Past Surgical History:   Procedure Laterality Date    Foot surgery Left 01/2024    Nail bed surgery done at the Podiatrist office with Local anesthesia      Family History   Problem Relation Age of Onset    Anxiety Father     Substance Abuse Father     Bipolar Disorder Father     Other (Other) Father         substance abuse disorder    Diabetes Mother     Cancer Mother     Anxiety Mother     Other (Other) Mother         substance abuse disorder    Diabetes Sister     Anxiety Sister     Suicide History Sister     Anxiety Brother     OCD Brother     Suicide History Brother       Social History     Socioeconomic History    Marital status: Single   Tobacco Use    Smoking status: Former     Current packs/day: 0.00     Types: Cigarettes    Smokeless tobacco: Never    Tobacco comments:     vaping.    Vaping Use    Vaping status: Former   Substance and Sexual Activity    Alcohol use: Yes     Alcohol/week: 5.0 standard drinks of alcohol     Types: 5 Standard drinks or equivalent per week     Comment: occ    Drug use: Not Currently    Sexual activity: Yes   Other Topics Concern    Caffeine Concern Yes     Comment: 1 cup daily     Exercise Yes     Comment: walking    Seat Belt Yes    Special Diet No    Stress Concern No    Weight Concern Yes           REVIEW OF SYSTEMS:   Today reviewed systens as documented below  GENERAL HEALTH: feels well otherwise  SKIN: Refer to exam below  RESPIRATORY: denies shortness of breath with exertion  CARDIOVASCULAR: denies chest pain on exertion  GI: denies abdominal pain and denies heartburn  NEURO: denies headaches    EXAM:   LMP 08/01/2024 (Exact Date)   GENERAL: well developed, well nourished, in no apparent distress  EXTREMITIES:       [place] sites well healed and without concerns.  ASSESSMENT AND PLAN:   Diagnoses and all orders for this visit:    Status post foot  surgery    Onychocryptosis        Plan:   -Can continue normal activity as tolerated complaining pain be guide.    -Can slowly DC mupirocin ointment application and Band-Aid application as tolerated.    -Can follow-up with Dr. Urrutia in 2 to 3 weeks if any concerns persist.  Otherwise can be discharged in surgical care.  Sites appear well-healed.      The patient indicates understanding of these issues and agrees to the plan.    Yaya Ramirez DPM, 11/27/24, 9:28 AM

## 2024-11-30 DIAGNOSIS — E66.811 CLASS 1 OBESITY WITH BODY MASS INDEX (BMI) OF 31.0 TO 31.9 IN ADULT, UNSPECIFIED OBESITY TYPE, UNSPECIFIED WHETHER SERIOUS COMORBIDITY PRESENT: ICD-10-CM

## 2024-12-02 RX ORDER — PHENTERMINE HYDROCHLORIDE 37.5 MG/1
37.5 TABLET ORAL
Qty: 30 TABLET | Refills: 2 | Status: SHIPPED | OUTPATIENT
Start: 2024-12-02

## 2024-12-02 NOTE — TELEPHONE ENCOUNTER
I did have a long discussion with patient's daughter.  Patient was present.  She has had some further decline.  She is having trouble with her strength and even getting up off of a couch at times.  She need some PT and OT in the home.  Daughter is trying to get her to do some exercises to strengthen her upper extremities.  Her depression is worse.  She has been on sertraline.  BuSpar seem to make things worse and that was discontinued.  We are going to add some Wellbutrin to see if it augments the effect.   Pt failed refill protocol for the following reasons:  Requested Renewals     Name from pharmacy: PHENTERMINE 37.5MG TABLETS         Will file in chart as: PHENTERMINE HCL 37.5 MG Oral Tab    Sig: TAKE 1 TABLET(37.5 MG) BY MOUTH EVERY MORNING BEFORE BREAKFAST. START ONE-HALF AND. INCREASE TO 1 TABLET IF TOLERATED    Disp: 30 tablet    Refills: 0 (Pharmacy requested: Not specified)    Start: 11/30/2024    Class: Normal    Non-formulary For: Class 1 obesity with body mass index (BMI) of 31.0 to 31.9 in adult, unspecified obesity type, unspecified whether serious comorbidity present    Last ordered: 2 months ago (9/16/2024) by Ely Ramirez DO    Last refill: 9/16/2024    Rx #: [id number]    Controlled Substance Medication [id number]/30/2024 11:34 PM    This medication is a controlled substance - forward to provider to refill      To be filled at: [facility] [address] [place] [address] [address] AT [facility] [address], [phone], [phone]            Last refill: 9/16/24  Last appt: 10/1/24  Next appt:   Future Appointments   Date Time Provider Department Center   12/16/2024  8:45 AM Arnaud Urrutia DPM [id number]         Forward to Dr. Ramirez, please advise on refills. Thank you.

## 2025-01-09 ENCOUNTER — OFFICE VISIT (OUTPATIENT)
Dept: FAMILY MEDICINE CLINIC | Facility: CLINIC | Age: 30
End: 2025-01-09
Payer: MEDICAID

## 2025-01-09 ENCOUNTER — TELEPHONE (OUTPATIENT)
Dept: FAMILY MEDICINE CLINIC | Facility: CLINIC | Age: 30
End: 2025-01-09

## 2025-01-09 VITALS
HEART RATE: 87 BPM | OXYGEN SATURATION: 98 % | DIASTOLIC BLOOD PRESSURE: 78 MMHG | TEMPERATURE: 97 F | RESPIRATION RATE: 18 BRPM | BODY MASS INDEX: 29 KG/M2 | SYSTOLIC BLOOD PRESSURE: 126 MMHG | WEIGHT: 169.63 LBS

## 2025-01-09 DIAGNOSIS — N92.6 MISSED PERIOD: ICD-10-CM

## 2025-01-09 DIAGNOSIS — N89.8 VAGINAL ITCHING: ICD-10-CM

## 2025-01-09 DIAGNOSIS — N89.8 VAGINAL DISCHARGE: Primary | ICD-10-CM

## 2025-01-09 DIAGNOSIS — J18.9 COMMUNITY ACQUIRED PNEUMONIA, UNSPECIFIED LATERALITY: ICD-10-CM

## 2025-01-09 DIAGNOSIS — N89.8 VAGINAL SORE: ICD-10-CM

## 2025-01-09 LAB
APPEARANCE: CLEAR
BILIRUBIN: NEGATIVE
CONTROL LINE PRESENT WITH A CLEAR BACKGROUND (YES/NO): YES YES/NO
GLUCOSE (URINE DIPSTICK): NEGATIVE MG/DL
KETONES (URINE DIPSTICK): NEGATIVE MG/DL
KIT LOT #: NORMAL NUMERIC
LEUKOCYTES: NEGATIVE
MULTISTIX EXPIRATION DATE: NORMAL DATE
MULTISTIX LOT#: NORMAL NUMERIC
NITRITE, URINE: NEGATIVE
OCCULT BLOOD: NEGATIVE
PH, URINE: 6 (ref 4.5–8)
PROTEIN (URINE DIPSTICK): NEGATIVE MG/DL
SPECIFIC GRAVITY: 1.02 (ref 1–1.03)
URINE-COLOR: YELLOW
UROBILINOGEN,SEMI-QN: 0.2 MG/DL (ref 0–1.9)

## 2025-01-09 PROCEDURE — 87491 CHLMYD TRACH DNA AMP PROBE: CPT | Performed by: FAMILY MEDICINE

## 2025-01-09 PROCEDURE — 87529 HSV DNA AMP PROBE: CPT | Performed by: FAMILY MEDICINE

## 2025-01-09 PROCEDURE — 81514 NFCT DS BV&VAGINITIS DNA ALG: CPT | Performed by: FAMILY MEDICINE

## 2025-01-09 PROCEDURE — 87591 N.GONORRHOEAE DNA AMP PROB: CPT | Performed by: FAMILY MEDICINE

## 2025-01-09 NOTE — PROGRESS NOTES
Lory Son is a 29 year old female.  Chief Complaint   Patient presents with    Lesion     Vagina        HPI:   Dx with PNA 12/29.     Arm pain: getting better.     Sore on vagina. Thinks she had something similar in the past. Thinks she was sick around the same time.   Also late on period. Took preg test about a week ago and it was neg. Some cramping, no spotting.     There are 2 sores next to each other, between anus and vagina. Looked like a scrape. Woke up with it yesterday AM. Woke up to itching, though it was a spider bite. Has also had more discharge than usual. Then very painful.   Last intercourse was Sunday, that was not painful. Current BF does not have herpes.     ALLERGIES:  Allergies[1]      Current Outpatient Medications   Medication Sig Dispense Refill    Phentermine HCl 37.5 MG Oral Tab Take 1 tablet (37.5 mg total) by mouth every morning before breakfast. 30 tablet 2    amoxicillin clavulanate 875-125 MG Oral Tab Take 1 tablet by mouth 2 (two) times daily. 14 tablet 0    SUMAtriptan (IMITREX) 25 MG Oral Tab Take 1 tablet (25 mg total) by mouth every 2 (two) hours as needed for Migraine. Use at onset; repeat once after 2 HRS-ONLY 2 IN 24 HR MAX 9 tablet 1    HYDROcodone-acetaminophen 5-325 MG Oral Tab Take 1 tablet by mouth every 6 (six) hours as needed for Pain. (Patient not taking: Reported on 1/9/2025) 20 tablet 0    methylPREDNISolone 4 MG Oral Tablet Therapy Pack Take per package insert (instructions). (Patient not taking: Reported on 1/9/2025) 21 tablet 0    HYDROcodone-acetaminophen 5-325 MG Oral Tab Take 1 tablet every 4 hours or 2 tablets every 6 hours as needed for postoperative pain (Patient not taking: Reported on 1/9/2025) 30 tablet 0    hydrOXYzine 25 MG Oral Tab  (Patient not taking: Reported on 1/9/2025)        Past Medical History:    Abdominal pain    Bad breath    Belching    Bleeding nose    Bloating    Blood in the stool    COVID-19    Decorative tattoo    Depression     Diarrhea, unspecified    Enlarged lymph node    Fatigue    Flatulence/gas pain/belching    Food intolerance    Frequent urination    Frequent UTI    Headache disorder    History of depression    History of mental disorder    Hyperhidrosis    Pt pt \"recently diagnosed\"    Indigestion    Irregular bowel habits    Menses painful    Nausea    Night sweats    Pain with bowel movements    PONV (postoperative nausea and vomiting)    has never had anesthesia, but is highly suseptable to Nausea and vomitting    Stress    Vomiting    Weight gain      Social History:  Social History     Socioeconomic History    Marital status: Single   Tobacco Use    Smoking status: Former     Current packs/day: 0.00     Types: Cigarettes    Smokeless tobacco: Never    Tobacco comments:     vaping.    Vaping Use    Vaping status: Former   Substance and Sexual Activity    Alcohol use: Yes     Alcohol/week: 5.0 standard drinks of alcohol     Types: 5 Standard drinks or equivalent per week     Comment: occ    Drug use: Not Currently    Sexual activity: Yes   Other Topics Concern    Caffeine Concern Yes     Comment: 1 cup daily     Exercise Yes     Comment: walking    Seat Belt Yes    Special Diet No    Stress Concern No    Weight Concern Yes     Social Drivers of Health     Financial Resource Strain: Not on File (4/10/2024)    Received from [facility]    Financial Resource Strain     Financial Resource Strain: 0   Food Insecurity: No Food Insecurity (1/9/2025)    NCSS - Food Insecurity     Worried About Running Out of Food in the Last Year: No     Ran Out of Food in the Last Year: No   Transportation Needs: No Transportation Needs (1/9/2025)    NCSS - Transportation     Lack of Transportation: No   Physical Activity: Not on File (4/10/2024)    Received from [facility]    Physical Activity     Physical Activity: 0   Stress: Not on File (4/10/2024)    Received from [facility]    Stress     Stress: 0   Social Connections: Not on File (9/16/2024)    Received from  [facility]    Social Connections     Connectedness: 0   Housing Stability: Not At Risk (1/9/2025)    NCSS - Housing/Utilities     Has Housing: Yes     Worried About Losing Housing: No     Unable to Get Utilities: No        BP Readings from Last 6 Encounters:   01/09/25 126/78   10/30/24 (!) 87/74   10/01/24 120/70   09/26/24 114/72   09/23/24 128/88   08/04/24 117/77       Wt Readings from Last 6 Encounters:   01/09/25 169 lb 9.6 oz (76.9 kg)   10/30/24 165 lb (74.8 kg)   10/01/24 164 lb 3.2 oz (74.5 kg)   09/26/24 162 lb 8 oz (73.7 kg)   09/23/24 168 lb (76.2 kg)   08/04/24 170 lb (77.1 kg)       REVIEW OF SYSTEMS:   GENERAL HEALTH: feels well no complaints other than above   SKIN: denies any unusual skin lesions or rashes  RESPIRATORY: denies shortness of breath    CARDIOVASCULAR: denies chest pain   GI: denies abdominal pain and denies heartburn  NEURO: denies headaches or dizziness     EXAM:   /78   Pulse 87   Temp 97.3 °F (36.3 °C) (Temporal)   Resp 18   Wt 169 lb 9.6 oz (76.9 kg)   LMP 12/01/2024 (Exact Date)   SpO2 98%   BMI 29.11 kg/m²  Body mass index is 29.11 kg/m².    GENERAL: well developed, well nourished,in no apparent distress  SKIN: no rashes,no suspicious lesions  HEENT: atraumatic, normocephalic,ears and throat are clear  NECK: supple,no adenopathy,   LUNGS: clear to auscultation, no rales or wheezing   CARDIO: RRR without murmur  GI: good BS's,no masses, HSM or tenderness  : tiny blister-like lesion at bottom of vaginal introitus, swabbed for herpes, internal exam done, some white adherent discharge, no odor, some right adnexal tenderness, no mass, swabs taken for vaginosis and gc/chlamydia.  EXTREMITIES: no cyanosis, clubbing or edema    ASSESSMENT AND PLAN:     Encounter Diagnoses   Name Primary?    Vaginal discharge Yes    Vaginal itching     Vaginal sore     Missed period     Community acquired pneumonia, unspecified laterality        Diagnoses and all orders for this  visit:    Vaginal discharge  -     Vaginitis Vaginosis PCR Panel; Future  -     Chlamydia/Gc Amplification [E]; Future  - testing as ordered. Treat when results are back as needed. Suspect yeast infection after abx.     Vaginal itching  -     Vaginitis Vaginosis PCR Panel; Future  -     Chlamydia/Gc Amplification [E]; Future  -     URINALYSIS, AUTO, W/O SCOPE    Vaginal sore  -     Vaginitis Vaginosis PCR Panel; Future  -     HSV 1/2 Subtype by PCR (Lesion-Only); Future  - testing done. Hold off on valtrex, does not appear likely to be herpes, very tiny, but will treat if positive.     Missed period  -     Pregnancy Test, Urine [E]; Future  - preg test neg. Monitor for now.     Community acquired pneumonia, unspecified laterality  -     XR CHEST PA + LAT CHEST (CPT=71046); Future    Lungs are clear today, repeat CXR in 2-3 weeks to make sure PNA is resolved.       Orders Placed This Encounter   Procedures    HSV 1/2 Subtype by PCR (Lesion-Only)     Standing Status:   Future     Number of Occurrences:   1     Standing Expiration Date:   1/9/2026     Order Specific Question:   Spec comment:     Answer:   vaginal lesion     Order Specific Question:   Release to patient     Answer:   Immediate    URINALYSIS, AUTO, W/O SCOPE     Order Specific Question:   Release to patient     Answer:   Immediate    Pregnancy Test, Urine [E]     Standing Status:   Future     Standing Expiration Date:   1/9/2026     Order Specific Question:   Release to patient     Answer:   Immediate               Meds & Refills for this Visit:  Requested Prescriptions      No prescriptions requested or ordered in this encounter             The patient indicates understanding of these issues and agrees to the plan.               [1]   Allergies  Allergen Reactions    Nsaids HIVES and SWELLING     Angioedema upper lip

## 2025-01-11 ENCOUNTER — PATIENT MESSAGE (OUTPATIENT)
Dept: FAMILY MEDICINE CLINIC | Facility: CLINIC | Age: 30
End: 2025-01-11

## 2025-01-11 DIAGNOSIS — N89.8 VAGINAL SORE: Primary | ICD-10-CM

## 2025-01-11 RX ORDER — VALACYCLOVIR HYDROCHLORIDE 1 G/1
1000 TABLET, FILM COATED ORAL EVERY 12 HOURS SCHEDULED
Qty: 14 TABLET | Refills: 0 | Status: SHIPPED | OUTPATIENT
Start: 2025-01-11 | End: 2025-01-18

## 2025-01-13 LAB
HSV 1 NAA: POSITIVE
HSV 1 NAA: POSITIVE
HSV 2 NAA: NEGATIVE
HSV 2 NAA: NEGATIVE

## 2025-01-16 ENCOUNTER — OFFICE VISIT (OUTPATIENT)
Dept: PODIATRY CLINIC | Facility: CLINIC | Age: 30
End: 2025-01-16

## 2025-01-16 DIAGNOSIS — Z98.890 STATUS POST NAIL SURGERY: Primary | ICD-10-CM

## 2025-01-16 PROCEDURE — 99213 OFFICE O/P EST LOW 20 MIN: CPT | Performed by: PODIATRIST

## 2025-01-19 NOTE — PROGRESS NOTES
Lory Son is a 29 year old female.   Chief Complaint   Patient presents with    Post-Op     Post op bilateral hallux- pain left 5/10         HPI:   Returns in clinic postop on her revision onychoplasty's doing fairly well but has a little tenderness she points to the medial nail border of her right hallux.  No sign of recurrence at today's visit reviewed nurse's history as taken above, allergies medications and medical history as documented below.  All changes duly noted  Allergies: Nsaids   Current Outpatient Medications   Medication Sig Dispense Refill    Acyclovir 5 % External Cream Apply 1 Application topically every 3 (three) hours for 5 days. 5 g 2    Phentermine HCl 37.5 MG Oral Tab Take 1 tablet (37.5 mg total) by mouth every morning before breakfast. 30 tablet 2    amoxicillin clavulanate 875-125 MG Oral Tab Take 1 tablet by mouth 2 (two) times daily. 14 tablet 0    SUMAtriptan (IMITREX) 25 MG Oral Tab Take 1 tablet (25 mg total) by mouth every 2 (two) hours as needed for Migraine. Use at onset; repeat once after 2 HRS-ONLY 2 IN 24 HR MAX 9 tablet 1    HYDROcodone-acetaminophen 5-325 MG Oral Tab Take 1 tablet by mouth every 6 (six) hours as needed for Pain. (Patient not taking: Reported on 1/16/2025) 20 tablet 0    methylPREDNISolone 4 MG Oral Tablet Therapy Pack Take per package insert (instructions). (Patient not taking: Reported on 1/16/2025) 21 tablet 0    HYDROcodone-acetaminophen 5-325 MG Oral Tab Take 1 tablet every 4 hours or 2 tablets every 6 hours as needed for postoperative pain (Patient not taking: Reported on 1/16/2025) 30 tablet 0    hydrOXYzine 25 MG Oral Tab  (Patient not taking: Reported on 11/6/2024)        Past Medical History:    Abdominal pain    Bad breath    Belching    Bleeding nose    Bloating    Blood in the stool    COVID-19    Decorative tattoo    Depression    Diarrhea, unspecified    Enlarged lymph node    Fatigue    Flatulence/gas pain/belching    Food intolerance     Frequent urination    Frequent UTI    Headache disorder    History of depression    History of mental disorder    Hyperhidrosis    Pt pt \"recently diagnosed\"    Indigestion    Irregular bowel habits    Menses painful    Nausea    Night sweats    Pain with bowel movements    PONV (postoperative nausea and vomiting)    has never had anesthesia, but is highly suseptable to Nausea and vomitting    Stress    Vomiting    Weight gain      Past Surgical History:   Procedure Laterality Date    Foot surgery Left 01/2024    Nail bed surgery done at the Podiatrist office with Local anesthesia      Family History   Problem Relation Age of Onset    Anxiety Father     Substance Abuse Father     Bipolar Disorder Father     Other (Other) Father         substance abuse disorder    Diabetes Mother     Cancer Mother     Anxiety Mother     Other (Other) Mother         substance abuse disorder    Diabetes Sister     Anxiety Sister     Suicide History Sister     Anxiety Brother     OCD Brother     Suicide History Brother       Social History     Socioeconomic History    Marital status: Single   Tobacco Use    Smoking status: Former     Current packs/day: 0.00     Types: Cigarettes    Smokeless tobacco: Never    Tobacco comments:     vaping.    Vaping Use    Vaping status: Former   Substance and Sexual Activity    Alcohol use: Yes     Alcohol/week: 5.0 standard drinks of alcohol     Types: 5 Standard drinks or equivalent per week     Comment: occ    Drug use: Not Currently    Sexual activity: Yes   Other Topics Concern    Caffeine Concern Yes     Comment: 1 cup daily     Exercise Yes     Comment: walking    Seat Belt Yes    Special Diet No    Stress Concern No    Weight Concern Yes           REVIEW OF SYSTEMS:   Today reviewed systens as documented below  GENERAL HEALTH: feels well otherwise  SKIN: Refer to exam below  RESPIRATORY: denies shortness of breath with exertion  CARDIOVASCULAR: denies chest pain on exertion  GI: denies abdominal  pain and denies heartburn  NEURO: denies headaches    EXAM:   LMP 12/01/2024 (Exact Date)   GENERAL: well developed, well nourished, in no apparent distress  EXTREMITIES:   1. Integument: The skin on her right hallux shows that there is a small lump on the lateral aspect where the surgery was performed may be scar tissue or just some swelling does not appear to be recurrent nail   2. Vascular: Has palpable pulse   3. Neurologic: Has intact sensorium   4. Musculoskeletal: Has good muscle strength and is ambulatory.    ASSESSMENT AND PLAN:   Diagnoses and all orders for this visit:    Status post nail surgery        Plan: At today's office visit I just told her we should monitor this for another month I will see her in a month see how it looks otherwise may have to refer to Dr. Ramirez for some type of revision or excision if it continues to be painful.    The patient indicates understanding of these issues and agrees to the plan.    Arnaud Urrutia DPM

## 2025-01-23 ENCOUNTER — HOSPITAL ENCOUNTER (OUTPATIENT)
Dept: GENERAL RADIOLOGY | Age: 30
Discharge: HOME | End: 2025-01-23
Attending: FAMILY MEDICINE
Payer: MEDICAID

## 2025-01-23 DIAGNOSIS — J18.9 COMMUNITY ACQUIRED PNEUMONIA, UNSPECIFIED LATERALITY: ICD-10-CM

## 2025-01-23 PROCEDURE — 71046 X-RAY EXAM CHEST 2 VIEWS: CPT | Performed by: FAMILY MEDICINE

## 2025-01-27 ENCOUNTER — OFFICE VISIT (OUTPATIENT)
Dept: FAMILY MEDICINE CLINIC | Facility: CLINIC | Age: 30
End: 2025-01-27
Payer: MEDICAID

## 2025-01-27 ENCOUNTER — TELEPHONE (OUTPATIENT)
Dept: OBGYN CLINIC | Facility: CLINIC | Age: 30
End: 2025-01-27

## 2025-01-27 ENCOUNTER — OFFICE VISIT (OUTPATIENT)
Dept: OCCUPATIONAL MEDICINE | Age: 30
End: 2025-01-27

## 2025-01-27 VITALS
OXYGEN SATURATION: 98 % | DIASTOLIC BLOOD PRESSURE: 70 MMHG | TEMPERATURE: 97 F | SYSTOLIC BLOOD PRESSURE: 112 MMHG | WEIGHT: 164.38 LBS | HEART RATE: 76 BPM | BODY MASS INDEX: 28 KG/M2 | RESPIRATION RATE: 18 BRPM

## 2025-01-27 DIAGNOSIS — Z3A.01 LESS THAN 8 WEEKS GESTATION OF PREGNANCY (HCC): ICD-10-CM

## 2025-01-27 DIAGNOSIS — O09.299 HISTORY OF MISCARRIAGE, CURRENTLY PREGNANT (HCC): ICD-10-CM

## 2025-01-27 DIAGNOSIS — N92.6 MISSED PERIOD: Primary | ICD-10-CM

## 2025-01-27 DIAGNOSIS — N91.2 AMENORRHEA: Primary | ICD-10-CM

## 2025-01-27 DIAGNOSIS — Z02.1 DRUG TESTING, PRE-EMPLOYMENT: Primary | ICD-10-CM

## 2025-01-27 LAB — B-HCG SERPL-ACNC: 1864.8 MIU/ML

## 2025-01-27 PROCEDURE — 84702 CHORIONIC GONADOTROPIN TEST: CPT | Performed by: FAMILY MEDICINE

## 2025-01-27 PROCEDURE — OH143 RAPID TEST DRUG KIT & COLLECTION 10 PANEL

## 2025-01-27 PROCEDURE — 84144 ASSAY OF PROGESTERONE: CPT | Performed by: FAMILY MEDICINE

## 2025-01-27 PROCEDURE — 99214 OFFICE O/P EST MOD 30 MIN: CPT | Performed by: FAMILY MEDICINE

## 2025-01-27 NOTE — PROGRESS NOTES
Lory Son is a 29 year old female.  Chief Complaint   Patient presents with    Pregnancy       HPI:   LMP: 12/2/24. Periods are irregular.   First day she tested positive was last night.   Her breasts were painful and she was cramping bad, but  not bleeding.   Some nausea, but not bad.   Stopped phentermine yesterday.     She is anxious since she had a miscarriage over the summer and abnormal uterine bleeding.   She does not want to go through all of that again. Has not scheduled with gyne yet.     ALLERGIES:  Allergies[1]      Current Outpatient Medications   Medication Sig Dispense Refill    acyclovir 5 % External Ointment Apply 1 Application topically every 3 (three) hours for 5 days. 15 g 0    Phentermine HCl 37.5 MG Oral Tab Take 1 tablet (37.5 mg total) by mouth every morning before breakfast. 30 tablet 2    amoxicillin clavulanate 875-125 MG Oral Tab Take 1 tablet by mouth 2 (two) times daily. 14 tablet 0    HYDROcodone-acetaminophen 5-325 MG Oral Tab Take 1 tablet every 4 hours or 2 tablets every 6 hours as needed for postoperative pain 30 tablet 0    HYDROcodone-acetaminophen 5-325 MG Oral Tab Take 1 tablet by mouth every 6 (six) hours as needed for Pain. (Patient not taking: Reported on 1/9/2025) 20 tablet 0    methylPREDNISolone 4 MG Oral Tablet Therapy Pack Take per package insert (instructions). (Patient not taking: Reported on 1/9/2025) 21 tablet 0    hydrOXYzine 25 MG Oral Tab  (Patient not taking: Reported on 1/27/2025)      SUMAtriptan (IMITREX) 25 MG Oral Tab Take 1 tablet (25 mg total) by mouth every 2 (two) hours as needed for Migraine. Use at onset; repeat once after 2 HRS-ONLY 2 IN 24 HR MAX (Patient not taking: Reported on 1/27/2025) 9 tablet 1      Past Medical History:    Abdominal pain    Bad breath    Belching    Bleeding nose    Bloating    Blood in the stool    COVID-19    Decorative tattoo    Depression    Diarrhea, unspecified    Enlarged lymph node    Fatigue    Flatulence/gas  pain/belching    Food intolerance    Frequent urination    Frequent UTI    Headache disorder    History of depression    History of mental disorder    Hyperhidrosis    Pt pt \"recently diagnosed\"    Indigestion    Irregular bowel habits    Menses painful    Nausea    Night sweats    Pain with bowel movements    PONV (postoperative nausea and vomiting)    has never had anesthesia, but is highly suseptable to Nausea and vomitting    Stress    Vomiting    Weight gain      Social History:  Social History     Socioeconomic History    Marital status: Single   Tobacco Use    Smoking status: Former     Current packs/day: 0.00     Types: Cigarettes    Smokeless tobacco: Never    Tobacco comments:     vaping.    Vaping Use    Vaping status: Former   Substance and Sexual Activity    Alcohol use: Yes     Alcohol/week: 5.0 standard drinks of alcohol     Types: 5 Standard drinks or equivalent per week     Comment: occ    Drug use: Not Currently    Sexual activity: Yes   Other Topics Concern    Caffeine Concern Yes     Comment: 1 cup daily     Exercise Yes     Comment: walking    Seat Belt Yes    Special Diet No    Stress Concern No    Weight Concern Yes     Social Drivers of Health     Financial Resource Strain: Not on File (4/10/2024)    Received from [employer]    Financial Resource Strain     Financial Resource Strain: 0   Food Insecurity: No Food Insecurity (1/9/2025)    NCSS - Food Insecurity     Worried About Running Out of Food in the Last Year: No     Ran Out of Food in the Last Year: No   Transportation Needs: No Transportation Needs (1/9/2025)    NCSS - Transportation     Lack of Transportation: No   Physical Activity: Not on File (4/10/2024)    Received from [employer]    Physical Activity     Physical Activity: 0   Stress: Not on File (4/10/2024)    Received from [employer]    Stress     Stress: 0   Social Connections: Not on File (9/16/2024)    Received from [employer]    Social Connections     Connectedness: 0   Housing Stability: Not  At Risk (1/9/2025)    NCSS - Housing/Utilities     Has Housing: Yes     Worried About Losing Housing: No     Unable to Get Utilities: No        BP Readings from Last 6 Encounters:   01/27/25 112/70   01/09/25 126/78   10/30/24 (!) 87/74   10/01/24 120/70   09/26/24 114/72   09/23/24 128/88       Wt Readings from Last 6 Encounters:   01/27/25 164 lb 6.4 oz (74.6 kg)   01/09/25 169 lb 9.6 oz (76.9 kg)   10/30/24 165 lb (74.8 kg)   10/01/24 164 lb 3.2 oz (74.5 kg)   09/26/24 162 lb 8 oz (73.7 kg)   09/23/24 168 lb (76.2 kg)       REVIEW OF SYSTEMS:   GENERAL HEALTH: feels well no complaints other than above   SKIN: denies any unusual skin lesions or rashes  RESPIRATORY: denies shortness of breath    CARDIOVASCULAR: denies chest pain    GI: denies abdominal pain and denies heartburn  NEURO: denies headaches    EXAM:   /70   Pulse 76   Temp 97.3 °F (36.3 °C) (Temporal)   Resp 18   Wt 164 lb 6.4 oz (74.6 kg)   LMP 12/01/2024 (Exact Date)   SpO2 98%   BMI 28.22 kg/m²  Body mass index is 28.22 kg/m².      GENERAL: well developed, well nourished,in no apparent distress  SKIN: no rashes,no suspicious lesions  HEENT: atraumatic, normocephalic,   NECK: supple,no adenopathy,   LUNGS: clear to auscultation  CARDIO: RRR without murmur  GI: good BS's,no masses, HSM or tenderness  EXTREMITIES: no cyanosis, clubbing or edema    ASSESSMENT AND PLAN:     Encounter Diagnoses   Name Primary?    Missed period Yes    Less than 8 weeks gestation of pregnancy ([facility])     History of miscarriage, currently pregnant ([facility])        Diagnoses and all orders for this visit:    Missed period  -     Cancel: Urine Preg Test  -     Cancel: Urine Preg Test  -     HCG, Beta Subunit (Quant Pregnancy Test) [E]; Future  -     Progesterone [E]; Future  -     VENIPUNCTURE  -     OBG Referral - Edward ([place])    Less than 8 weeks gestation of pregnancy ([facility])  -     [facility] Referral - Sean ([place])    History of miscarriage, currently pregnant ([facility])  -      OBG Referral - Edward ([place])    Refer to gyne asap.   Check labs as ordered.   Upreg positive today.   Start prenatal vitamin.     Orders Placed This Encounter   Procedures    HCG, Beta Subunit (Quant Pregnancy Test) [E]     Standing Status:   Future     Number of Occurrences:   1     Standing Expiration Date:   1/27/2026     Order Specific Question:   Release to patient     Answer:   Immediate    Progesterone [E]     Standing Status:   Future     Number of Occurrences:   1     Standing Expiration Date:   1/27/2026     Order Specific Question:   Release to patient     Answer:   Immediate    VENIPUNCTURE     Order Specific Question:   Release to patient     Answer:   Immediate               Meds & Refills for this Visit:  Requested Prescriptions      No prescriptions requested or ordered in this encounter             The patient indicates understanding of these issues and agrees to the plan.               [1]   Allergies  Allergen Reactions    Nsaids HIVES and SWELLING     Angioedema upper lip

## 2025-01-27 NOTE — TELEPHONE ENCOUNTER
Patient calling to initiate prenatal care  LMP 12/01/24  Patient is 7-8 weeks on 1/26/25  Confirmation of pregnancy appointment scheduled on   Future Appointments   Date Time Provider Department Center   2/7/2025  8:30 AM [facility] [address]   2/7/2025  9:00 AM Messi Shannon MD [facility] [address]   2/20/2025  4:30 PM Ely Ramirez, DO [facility] [place]   2/25/2025 10:00 AM Ann Randle MD [facility] [place]      Insurance [place] [employer]     Any history of ectopic pregnancy? NO  Any history of miscarriage? YES   Any medications that you are taking on a regular basis other than prenatal vitamins? NO  (if not taking prenatal vitamins, encourage patient to start taking.)  Any bleeding since the first day of last LMP and your positive pregnancy test? NO      Nephrology [facility]

## 2025-01-28 DIAGNOSIS — O09.299 HISTORY OF MISCARRIAGE, CURRENTLY PREGNANT (HCC): ICD-10-CM

## 2025-01-28 DIAGNOSIS — N92.6 MISSED PERIOD: Primary | ICD-10-CM

## 2025-01-28 LAB — PROGEST SERPL-MCNC: 12.34 NG/ML

## 2025-01-29 ENCOUNTER — LABORATORY ENCOUNTER (OUTPATIENT)
Dept: LAB | Age: 30
End: 2025-01-29
Attending: FAMILY MEDICINE
Payer: MEDICAID

## 2025-01-29 DIAGNOSIS — O09.299 HISTORY OF MISCARRIAGE, CURRENTLY PREGNANT (HCC): ICD-10-CM

## 2025-01-29 DIAGNOSIS — N92.6 MISSED PERIOD: ICD-10-CM

## 2025-01-29 LAB — B-HCG SERPL-ACNC: 3623.3 MIU/ML

## 2025-01-29 PROCEDURE — 84702 CHORIONIC GONADOTROPIN TEST: CPT

## 2025-01-29 PROCEDURE — 36415 COLL VENOUS BLD VENIPUNCTURE: CPT

## 2025-03-11 ENCOUNTER — OFFICE VISIT (OUTPATIENT)
Dept: FAMILY MEDICINE CLINIC | Facility: CLINIC | Age: 30
End: 2025-03-11
Payer: MEDICAID

## 2025-03-11 VITALS
DIASTOLIC BLOOD PRESSURE: 70 MMHG | SYSTOLIC BLOOD PRESSURE: 118 MMHG | HEART RATE: 87 BPM | OXYGEN SATURATION: 98 % | BODY MASS INDEX: 28 KG/M2 | RESPIRATION RATE: 20 BRPM | WEIGHT: 160.81 LBS | TEMPERATURE: 98 F

## 2025-03-11 DIAGNOSIS — Z98.890 HISTORY OF ELECTIVE ABORTION: ICD-10-CM

## 2025-03-11 DIAGNOSIS — A60.04 HERPES SIMPLEX VULVOVAGINITIS: Primary | ICD-10-CM

## 2025-03-11 PROCEDURE — 99214 OFFICE O/P EST MOD 30 MIN: CPT | Performed by: FAMILY MEDICINE

## 2025-03-11 RX ORDER — VALACYCLOVIR HYDROCHLORIDE 1 G/1
1000 TABLET, FILM COATED ORAL EVERY 12 HOURS SCHEDULED
Qty: 24 TABLET | Refills: 0 | Status: SHIPPED | OUTPATIENT
Start: 2025-03-11

## 2025-03-11 RX ORDER — ERGOCALCIFEROL 1.25 MG/1
50000 CAPSULE, LIQUID FILLED ORAL WEEKLY
COMMUNITY
Start: 2025-01-19

## 2025-03-11 NOTE — PROGRESS NOTES
Lory Son is a 29 year old female.  Chief Complaint   Patient presents with    Follow - Up       HPI:   After the pregnancy last month she decided she did not want to keep the pregnancy.   [facility] She had a D&C 25. That went well. She bled for about 4 days.   She wasn't sure what follow up she needed after that.   She took a pregnancy test 3/8 and it was negative.     She had an  in  and bled for 3 weeks.     Herpes: she had minor outbreak last week, only lasted 3 days.     Has had a little cough. Doesn't feel like she's been completely back to normal after her pneumonia a couple of months ago.   Today taking tylenol cold and flu, which helps. She gets neck pain and throat pain about once a month.     She is taking phentermine still, but stops fro a while at times. She feels good taking that.     ALLERGIES:  Allergies[1]      Current Outpatient Medications   Medication Sig Dispense Refill    ergocalciferol 1.25 MG (40217 UT) Oral Cap Take 1 capsule (50,000 Units total) by mouth once a week.      valACYclovir 1 G Oral Tab Take 1 tablet (1,000 mg total) by mouth every 12 (twelve) hours. X 2 days at the onset of each outbreak. This script is for more than one outbreak. 24 tablet 0    Phentermine HCl 37.5 MG Oral Tab Take 1 tablet (37.5 mg total) by mouth every morning before breakfast. 30 tablet 2    SUMAtriptan (IMITREX) 25 MG Oral Tab Take 1 tablet (25 mg total) by mouth every 2 (two) hours as needed for Migraine. Use at onset; repeat once after 2 HRS-ONLY 2 IN 24 HR MAX 9 tablet 1    acyclovir 5 % External Ointment Apply 1 Application topically every 3 (three) hours for 5 days. (Patient not taking: Reported on 3/11/2025) 15 g 0    HYDROcodone-acetaminophen 5-325 MG Oral Tab Take 1 tablet by mouth every 6 (six) hours as needed for Pain. (Patient not taking: Reported on 3/11/2025) 20 tablet 0    methylPREDNISolone 4 MG Oral Tablet Therapy Pack Take per package insert  (instructions). (Patient not taking: Reported on 3/11/2025) 21 tablet 0    amoxicillin clavulanate 875-125 MG Oral Tab Take 1 tablet by mouth 2 (two) times daily. (Patient not taking: Reported on 3/11/2025) 14 tablet 0    HYDROcodone-acetaminophen 5-325 MG Oral Tab Take 1 tablet every 4 hours or 2 tablets every 6 hours as needed for postoperative pain (Patient not taking: Reported on 3/11/2025) 30 tablet 0    hydrOXYzine 25 MG Oral Tab  (Patient not taking: Reported on 11/6/2024)        Past Medical History:    Abdominal pain    Bad breath    Belching    Bleeding nose    Bloating    Blood in the stool    COVID-19    Decorative tattoo    Depression    Diarrhea, unspecified    Enlarged lymph node    Fatigue    Flatulence/gas pain/belching    Food intolerance    Frequent urination    Frequent UTI    Headache disorder    History of depression    History of mental disorder    Hyperhidrosis    Pt pt \"recently diagnosed\"    Indigestion    Irregular bowel habits    Menses painful    Nausea    Night sweats    Pain with bowel movements    PONV (postoperative nausea and vomiting)    has never had anesthesia, but is highly suseptable to Nausea and vomitting    Stress    Vomiting    Weight gain      Social History:  Social History     Socioeconomic History    Marital status: Single   Tobacco Use    Smoking status: Former     Current packs/day: 0.00     Types: Cigarettes    Smokeless tobacco: Never    Tobacco comments:     vaping.    Vaping Use    Vaping status: Former   Substance and Sexual Activity    Alcohol use: Yes     Alcohol/week: 5.0 standard drinks of alcohol     Types: 5 Standard drinks or equivalent per week     Comment: occ    Drug use: Not Currently    Sexual activity: Yes   Other Topics Concern    Caffeine Concern Yes     Comment: 1 cup daily     Exercise Yes     Comment: walking    Seat Belt Yes    Special Diet No    Stress Concern No    Weight Concern Yes     Social Drivers of Health     Food Insecurity: No Food  Insecurity (2025)    NCSS - Food Insecurity     Worried About Running Out of Food in the Last Year: No     Ran Out of Food in the Last Year: No   Transportation Needs: No Transportation Needs (2025)    NCSS - Transportation     Lack of Transportation: No   Stress: Not on File (4/10/2024)    Received from [facility]    Stress     Stress: 0   Housing Stability: Not At Risk (2025)    NCSS - Housing/Utilities     Has Housing: Yes     Worried About Losing Housing: No     Unable to Get Utilities: No        BP Readings from Last 6 Encounters:   25 118/70   25 112/70   25 126/78   10/30/24 (!) 87/74   10/01/24 120/70   24 114/72       Wt Readings from Last 6 Encounters:   25 160 lb 12.8 oz (72.9 kg)   25 164 lb 6.4 oz (74.6 kg)   25 169 lb 9.6 oz (76.9 kg)   10/30/24 165 lb (74.8 kg)   10/01/24 164 lb 3.2 oz (74.5 kg)   24 162 lb 8 oz (73.7 kg)       REVIEW OF SYSTEMS:   GENERAL HEALTH: feels well no complaints other than above   SKIN: denies any unusual skin lesions or rashes  RESPIRATORY: denies shortness of breath with exertion  CARDIOVASCULAR: denies chest pain on exertion  GI: denies abdominal pain and denies heartburn  NEURO: denies headaches    EXAM:   /70   Pulse 87   Temp 97.7 °F (36.5 °C) (Temporal)   Resp 20   Wt 160 lb 12.8 oz (72.9 kg)   LMP 2024 (Exact Date)   SpO2 98%   BMI 27.60 kg/m²  Body mass index is 27.6 kg/m².      GENERAL: well developed, well nourished,in no apparent distress  SKIN: no rashes,no suspicious lesions  HEENT: atraumatic, normocephalic   NECK: supple,no adenopathy,   LUNGS: clear to auscultation  CARDIO: RRR without murmur  GI: good BS's,no masses, HSM or tenderness  EXTREMITIES: no cyanosis, clubbing or edema    ASSESSMENT AND PLAN:     Encounter Diagnoses   Name Primary?    Herpes simplex vulvovaginitis Yes    History of elective         Diagnoses and all orders for this visit:    Herpes simplex  vulvovaginitis  -     valACYclovir 1 G Oral Tab; Take 1 tablet (1,000 mg total) by mouth every 12 (twelve) hours. X 2 days at the onset of each outbreak. This script is for more than one outbreak.    History of elective     No more bleeding and recent upreg was negative.   Follow up with gyne for IUD for birth control.     No orders of the defined types were placed in this encounter.              Meds & Refills for this Visit:  Requested Prescriptions     Signed Prescriptions Disp Refills    valACYclovir 1 G Oral Tab 24 tablet 0     Sig: Take 1 tablet (1,000 mg total) by mouth every 12 (twelve) hours. X 2 days at the onset of each outbreak. This script is for more than one outbreak.             The patient indicates understanding of these issues and agrees to the plan.               [1]   Allergies  Allergen Reactions    Nsaids HIVES and SWELLING     Angioedema upper lip

## 2025-04-02 ENCOUNTER — OFFICE VISIT (OUTPATIENT)
Dept: PODIATRY CLINIC | Facility: CLINIC | Age: 30
End: 2025-04-02

## 2025-04-02 DIAGNOSIS — L60.0 ONYCHOCRYPTOSIS: Primary | ICD-10-CM

## 2025-04-02 DIAGNOSIS — M79.674 PAIN IN TOES OF BOTH FEET: ICD-10-CM

## 2025-04-02 DIAGNOSIS — M79.675 PAIN IN TOES OF BOTH FEET: ICD-10-CM

## 2025-04-02 PROCEDURE — 99213 OFFICE O/P EST LOW 20 MIN: CPT | Performed by: PODIATRIST

## 2025-04-06 NOTE — PROGRESS NOTES
Lory Son is a 29 year old female.   Chief Complaint   Patient presents with    Follow - Up     Nail /toe  pain and swelling with redness         HPI:   Patient returns to clinic complaining of recurrent nail pain she points to the edges on both her great toes where she has had phenol and alcohol techniques before, when her great onychoplasty's with revision and they are now becoming painful again.  At today's visit reviewed nurse's history as taken above, allergies medications and medical history as documented below.  All changes duly noted  Allergies: Nsaids   Current Outpatient Medications   Medication Sig Dispense Refill    ergocalciferol 1.25 MG (99899 UT) Oral Cap Take 1 capsule (50,000 Units total) by mouth once a week.      valACYclovir 1 G Oral Tab Take 1 tablet (1,000 mg total) by mouth every 12 (twelve) hours. X 2 days at the onset of each outbreak. This script is for more than one outbreak. 24 tablet 0    Phentermine HCl 37.5 MG Oral Tab Take 1 tablet (37.5 mg total) by mouth every morning before breakfast. 30 tablet 2    SUMAtriptan (IMITREX) 25 MG Oral Tab Take 1 tablet (25 mg total) by mouth every 2 (two) hours as needed for Migraine. Use at onset; repeat once after 2 HRS-ONLY 2 IN 24 HR MAX 9 tablet 1    acyclovir 5 % External Ointment Apply 1 Application topically every 3 (three) hours for 5 days. (Patient not taking: Reported on 4/2/2025) 15 g 0    HYDROcodone-acetaminophen 5-325 MG Oral Tab Take 1 tablet by mouth every 6 (six) hours as needed for Pain. (Patient not taking: Reported on 1/9/2025) 20 tablet 0    methylPREDNISolone 4 MG Oral Tablet Therapy Pack Take per package insert (instructions). (Patient not taking: Reported on 1/9/2025) 21 tablet 0    amoxicillin clavulanate 875-125 MG Oral Tab Take 1 tablet by mouth 2 (two) times daily. (Patient not taking: Reported on 4/2/2025) 14 tablet 0    HYDROcodone-acetaminophen 5-325 MG Oral Tab Take 1 tablet every 4 hours or 2 tablets every 6  hours as needed for postoperative pain (Patient not taking: Reported on 4/2/2025) 30 tablet 0    hydrOXYzine 25 MG Oral Tab  (Patient not taking: Reported on 4/2/2025)        Past Medical History:    Abdominal pain    Bad breath    Belching    Bleeding nose    Bloating    Blood in the stool    COVID-19    Decorative tattoo    Depression    Diarrhea, unspecified    Enlarged lymph node    Fatigue    Flatulence/gas pain/belching    Food intolerance    Frequent urination    Frequent UTI    Headache disorder    History of depression    History of mental disorder    Hyperhidrosis    Pt pt \"recently diagnosed\"    Indigestion    Irregular bowel habits    Menses painful    Nausea    Night sweats    Pain with bowel movements    PONV (postoperative nausea and vomiting)    has never had anesthesia, but is highly suseptable to Nausea and vomitting    Stress    Vomiting    Weight gain      Past Surgical History:   Procedure Laterality Date    Foot surgery Left 01/2024    Nail bed surgery done at the Podiatrist office with Local anesthesia      Family History   Problem Relation Age of Onset    Anxiety Father     Substance Abuse Father     Bipolar Disorder Father     Other (Other) Father         substance abuse disorder    Diabetes Mother     Cancer Mother     Anxiety Mother     Other (Other) Mother         substance abuse disorder    Diabetes Sister     Anxiety Sister     Suicide History Sister     Anxiety Brother     OCD Brother     Suicide History Brother       Social History     Socioeconomic History    Marital status: Single   Tobacco Use    Smoking status: Former     Current packs/day: 0.00     Types: Cigarettes    Smokeless tobacco: Never    Tobacco comments:     vaping.    Vaping Use    Vaping status: Former   Substance and Sexual Activity    Alcohol use: Yes     Alcohol/week: 5.0 standard drinks of alcohol     Types: 5 Standard drinks or equivalent per week     Comment: occ    Drug use: Not Currently    Sexual activity:  Yes   Other Topics Concern    Caffeine Concern Yes     Comment: 1 cup daily     Exercise Yes     Comment: walking    Seat Belt Yes    Special Diet No    Stress Concern No    Weight Concern Yes           REVIEW OF SYSTEMS:   Today reviewed systens as documented below  GENERAL HEALTH: feels well otherwise  SKIN: Refer to exam below  RESPIRATORY: denies shortness of breath with exertion  CARDIOVASCULAR: denies chest pain on exertion  GI: denies abdominal pain and denies heartburn  NEURO: denies headaches    EXAM:   LMP 12/01/2024 (Exact Date)   GENERAL: well developed, well nourished, in no apparent distress  EXTREMITIES:   1. Integument: The skin on her feet is warm but very moist she has hyperhidrosis the medial lateral nail borders of the hallux especially the medial nail border of the left lateral nail border the right do show some signs of inflammation but there is no drainage   2. Vascular: Patient has palpable pulses   3. Neurologic: Has intact sense   4. Musculoskeletal: Patient has pain on palpation of the medial lateral nail edges of the hallux on both feet.    ASSESSMENT AND PLAN:   Diagnoses and all orders for this visit:    Onychocryptosis    Pain in toes of both feet        Plan: The patient wants to have the nails totally removed I think the only way to do this is to Donald  nail procedure.  Referred to Dr. Ramirez.    The patient indicates understanding of these issues and agrees to the plan.    Arnaud Urrutia DPM

## 2025-04-10 DIAGNOSIS — E66.811 CLASS 1 OBESITY WITH BODY MASS INDEX (BMI) OF 31.0 TO 31.9 IN ADULT, UNSPECIFIED OBESITY TYPE, UNSPECIFIED WHETHER SERIOUS COMORBIDITY PRESENT: ICD-10-CM

## 2025-04-10 RX ORDER — PHENTERMINE HYDROCHLORIDE 37.5 MG/1
37.5 TABLET ORAL
Qty: 30 TABLET | Refills: 0 | Status: SHIPPED | OUTPATIENT
Start: 2025-04-10

## 2025-04-10 NOTE — TELEPHONE ENCOUNTER
Controlled Substance Medication Gzbuuv33/10/2025 12:58 PM    This medication is a controlled substance - forward to provider to refill    Medication is active on med list          Last refill:   Phentermine HCl 37.5 MG Oral Tab 30 tablet 2 12/2/2024     Last Visit: 3/11/25    Next Visit: No future appointments.      Forward to Dr. Ramirez please advise on refills. Thanks.

## 2025-04-13 ENCOUNTER — PATIENT MESSAGE (OUTPATIENT)
Dept: FAMILY MEDICINE CLINIC | Facility: CLINIC | Age: 30
End: 2025-04-13

## 2025-04-13 DIAGNOSIS — R51.9 CHRONIC NONINTRACTABLE HEADACHE, UNSPECIFIED HEADACHE TYPE: Primary | ICD-10-CM

## 2025-04-13 DIAGNOSIS — G89.29 CHRONIC NONINTRACTABLE HEADACHE, UNSPECIFIED HEADACHE TYPE: Primary | ICD-10-CM

## 2025-04-14 NOTE — TELEPHONE ENCOUNTER
Please advise. Thank you.  Do you want patient to schedule follow up with you or refer to Neurology?

## 2025-04-29 ENCOUNTER — OFFICE VISIT (OUTPATIENT)
Dept: NEUROLOGY | Facility: CLINIC | Age: 30
End: 2025-04-29
Payer: COMMERCIAL

## 2025-04-29 ENCOUNTER — TELEPHONE (OUTPATIENT)
Dept: NEUROLOGY | Facility: CLINIC | Age: 30
End: 2025-04-29

## 2025-04-29 DIAGNOSIS — G43.109 MIGRAINE WITH AURA AND WITHOUT STATUS MIGRAINOSUS, NOT INTRACTABLE: Primary | ICD-10-CM

## 2025-04-29 PROCEDURE — 99204 OFFICE O/P NEW MOD 45 MIN: CPT | Performed by: OTHER

## 2025-04-29 RX ORDER — METOCLOPRAMIDE 10 MG/1
10 TABLET ORAL
COMMUNITY
Start: 2025-04-07

## 2025-04-29 RX ORDER — SUMATRIPTAN 50 MG/1
TABLET, FILM COATED ORAL
Qty: 10 TABLET | Refills: 2 | Status: SHIPPED | OUTPATIENT
Start: 2025-04-29

## 2025-04-29 RX ORDER — ONDANSETRON 4 MG/1
4 TABLET, ORALLY DISINTEGRATING ORAL
COMMUNITY
Start: 2025-04-07

## 2025-04-29 NOTE — PROGRESS NOTES
HPI:    Patient ID: Lory Son is a 29 year old female.    HPI  Beatriz Son is a 29-year-old female with history for migraines who presented for evaluation of headaches.  She reports history for longstanding headaches usually gets 2-3 migraines a year, but recently had an severe episode of headache with visual changes which she never had it before.  Stated the episode started with loss of vision very blurry and dark couldn't see for 20 minutes then had a spotty vision for almost a day associated with diffuse headache.  She went to [facility], had CT head/CTA head and neck and CT venogram which was all negative. States she continues to have headaches but as intense as before  Denies any known triggers. Not on any oral contraceptives. On Phentermine on weight loss for sometime        HISTORY:  Past Medical History[1]   Past Surgical History[2]   Family History[3]   Short Social Hx on File[4]     Review of Systems   Constitutional: Negative.    HENT: Negative.     Eyes:  Positive for visual disturbance.   Respiratory: Negative.     Cardiovascular: Negative.    Gastrointestinal: Negative.    Endocrine: Negative.    Genitourinary: Negative.    Musculoskeletal: Negative.    Skin: Negative.    Allergic/Immunologic: Negative.    Neurological:  Positive for headaches.   Hematological: Negative.    Psychiatric/Behavioral: Negative.     All other systems reviewed and are negative.         Current Medications[5]  Allergies:Allergies[6]  PHYSICAL EXAM:   Physical Exam  Last menstrual period 12/01/2024, not currently breastfeeding.    General Appearance: Well nourished, well developed, no apparent distress.   HEENT: Normocephalic and atraumatic.  Cardiovascular: Normal rate, regular rhythm and normal heart sounds.    Pulmonary/Chest: Effort normal and breath sounds normal.   Abdominal: Soft. Bowel sounds are normal.   Skin: dry, clean and intact  Ext: peripheral pulses present  Psych: normal mood and affect    Neurological:   Patient is awake, alert and oriented to person, place and time   Normal memory, attention/concentration, speech and language.    Cranial Nerves: II: Visual acuity: normal  II: Visual fields: normal  III: Pupils: equal, round, reactive to light  III,IV,VI: Extra Ocular Movements: intact  V: Facial sensation: intact  VII: Facial strength: intact  VIII: Hearing: intact  IX: Palate: intact  XI: Shoulder shrug: intact  XII: Tongue movement: normal    Motor: Normal tone. Strength is  5 out of 5 in all extremities bilaterally.  DTR: present    Sensory: Sensory examination is normal to light touch and pinprick     Coordination: Finger-to-nose normal bilaterally without evidence of dysmetria.    Gait: normal casual gait          TESTS/IMAGING:         [facility]    CTA head and neck and CT venogram with and without contrast: 4/7/25    1. No acute bleed. No acute infarct. No abnormal focus of parenchymal enhancement.     2. No obvious hemodynamically significant stenosis/vascular occlusion. No obvious saccular aneurysm.     3. Please note that the aortic arch/proximal great vessels/proximal common carotid arteries/proximal vertebral arteries not imaged on this study. If necessary consider repeat CTA neck to further assess as clinically warranted.           ASSESSMENT/PLAN:       ICD-10-CM    1. Migraine with aura and without status migrainosus, not intractable  G43.109         Patient is a 27-year-old female with history for migraines presenting with increased headaches and episodes of prolonged visual auras    Advise to start preventive agents. Tried? Topamax for weight loss    She would benefit from new CGRP agents, Aimovig or Ajovy injections  Qulipta is another option. Patient would like proceed with Aimovig  Side effect explained    Follow up after the above test is completed    Thank you for allowing us to participate in your patient's care.         Ramiro Lincoln MD   [facility] [place]      This note was  prepared using Dragon Medical voice recognition dictation software. As a result errors may occur. When identified these errors have been corrected. While every attempt is made to correct errors during dictation discrepancies may still exist         Meds This Visit:  Requested Prescriptions      No prescriptions requested or ordered in this encounter       Imaging & Referrals:  None     ID#[id number]         [1]   Past Medical History:   Abdominal pain    Bad breath    Belching    Bleeding nose    Bloating    Blood in the stool    COVID-19    Decorative tattoo    Depression    Diarrhea, unspecified    Enlarged lymph node    Fatigue    Flatulence/gas pain/belching    Food intolerance    Frequent urination    Frequent UTI    Headache disorder    History of depression    History of mental disorder    Hyperhidrosis    Pt pt \"recently diagnosed\"    Indigestion    Irregular bowel habits    Menses painful    Nausea    Night sweats    Pain with bowel movements    PONV (postoperative nausea and vomiting)    has never had anesthesia, but is highly suseptable to Nausea and vomitting    Stress    Vomiting    Weight gain   [2]   Past Surgical History:  Procedure Laterality Date    Foot surgery Left 01/2024    Nail bed surgery done at the Podiatrist office with Local anesthesia   [3]   Family History  Problem Relation Age of Onset    Anxiety Father     Substance Abuse Father     Bipolar Disorder Father     Other (Other) Father         substance abuse disorder    Diabetes Mother     Cancer Mother     Anxiety Mother     Other (Other) Mother         substance abuse disorder    Diabetes Sister     Anxiety Sister     Suicide History Sister     Anxiety Brother     OCD Brother     Suicide History Brother    [4]   Social History  Socioeconomic History    Marital status: Single   Tobacco Use    Smoking status: Former     Current packs/day: 0.00     Types: Cigarettes    Smokeless tobacco: Never    Tobacco comments:     vaping.    Vaping Use     Vaping status: Former   Substance and Sexual Activity    Alcohol use: Yes     Alcohol/week: 5.0 standard drinks of alcohol     Types: 5 Standard drinks or equivalent per week     Comment: occ    Drug use: Not Currently    Sexual activity: Yes   Other Topics Concern    Caffeine Concern Yes     Comment: 1 cup daily     Exercise Yes     Comment: walking    Seat Belt Yes    Special Diet No    Stress Concern No    Weight Concern Yes     Social Drivers of Health     Food Insecurity: No Food Insecurity (1/9/2025)    NCSS - Food Insecurity     Worried About Running Out of Food in the Last Year: No     Ran Out of Food in the Last Year: No   Transportation Needs: No Transportation Needs (1/9/2025)    NCSS - Transportation     Lack of Transportation: No   Stress: Not on File (4/10/2024)    Received from [facility]    Stress     Stress: 0   Housing Stability: Not At Risk (1/9/2025)    NCSS - Housing/Utilities     Has Housing: Yes     Worried About Losing Housing: No     Unable to Get Utilities: No   [5]   Current Outpatient Medications   Medication Sig Dispense Refill    metoclopramide 10 MG Oral Tab Take 1 tablet (10 mg total) by mouth.      ondansetron 4 MG Oral Tablet Dispersible Take 1 tablet (4 mg total) by mouth.      PHENTERMINE HCL 37.5 MG Oral Tab TAKE 1 TABLET(37.5 MG) BY MOUTH EVERY MORNING BEFORE BREAKFAST 30 tablet 0    valACYclovir 1 G Oral Tab Take 1 tablet (1,000 mg total) by mouth every 12 (twelve) hours. X 2 days at the onset of each outbreak. This script is for more than one outbreak. 24 tablet 0    SUMAtriptan (IMITREX) 25 MG Oral Tab Take 1 tablet (25 mg total) by mouth every 2 (two) hours as needed for Migraine. Use at onset; repeat once after 2 HRS-ONLY 2 IN 24 HR MAX 9 tablet 1    ergocalciferol 1.25 MG (59319 UT) Oral Cap Take 1 capsule (50,000 Units total) by mouth once a week. (Patient not taking: Reported on 4/29/2025)      acyclovir 5 % External Ointment Apply 1 Application topically every 3 (three)  hours for 5 days. (Patient not taking: Reported on 4/2/2025) 15 g 0    HYDROcodone-acetaminophen 5-325 MG Oral Tab Take 1 tablet by mouth every 6 (six) hours as needed for Pain. (Patient not taking: Reported on 1/9/2025) 20 tablet 0    methylPREDNISolone 4 MG Oral Tablet Therapy Pack Take per package insert (instructions). (Patient not taking: Reported on 1/9/2025) 21 tablet 0    amoxicillin clavulanate 875-125 MG Oral Tab Take 1 tablet by mouth 2 (two) times daily. (Patient not taking: Reported on 4/2/2025) 14 tablet 0    HYDROcodone-acetaminophen 5-325 MG Oral Tab Take 1 tablet every 4 hours or 2 tablets every 6 hours as needed for postoperative pain (Patient not taking: Reported on 4/2/2025) 30 tablet 0    hydrOXYzine 25 MG Oral Tab  (Patient not taking: Reported on 4/2/2025)     [6]   Allergies  Allergen Reactions    Nsaids HIVES and SWELLING     Angioedema upper lip

## 2025-05-06 ENCOUNTER — OFFICE VISIT (OUTPATIENT)
Facility: LOCATION | Age: 30
End: 2025-05-06

## 2025-05-06 VITALS — WEIGHT: 157.81 LBS | HEIGHT: 64 IN | BODY MASS INDEX: 26.94 KG/M2

## 2025-05-06 DIAGNOSIS — R09.81 NASAL CONGESTION: ICD-10-CM

## 2025-05-06 DIAGNOSIS — J34.89 NASAL OBSTRUCTION: ICD-10-CM

## 2025-05-06 DIAGNOSIS — J32.9 CHRONIC SINUSITIS, UNSPECIFIED LOCATION: ICD-10-CM

## 2025-05-06 DIAGNOSIS — J01.91 ACUTE RECURRENT SINUSITIS, UNSPECIFIED LOCATION: Primary | ICD-10-CM

## 2025-05-06 DIAGNOSIS — J34.3 HYPERTROPHY OF NASAL TURBINATES: ICD-10-CM

## 2025-05-06 DIAGNOSIS — J34.2 DEVIATED NASAL SEPTUM: ICD-10-CM

## 2025-05-06 PROCEDURE — 31231 NASAL ENDOSCOPY DX: CPT | Performed by: STUDENT IN AN ORGANIZED HEALTH CARE EDUCATION/TRAINING PROGRAM

## 2025-05-06 PROCEDURE — 99203 OFFICE O/P NEW LOW 30 MIN: CPT | Performed by: STUDENT IN AN ORGANIZED HEALTH CARE EDUCATION/TRAINING PROGRAM

## 2025-05-06 RX ORDER — AZELASTINE 1 MG/ML
1 SPRAY, METERED NASAL 2 TIMES DAILY
Qty: 30 ML | Refills: 3 | Status: SHIPPED | OUTPATIENT
Start: 2025-05-06

## 2025-05-07 NOTE — PROGRESS NOTES
[facility]  OTOLARYNGOLOGY - HEAD & NECK SURGERY    5/6/2025     Reason for Consultation:     Chief Complaint   Patient presents with    New Patient    Sinus Problem     Patient here for recurrent sinus infection evaluation, was advised to see ENT.         History of Present Illness:   The patient is a very pleasant 29-year-old female who comes to see me today for problems with her sinuses.  She originally was being worked up and treated for her migraine headaches by her neurologist and was recently sent to the ER for evaluation due to a severe migraine.  In the ER she had a CT angiogram which did reveal some mucosal thickening of her ethmoidal sinuses.  She does have a history of recurring sinus infections and states that she gets approximately 6 infections per year requiring treatment.  She has been on Flonase regularly, and does take an oral antihistamine.  She has not had any dedicated CT imaging.  No history of any nasal or sinus surgery.  At times she feels that she can have significant nasal congestion with blockage of both of her nasal cavities.  She has done nasal rinses in the past and usually does a few per week.  Has not tried any budesonide rinses.    Past Medical History  Past Medical History[1]    Past Surgical History  Past Surgical History[2]    Family History  Family History[3]    Social History  Pediatric History   Patient Parents    Parrish Son (Mother)    Kory Tompkins (Father)     Other Topics Concern    Caffeine Concern Yes     Comment: 1 cup daily     Exercise Yes     Comment: walking    Seat Belt Yes    Special Diet No    Stress Concern No    Weight Concern Yes     Service Not Asked    Blood Transfusions Not Asked    Occupational Exposure Not Asked    Hobby Hazards Not Asked    Sleep Concern Not Asked    Back Care Not Asked    Bike Helmet Not Asked    Self-Exams Not Asked   Social History Narrative    Not on file           Current Medications:  Current  Medications[4]    Allergies  Allergies[5]    Review of Systems:   A comprehensive 10 point review of systems was completed.  Pertinent positives and negatives noted in the the HPI.    Physical Exam:   Height 5' 4\" (1.626 m), weight 157 lb 12.8 oz (71.6 kg), last menstrual period 12/01/2024, not currently breastfeeding.    GENERAL: No acute distress, Comfortable appearing  FACE: HB 1/6, Normal Animation  HEAD: Normocephalic  EYES: EOMI, pupils equil  EARS: Bilateral Auricles Symmetric, bilateral tympanic membranes appear normal  NOSE: Nares patent bilaterally  ORAL CAVITY: Tongue mobile, Oropharynx clear, Floor of mouth clear, Posterior oropharynx normal  NECK: No palpable lymphadenopathy, thyroid not palpable, nontender    PROCEDURE: BILATERAL RIGID NASAL ENDOSCOPY  Bilateral rigid nasal endoscopy (28523) was performed. Verbal consent was obtained from the patient to proceed with rigid nasal endoscopy.  A rigid 4mm 30 degree nasal endoscope was used to examine both nasal cavities. The inferior meatus, inferior turbinate, nasopharynx, middle meatus, middle turbinate, superior meatus, superior turbinate, and sphenoethmoidal recess were examined bilaterally and deemed to be normal, with any exceptions as noted below. At the completion of the procedure the endoscope was removed. The patient tolerated the procedure well. There were no complications.    Findings: The bilateral inferior turbinates were boggy and enlarged. The Septum was deviated to the right. The middle meatus was edematous on the right and patent on the left without any purulent drainage noted on either side. There were no obvious masses or polyps noted.      Results:     Laboratory Data:  Lab Results   Component Value Date    WBC 5.7 10/26/2023    HGB 14.2 10/26/2023    HCT 43.8 10/26/2023    .0 10/26/2023    CREATSERUM 0.90 10/26/2023    BUN 9 10/26/2023     10/26/2023    K 4.1 10/26/2023     10/26/2023    CO2 25.0 10/26/2023    GLU  89 10/26/2023    CA 9.2 10/26/2023    ALB 3.9 10/26/2023    ALKPHO 72 10/26/2023    TP 8.0 10/26/2023    AST 20 10/26/2023    ALT 31 10/26/2023    T4F 1.0 03/15/2022    TSH 1.280 10/26/2023         Imaging:  No results found.    Results         Impression:       ICD-10-CM    1. Acute recurrent sinusitis, unspecified location  J01.91       2. Chronic sinusitis, unspecified location  J32.9       3. Deviated nasal septum  J34.2       4. Hypertrophy of nasal turbinates  J34.3       5. Nasal congestion  R09.81       6. Nasal obstruction  J34.89            Recommendations:  I would like the patient to try Flonase and azelastine in combination.  She will continue her nasal saline rinses.  I will have her obtain a dedicated CT sinus and she will return to see me if she is not improved.  Based on the CT sinus will determine next steps.    Thank you for allowing me to participate in the care of your patient.    Jack Huerta, DO   Otolaryngology/Rhinology, Sinus, and Endoscopic Skull Base Surgery  [address] [place] [id number]  Phone [phone]  Fax [phone]  5/6/2025  11:01 PM  5/6/2025          [1]   Past Medical History:   Abdominal pain    Bad breath    Belching    Bleeding nose    Bloating    Blood in the stool    COVID-19    Decorative tattoo    Depression    Diarrhea, unspecified    Enlarged lymph node    Fatigue    Flatulence/gas pain/belching    Food intolerance    Frequent urination    Frequent UTI    Headache disorder    History of depression    History of mental disorder    Hyperhidrosis    Pt pt \"recently diagnosed\"    Indigestion    Irregular bowel habits    Menses painful    Nausea    Night sweats    Pain with bowel movements    PONV (postoperative nausea and vomiting)    has never had anesthesia, but is highly suseptable to Nausea and vomitting    Stress    Vomiting    Weight gain   [2]   Past Surgical History:  Procedure Laterality Date    Foot  surgery Left 01/2024    Nail bed surgery done at the Podiatrist office with Local anesthesia   [3]   Family History  Problem Relation Age of Onset    Anxiety Father     Substance Abuse Father     Bipolar Disorder Father     Other (Other) Father         substance abuse disorder    Diabetes Mother     Cancer Mother     Anxiety Mother     Other (Other) Mother         substance abuse disorder    Diabetes Sister     Anxiety Sister     Suicide History Sister     Anxiety Brother     OCD Brother     Suicide History Brother    [4]   Current Outpatient Medications   Medication Sig Dispense Refill    azelastine 0.1 % Nasal Solution 1 spray by Nasal route 2 (two) times daily. 30 mL 3    PHENTERMINE HCL 37.5 MG Oral Tab TAKE 1 TABLET(37.5 MG) BY MOUTH EVERY MORNING BEFORE BREAKFAST 30 tablet 0    metoclopramide 10 MG Oral Tab Take 1 tablet (10 mg total) by mouth. (Patient not taking: Reported on 5/6/2025)      ondansetron 4 MG Oral Tablet Dispersible Take 1 tablet (4 mg total) by mouth. (Patient not taking: Reported on 5/6/2025)      erenumab-aooe 70 MG/ML SC Inject 1 mL (70 mg total) into the skin every 30 (thirty) days. (Patient not taking: Reported on 5/6/2025) 1 mL 2    SUMAtriptan 50 MG Oral Tab Use at onset; repeat once after 2 hours-ONLY 2 IN 24 HR MAX. This is a 30 day supply. (Patient not taking: Reported on 5/6/2025) 10 tablet 2    valACYclovir 1 G Oral Tab Take 1 tablet (1,000 mg total) by mouth every 12 (twelve) hours. X 2 days at the onset of each outbreak. This script is for more than one outbreak. (Patient not taking: Reported on 5/6/2025) 24 tablet 0    acyclovir 5 % External Ointment Apply 1 Application topically every 3 (three) hours for 5 days. (Patient not taking: Reported on 4/2/2025) 15 g 0    HYDROcodone-acetaminophen 5-325 MG Oral Tab Take 1 tablet by mouth every 6 (six) hours as needed for Pain. (Patient not taking: Reported on 1/9/2025) 20 tablet 0    amoxicillin clavulanate 875-125 MG Oral Tab Take 1  tablet by mouth 2 (two) times daily. (Patient not taking: Reported on 4/2/2025) 14 tablet 0    hydrOXYzine 25 MG Oral Tab  (Patient not taking: Reported on 4/2/2025)      SUMAtriptan (IMITREX) 25 MG Oral Tab Take 1 tablet (25 mg total) by mouth every 2 (two) hours as needed for Migraine. Use at onset; repeat once after 2 HRS-ONLY 2 IN 24 HR MAX 9 tablet 1   [5]   Allergies  Allergen Reactions    Nsaids HIVES and SWELLING     Angioedema upper lip

## 2025-05-16 ENCOUNTER — PATIENT MESSAGE (OUTPATIENT)
Facility: LOCATION | Age: 30
End: 2025-05-16

## 2025-05-16 DIAGNOSIS — E66.811 CLASS 1 OBESITY WITH BODY MASS INDEX (BMI) OF 31.0 TO 31.9 IN ADULT, UNSPECIFIED OBESITY TYPE, UNSPECIFIED WHETHER SERIOUS COMORBIDITY PRESENT: ICD-10-CM

## 2025-05-16 DIAGNOSIS — J01.91 ACUTE RECURRENT SINUSITIS, UNSPECIFIED LOCATION: Primary | ICD-10-CM

## 2025-05-16 RX ORDER — PHENTERMINE HYDROCHLORIDE 37.5 MG/1
37.5 TABLET ORAL
Qty: 30 TABLET | Refills: 0 | Status: SHIPPED | OUTPATIENT
Start: 2025-05-16

## 2025-05-16 NOTE — TELEPHONE ENCOUNTER
Dr. Huerta, it looks like the CT order wasn't entered on 5/6/25, please see pended order and edit/sign.

## 2025-05-16 NOTE — TELEPHONE ENCOUNTER
Resubmitted through [facility], states the questions were submitted after the deadline    Nursing will await outcome

## 2025-05-30 ENCOUNTER — TELEPHONE (OUTPATIENT)
Dept: ADMINISTRATIVE | Age: 30
End: 2025-05-30

## 2025-05-30 NOTE — TELEPHONE ENCOUNTER
Hello,    Please see below information required by [facility].      This request requires a medical necessity reason for the selected rendering site. You may  change the rendering provider to a preferred site or provide a medical necessity reason on  [employer] using Case ID: [id number] or the selected site will may be denied. This  member's medical plan offers less out of pocket cost when using a Designated Diagnostic  Imaging provider.      Please fax medical necessity to [phone] by 6.2    If you would like to engage in a physician to physician conversation, please contact [facility] at  [phone].      Thank you    Felisha ORTIZ  Patient Referral Representative  Prior Authorizations & Referrals  [facility]

## 2025-06-05 NOTE — TELEPHONE ENCOUNTER
please see note below, insurance requires pt to have CT sinus scan done at external facility  unless medically necessary to have at [facility] (will you use imaging for possible surgery). ?  If so letter would need to be generated.

## 2025-06-06 NOTE — TELEPHONE ENCOUNTER
Left message to call back - pt needs to call insurance [phone]  case #[id number], pt to pick external facility for imaging

## 2025-06-17 ENCOUNTER — OFFICE VISIT (OUTPATIENT)
Dept: PODIATRY CLINIC | Facility: CLINIC | Age: 30
End: 2025-06-17

## 2025-06-17 DIAGNOSIS — M79.674 PAIN IN TOES OF BOTH FEET: ICD-10-CM

## 2025-06-17 DIAGNOSIS — M79.675 PAIN IN TOES OF BOTH FEET: ICD-10-CM

## 2025-06-17 DIAGNOSIS — L60.0 ONYCHOCRYPTOSIS: Primary | ICD-10-CM

## 2025-06-17 DIAGNOSIS — Z98.890 STATUS POST NAIL SURGERY: ICD-10-CM

## 2025-06-17 PROCEDURE — 99213 OFFICE O/P EST LOW 20 MIN: CPT | Performed by: STUDENT IN AN ORGANIZED HEALTH CARE EDUCATION/TRAINING PROGRAM

## 2025-06-21 DIAGNOSIS — E66.811 CLASS 1 OBESITY WITH BODY MASS INDEX (BMI) OF 31.0 TO 31.9 IN ADULT, UNSPECIFIED OBESITY TYPE, UNSPECIFIED WHETHER SERIOUS COMORBIDITY PRESENT: ICD-10-CM

## 2025-06-21 NOTE — TELEPHONE ENCOUNTER
Routing to provider per protocol.   Phentermine HCl 37.5 MG Oral Tab   Last refilled on 5/16/25 for #30  with 0 rf.   Last labs 5/11/25.   Last seen on 3/11/25.     No future appointments.       Thank you.

## 2025-06-23 ENCOUNTER — TELEPHONE (OUTPATIENT)
Dept: PODIATRY CLINIC | Facility: CLINIC | Age: 30
End: 2025-06-23

## 2025-06-23 DIAGNOSIS — L03.032 PARONYCHIA OF TOE OF LEFT FOOT: ICD-10-CM

## 2025-06-23 DIAGNOSIS — L03.031 PARONYCHIA OF TOE OF RIGHT FOOT: ICD-10-CM

## 2025-06-23 DIAGNOSIS — L60.0 ONYCHOCRYPTOSIS: Primary | ICD-10-CM

## 2025-06-23 RX ORDER — PHENTERMINE HYDROCHLORIDE 37.5 MG/1
37.5 TABLET ORAL
Qty: 30 TABLET | Refills: 0 | Status: SHIPPED | OUTPATIENT
Start: 2025-06-23

## 2025-06-23 NOTE — TELEPHONE ENCOUNTER
Procedure:   Surgical excision of nail matrix of right and left great toes.  CPT code: 24089  Length of Surgery: 45 minutes  Any Instruments: podiatry tray  Call patient: ASAP  Anesthesia: MAC  Location: [facility] or [facility]  Assistance: none  Pacemaker: No  Anticoagulants: No  Nickel Allergy: No  Latex Allergy: No  Diagnosis/ICD Code:     ICD-10-CM    1. Onychocryptosis  L60.0       2. Paronychia of toe of left foot  L03.032       3. Paronychia of toe of right foot  L03.031            Interested in procedure this fall

## 2025-06-23 NOTE — PROGRESS NOTES
[facility] Podiatry  Progress Note    Lory Son is a 29 year old female.   Chief Complaint   Patient presents with    Follow - Up     Bilateral feet- constant pain- rates pain 5/10- drainage on the right hallux          HPI:      Patient is a pleasant 28 y/o female who PTC for evaluation of chronically ingrown nails to her right and left hallux nails. She continues to develop regrowth/pain to hallux nail sites despite numerous P&A procedures as well as winograd procedure most recently with Dr. Urrutia. She is hoping to have hallux nails permanently removed at this time as they continue to cause significant pain. She relates that her pain is constant and rates it at a 5/10. No other complaints are mentioned.       Allergies: Nsaids   Current Medications[1]   Past Medical History[2]   Past Surgical History[3]   Family History[4]   Social Hx on file[5]        REVIEW OF SYSTEMS:     No n/v/f/c.      EXAM:   LMP 12/01/2024 (Exact Date)   GENERAL: well developed, well nourished, in no apparent distress  EXTREMITIES:       1. Integument: Normal skin temperature and turgor. Hallux nail borders regrowing from recent Winograd procedures. Remaining nails thickened and painful. See images above.    2. Vascular: Dorsalis pedis two out of four bilateral and posterior tibial pulses two out of   four bilateral, capillary refill normal.   3. Musculoskeletal: All muscle groups are graded 5 out of 5 in the foot and ankle. Pain noted to remaining hallux nails.   4. Neurological: Normal sharp dull sensation; reflexes normal.          ASSESSMENT AND PLAN:   Diagnoses and all orders for this visit:    Onychocryptosis    Pain in toes of both feet    Status post nail surgery        Plan:    -Patient examined, chart history reviewed.  -Discussed etiology of condition and various treatment options.  - Patient does have persistent pain associated with ingrown nails to both great toes.  She has had numerous procedures including  multiple P&A procedures in the past and most recently went a great procedure.  Her nails continue to grow back and cause pain.  She is hoping to have nails permanently excised at this time to both her right and left hallux nails.  - Discussed surgical excision of nail matrix of both great toes including benefits, risks, recovery period.    All treatment options have been discussed with the patient including both conservative and surgical attempts at correction. Potential risks and complications of surgical intervention were discussed at length which including but not not limited to death, loss of limb, post op pain, swelling, infection, bleeding, reoccurrence of the deformity, extended healing, and the possibility of further and future surgery.  No guarantees have been made to the patient.  Will have our  reach out to find time for surgery.    The patient indicates understanding of these issues and agrees to the plan.        Yaya Ramirez DPM    Dragon speech recognition software was used to prepare this note.  Errors in word recognition may occur.  Please contact me with any questions/concerns with this note.           [1]   Current Outpatient Medications   Medication Sig Dispense Refill    azelastine 0.1 % Nasal Solution 1 spray by Nasal route 2 (two) times daily. 30 mL 3    metoclopramide 10 MG Oral Tab Take 1 tablet (10 mg total) by mouth.      ondansetron 4 MG Oral Tablet Dispersible Take 1 tablet (4 mg total) by mouth.      erenumab-aooe 70 MG/ML SC Inject 1 mL (70 mg total) into the skin every 30 (thirty) days. 1 mL 2    SUMAtriptan 50 MG Oral Tab Use at onset; repeat once after 2 hours-ONLY 2 IN 24 HR MAX. This is a 30 day supply. 10 tablet 2    valACYclovir 1 G Oral Tab Take 1 tablet (1,000 mg total) by mouth every 12 (twelve) hours. X 2 days at the onset of each outbreak. This script is for more than one outbreak. 24 tablet 0    PHENTERMINE HCL 37.5 MG Oral Tab TAKE 1 TABLET(37.5 MG) BY MOUTH BEFORE  BREAKFAST 30 tablet 0    acyclovir 5 % External Ointment Apply 1 Application topically every 3 (three) hours for 5 days. (Patient not taking: Reported on 4/2/2025) 15 g 0    HYDROcodone-acetaminophen 5-325 MG Oral Tab Take 1 tablet by mouth every 6 (six) hours as needed for Pain. (Patient not taking: Reported on 1/9/2025) 20 tablet 0    amoxicillin clavulanate 875-125 MG Oral Tab Take 1 tablet by mouth 2 (two) times daily. (Patient not taking: Reported on 4/2/2025) 14 tablet 0    hydrOXYzine 25 MG Oral Tab  (Patient not taking: Reported on 4/2/2025)      SUMAtriptan (IMITREX) 25 MG Oral Tab Take 1 tablet (25 mg total) by mouth every 2 (two) hours as needed for Migraine. Use at onset; repeat once after 2 HRS-ONLY 2 IN 24 HR MAX 9 tablet 1   [2]   Past Medical History:   Abdominal pain    Bad breath    Belching    Bleeding nose    Bloating    Blood in the stool    COVID-19    Decorative tattoo    Depression    Diarrhea, unspecified    Enlarged lymph node    Fatigue    Flatulence/gas pain/belching    Food intolerance    Frequent urination    Frequent UTI    Headache disorder    History of depression    History of mental disorder    Hyperhidrosis    Pt pt \"recently diagnosed\"    Indigestion    Irregular bowel habits    Menses painful    Nausea    Night sweats    Pain with bowel movements    PONV (postoperative nausea and vomiting)    has never had anesthesia, but is highly suseptable to Nausea and vomitting    Stress    Vomiting    Weight gain   [3]   Past Surgical History:  Procedure Laterality Date    Foot surgery Left 01/2024    Nail bed surgery done at the Podiatrist office with Local anesthesia   [4]   Family History  Problem Relation Age of Onset    Anxiety Father     Substance Abuse Father     Bipolar Disorder Father     Other (Other) Father         substance abuse disorder    Diabetes Mother     Cancer Mother     Anxiety Mother     Other (Other) Mother         substance abuse disorder    Diabetes Sister      Anxiety Sister     Suicide History Sister     Anxiety Brother     OCD Brother     Suicide History Brother    [5]   Social History  Socioeconomic History    Marital status: Single   Tobacco Use    Smoking status: Former     Current packs/day: 0.00     Types: Cigarettes    Smokeless tobacco: Never    Tobacco comments:     vaping.    Vaping Use    Vaping status: Former   Substance and Sexual Activity    Alcohol use: Yes     Alcohol/week: 5.0 standard drinks of alcohol     Types: 5 Standard drinks or equivalent per week     Comment: occ    Drug use: Not Currently    Sexual activity: Yes   Other Topics Concern    Caffeine Concern Yes     Comment: 1 cup daily     Exercise Yes     Comment: walking    Seat Belt Yes    Special Diet No    Stress Concern No    Weight Concern Yes

## 2025-06-24 ENCOUNTER — TELEPHONE (OUTPATIENT)
Dept: PODIATRY CLINIC | Facility: CLINIC | Age: 30
End: 2025-06-24

## 2025-06-24 DIAGNOSIS — L03.031 PARONYCHIA OF TOE OF RIGHT FOOT: ICD-10-CM

## 2025-06-24 DIAGNOSIS — L03.032 PARONYCHIA OF TOE OF LEFT FOOT: ICD-10-CM

## 2025-06-24 DIAGNOSIS — L60.0 ONYCHOCRYPTOSIS: Primary | ICD-10-CM

## 2025-06-26 ENCOUNTER — TELEPHONE (OUTPATIENT)
Dept: PODIATRY CLINIC | Facility: CLINIC | Age: 30
End: 2025-06-26

## 2025-06-26 DIAGNOSIS — L60.0 ONYCHOCRYPTOSIS: Primary | ICD-10-CM

## 2025-06-26 DIAGNOSIS — L03.032 PARONYCHIA OF TOE OF LEFT FOOT: ICD-10-CM

## 2025-06-26 DIAGNOSIS — L03.031 PARONYCHIA OF TOE OF RIGHT FOOT: ICD-10-CM

## 2025-06-26 NOTE — TELEPHONE ENCOUNTER
SCHEDULED     Future Appointments   Date Time Provider Department Center   10/7/2025  8:15 AM Ely Ramirez DO [facility]   10/20/2025  1:15 PM BlockYaya DPM [id number]   10/27/2025  1:15 PM Yaya Ramirez DPM [id number]

## 2025-06-26 NOTE — TELEPHONE ENCOUNTER
Dr. Ramirez,     The patient is scheduled for outpatient foot surgery with Dr. Ramirez on 10/13 for the following procedure(s): Surgical excision of nail matrix of right and left great toe.      The patient has been advised to contact your office for pre-operative clearance.  The patient needs the following test/exams    __X__ History and Physical (H&P) may be no older than THIRTY (30) days prior to surgery. If possible, please complete within 7 days prior to surgery. If done before 7 days of surgery date, an update to the office visit note is required within 7 days prior to surgery.     ____ EKG for patients that are age 50 or older, have hypertension, diabetes or a history of cardiac disease or are obese.  This should be no older than 6 months prior to surgery.    __X__ Complete Metabolic Panel (CMP) & CBC. This should be no older than 3 months prior to surgery.    Please include any other test you deem necessary for medical clearance.    Thank you,   Sarah CORNEJO   Surgical Scheduler   [phone]

## 2025-07-03 NOTE — TELEPHONE ENCOUNTER
Please see note from referral . Pt will call back once she picks free standing external facility.

## 2025-07-16 ENCOUNTER — PATIENT MESSAGE (OUTPATIENT)
Dept: PODIATRY CLINIC | Facility: CLINIC | Age: 30
End: 2025-07-16

## 2025-07-16 DIAGNOSIS — L03.032 PARONYCHIA OF TOE OF LEFT FOOT: Primary | ICD-10-CM

## 2025-07-17 NOTE — TELEPHONE ENCOUNTER
Patient scheduled for surgical excision of nail matrix of right and left great toe on 10/13/25    Please see photo and advise on recommendations for patient

## 2025-07-18 NOTE — TELEPHONE ENCOUNTER
S/w patient- Informed her of oral antibiotic. Booked her for opening on 7/23/25 at 5:45 next week. I recommended soaking toe daily with warm water and epsom salts for discomfort. I informed her that if symptoms were to persist despite antibiotics, it would be recommended that she go to UC or ER. She was agreeable to plan of care.

## 2025-07-18 NOTE — TELEPHONE ENCOUNTER
This does look concerning, rx placed for augmentin.    Can we offer follow up visit with me or Dr. Urrutia. Dr Urrutia has performed past procedures and may be good to see her next week if I don't have any availability. Would recommend UC or ER if symptoms worsen or fail to improve.

## 2025-07-22 ENCOUNTER — OFFICE VISIT (OUTPATIENT)
Dept: FAMILY MEDICINE CLINIC | Facility: CLINIC | Age: 30
End: 2025-07-22
Payer: COMMERCIAL

## 2025-07-22 ENCOUNTER — TELEPHONE (OUTPATIENT)
Dept: FAMILY MEDICINE CLINIC | Facility: CLINIC | Age: 30
End: 2025-07-22

## 2025-07-22 VITALS
OXYGEN SATURATION: 98 % | BODY MASS INDEX: 28 KG/M2 | RESPIRATION RATE: 18 BRPM | DIASTOLIC BLOOD PRESSURE: 70 MMHG | WEIGHT: 163.81 LBS | TEMPERATURE: 98 F | SYSTOLIC BLOOD PRESSURE: 120 MMHG | HEART RATE: 78 BPM

## 2025-07-22 DIAGNOSIS — Z01.818 PRE-OP EXAMINATION: Primary | ICD-10-CM

## 2025-07-22 DIAGNOSIS — L60.0 INGROWN TOENAIL: ICD-10-CM

## 2025-07-22 DIAGNOSIS — L03.032 PARONYCHIA OF GREAT TOE OF LEFT FOOT: ICD-10-CM

## 2025-07-22 DIAGNOSIS — E66.811 CLASS 1 OBESITY WITH BODY MASS INDEX (BMI) OF 31.0 TO 31.9 IN ADULT, UNSPECIFIED OBESITY TYPE, UNSPECIFIED WHETHER SERIOUS COMORBIDITY PRESENT: ICD-10-CM

## 2025-07-22 PROBLEM — Z30.41 ORAL CONTRACEPTIVE USE: Status: RESOLVED | Noted: 2018-03-06 | Resolved: 2025-07-22

## 2025-07-22 PROCEDURE — 99213 OFFICE O/P EST LOW 20 MIN: CPT | Performed by: FAMILY MEDICINE

## 2025-07-22 RX ORDER — SULFAMETHOXAZOLE AND TRIMETHOPRIM 800; 160 MG/1; MG/1
1 TABLET ORAL EVERY 12 HOURS
COMMUNITY
Start: 2025-07-19 | End: 2025-07-26

## 2025-07-22 NOTE — H&P
Lory Son is a 29 year old female who presents for a pre-operative physical exam. Patient is to have bilateral great toenails removed, to be done by Dr. Ramirez at [facility] on TBD.      HPI:   Pt complains of left great toe pain. Got infected last week.     Was in ER Saturday with toenail infection. It's gotten better, but not gone. Was sent home with augmentin.   Has had chronic issues, many many surgeries on toenails.   It is still draining. Scheduled to see podiatry in October, but might happen Friday instead.     Has been off phentermine since last week since she's been taking sudafed for nasal congestion.     No h/o complications with anesthesia or family history.   No h/o blood transfusions.   Has had a URI in the past week, but getting better.   A little cough and congestion, sinus pressure is better.   No GI symptoms.   No  symptoms. No urinary symptoms.   Just ended period.       Current Outpatient Medications   Medication Sig Dispense Refill    sulfamethoxazole-trimethoprim -160 MG Oral Tab per tablet Take 1 tablet by mouth in the morning and 1 tablet in the evening.      amoxicillin clavulanate 875-125 MG Oral Tab Take 1 tablet by mouth 2 (two) times daily. 20 tablet 0    azelastine 0.1 % Nasal Solution 1 spray by Nasal route 2 (two) times daily. 30 mL 3    ondansetron 4 MG Oral Tablet Dispersible Take 1 tablet (4 mg total) by mouth.      SUMAtriptan 50 MG Oral Tab Use at onset; repeat once after 2 hours-ONLY 2 IN 24 HR MAX. This is a 30 day supply. 10 tablet 2    valACYclovir 1 G Oral Tab Take 1 tablet (1,000 mg total) by mouth every 12 (twelve) hours. X 2 days at the onset of each outbreak. This script is for more than one outbreak. 24 tablet 0    PHENTERMINE HCL 37.5 MG Oral Tab TAKE 1 TABLET(37.5 MG) BY MOUTH BEFORE BREAKFAST (Patient not taking: Reported on 7/22/2025) 30 tablet 0    metoclopramide 10 MG Oral Tab Take 1 tablet (10 mg total) by mouth.        Allergies:   Allergies    Allergen Reactions    Nsaids HIVES and SWELLING     Angioedema upper lip      Past Medical History:    Abdominal pain    Bad breath    Belching    Bleeding nose    Bloating    Blood in the stool    COVID-19    Decorative tattoo    Depression    Diarrhea, unspecified    Enlarged lymph node    Fatigue    Flatulence/gas pain/belching    Food intolerance    Frequent urination    Frequent UTI    Headache disorder    History of depression    History of mental disorder    Hyperhidrosis    Pt pt \"recently diagnosed\"    Indigestion    Irregular bowel habits    Menses painful    Nausea    Night sweats    Pain with bowel movements    PONV (postoperative nausea and vomiting)    has never had anesthesia, but is highly suseptable to Nausea and vomitting    Stress    Vomiting    Weight gain      Past Surgical History:   Procedure Laterality Date    Foot surgery Left 01/2024    Nail bed surgery done at the Podiatrist office with Local anesthesia      Family History   Problem Relation Age of Onset    Anxiety Father     Substance Abuse Father     Bipolar Disorder Father     Other (Other) Father         substance abuse disorder    Diabetes Mother     Cancer Mother     Anxiety Mother     Other (Other) Mother         substance abuse disorder    Diabetes Sister     Anxiety Sister     Suicide History Sister     Anxiety Brother     OCD Brother     Suicide History Brother       Social History:   Social History     Socioeconomic History    Marital status: Single   Tobacco Use    Smoking status: Former     Current packs/day: 0.00     Types: Cigarettes    Smokeless tobacco: Never    Tobacco comments:     vaping.    Vaping Use    Vaping status: Former   Substance and Sexual Activity    Alcohol use: Yes     Alcohol/week: 5.0 standard drinks of alcohol     Types: 5 Standard drinks or equivalent per week     Comment: occ    Drug use: Not Currently    Sexual activity: Yes   Other Topics Concern    Caffeine Concern Yes     Comment: 1 cup daily      Exercise Yes     Comment: walking    Seat Belt Yes    Special Diet No    Stress Concern No    Weight Concern Yes     Social Drivers of Health     Food Insecurity: No Food Insecurity (7/19/2025)    Received from [facility]    Food Insecurity     Currently or in the past 3 months, have you worried your food would run out before you had money to buy more?: No     In the past 12 months, have you run out of food or been unable to get more?: No   Transportation Needs: No Transportation Needs (7/19/2025)    Received from [facility]    Transportation Needs     Currently or in the past 3 months, has lack of transportation kept you from medical appointments, getting food or medicine, or providing care to a family member?: No   Stress: Not on File (4/10/2024)    Received from [facility]    Stress     Stress: 0   Housing Stability: Not At Risk (1/9/2025)    NCSS - Housing/Utilities     Has Housing: Yes     Worried About Losing Housing: No     Unable to Get Utilities: No      Occ: full time. : . Children: .   Exercise: none since toe is painful.  Diet: eating healthy, watching portions.      REVIEW OF SYSTEMS:   GENERAL: feels well otherwise  SKIN: denies any unusual skin lesions  EYES:denies blurred vision or double vision  HEENT: denies nasal congestion, sinus pain or ST  LUNGS: denies shortness of breath with exertion  CARDIOVASCULAR: denies chest pain on exertion  GI: denies abdominal pain,denies heartburn  : denies dysuria, vaginal discharge or itching,periods regular denies nocturia or changes in stream  MUSCULOSKELETAL: denies back pain, + toe pain   NEURO: denies headaches  PSYCHE: denies depression or anxiety  HEMATOLOGIC: denies hx of anemia  ENDOCRINE: denies thyroid history  ALL/ASTHMA: NSAIDS.     EXAM:   /70   Pulse 78   Temp 97.9 °F (36.6 °C) (Temporal)   Resp 18   Wt 163 lb 12.8 oz (74.3 kg)   LMP 07/22/2025 (Exact Date)   SpO2 98%   BMI 28.12 kg/m²    GENERAL: well developed, well nourished,in no apparent distress  SKIN: no rashes,no suspicious lesions, other than swelling and drainage around left great toenail.   HEENT: atraumatic, normocephalic, fluid in right ear, not infected, no sinus tenderness or drainage.   EYES:PERRLA, EOMI,  conjunctiva are clear  NECK: supple,no adenopathy   CHEST: no chest tenderness  BREAST: not done   LUNGS: clear to auscultation  CARDIO: RRR without murmur  GI: good BS's,no masses, HSM or tenderness  : deferred   MUSCULOSKELETAL: back is not tender,FROM of the back  EXTREMITIES: no cyanosis, clubbing or edema  NEURO: Oriented times three,cranial nerves are intact,motor and sensory are grossly intact    ASSESSMENT AND PLAN:   Lory Son is a 29 year old female who presents for a pre-operative physical exam. Patient is to have bilateral great toenails removed, to be done by Dr. Ramirez at [facility] on TBD.  Pt has the following conditions: chronic paronychia, currently infected. Pt has no significant history of cardiac or pulmonary conditions. Pt is a good surgical candidate. This consult was sent back the referring physician, Dr. Teja Ramirez.     1. Pre-op examination  Cleared for surgery from a medical standpoint. No history of cardiovascular disease.     2. Ingrown toenail  Surgery recommended.     3. Paronychia of great toe of left foot  Currently on augmentin and bactrim. A little better per pt. Podiatry will decide if okay to proceed while infected or if it needs more healing.     4. Class 1 obesity with body mass index (BMI) of 31.0 to 31.9 in adult, unspecified obesity type, unspecified whether serious comorbidity present  Phentermine has been very helpful, advised to hold for now while on antibiotics and decongestants.

## 2025-07-22 NOTE — TELEPHONE ENCOUNTER
Pt scheduled the following appt via My Chart:    Pre surgical incase he has to do surgery friday     Pt scheduled to be seen today at 1:45pm. Ok to keep 15 min appt as is?    Please advise.

## 2025-07-23 ENCOUNTER — OFFICE VISIT (OUTPATIENT)
Dept: PODIATRY CLINIC | Facility: CLINIC | Age: 30
End: 2025-07-23
Payer: COMMERCIAL

## 2025-07-23 DIAGNOSIS — L60.0 ONYCHOCRYPTOSIS: ICD-10-CM

## 2025-07-23 DIAGNOSIS — M79.674 PAIN IN TOES OF BOTH FEET: ICD-10-CM

## 2025-07-23 DIAGNOSIS — L03.032 PARONYCHIA OF TOE OF LEFT FOOT: Primary | ICD-10-CM

## 2025-07-23 DIAGNOSIS — Z98.890 STATUS POST NAIL SURGERY: ICD-10-CM

## 2025-07-23 DIAGNOSIS — M79.675 PAIN IN TOES OF BOTH FEET: ICD-10-CM

## 2025-07-23 PROCEDURE — 99213 OFFICE O/P EST LOW 20 MIN: CPT | Performed by: STUDENT IN AN ORGANIZED HEALTH CARE EDUCATION/TRAINING PROGRAM

## 2025-08-04 ENCOUNTER — TELEPHONE (OUTPATIENT)
Dept: ORTHOPEDICS CLINIC | Facility: CLINIC | Age: 30
End: 2025-08-04

## 2025-08-11 DIAGNOSIS — L03.032 PARONYCHIA OF TOE OF LEFT FOOT: Primary | ICD-10-CM

## 2025-08-11 DIAGNOSIS — L60.0 ONYCHOCRYPTOSIS: Primary | ICD-10-CM

## 2025-08-11 DIAGNOSIS — L03.031 PARONYCHIA OF TOE OF RIGHT FOOT: ICD-10-CM

## 2025-08-11 DIAGNOSIS — L03.032 PARONYCHIA OF TOE OF LEFT FOOT: ICD-10-CM

## 2025-08-11 DIAGNOSIS — L60.0 ONYCHOCRYPTOSIS: ICD-10-CM

## 2025-08-13 ENCOUNTER — TELEPHONE (OUTPATIENT)
Dept: FAMILY MEDICINE CLINIC | Facility: CLINIC | Age: 30
End: 2025-08-13

## 2025-08-25 ENCOUNTER — PATIENT MESSAGE (OUTPATIENT)
Dept: PODIATRY CLINIC | Facility: CLINIC | Age: 30
End: 2025-08-25

## 2025-08-25 DIAGNOSIS — L03.032 PARONYCHIA OF TOE OF LEFT FOOT: Primary | ICD-10-CM

## 2025-08-25 DIAGNOSIS — M79.674 PAIN IN TOES OF BOTH FEET: ICD-10-CM

## 2025-08-25 DIAGNOSIS — M79.675 PAIN IN TOES OF BOTH FEET: ICD-10-CM

## 2025-08-25 DIAGNOSIS — L03.031 PARONYCHIA OF TOE OF RIGHT FOOT: ICD-10-CM

## (undated) DIAGNOSIS — E55.9 VITAMIN D DEFICIENCY: Primary | ICD-10-CM

## (undated) DIAGNOSIS — G43.109 MIGRAINE WITH AURA AND WITHOUT STATUS MIGRAINOSUS, NOT INTRACTABLE: ICD-10-CM

## (undated) DIAGNOSIS — K52.9 CHRONIC DIARRHEA: ICD-10-CM

## (undated) DEVICE — ANTIBACTERIAL UNDYED BRAIDED (POLYGLACTIN 910), SYNTHETIC ABSORBABLE SUTURE: Brand: COATED VICRYL

## (undated) DEVICE — DRAPE C ARM W54XL78IN FOR FLROSCN

## (undated) DEVICE — DISPOSABLE TOURNIQUET CUFF SINGLE BLADDER, DUAL PORT AND QUICK CONNECT CONNECTOR: Brand: COLOR CUFF

## (undated) DEVICE — APPLICATOR PREP 26ML CHG 2% ISO ALC 70%

## (undated) DEVICE — BANDAGE,GAUZE,CONFORMING,3X4.1Y,STRL,LF: Brand: MEDLINE

## (undated) DEVICE — SUT MCRYL 4-0 18IN PS-2 ABSRB UD 19MM 3/8 CIR

## (undated) DEVICE — SUT PROL 2-0 30IN CT-2 NABSRB BLU L26MM 1/2 C

## (undated) DEVICE — BNDG,ELSTC,MATRIX,STRL,4"X5YD,LF,HOOK&LP: Brand: MEDLINE

## (undated) DEVICE — Device

## (undated) DEVICE — SOLUTION IRRIG 1000ML 0.9% NACL USP BTL

## (undated) DEVICE — STRIP PK 0.25INX5YD TIGHTLY WVN IODO CURAD

## (undated) DEVICE — SYRINGE MED 30ML STD CLR PLAS LL TIP N CTRL

## (undated) DEVICE — SUT ETHLN 3-0 18IN PS-2 NABSRB BLK 19MM 3/8 C

## (undated) DEVICE — SURG GL, SENSICARE PI ORTHO LT,LF,PF,7.5: Brand: MEDLINE

## (undated) DEVICE — LOWER EXTREMITY CDS-LF: Brand: MEDLINE INDUSTRIES, INC.

## (undated) DEVICE — DISPOSABLE TOURNIQUET CUFF DUAL BLADDER, DUAL PORT AND QUICK CONNECT CONNECTOR: Brand: COLOR CUFF

## (undated) DEVICE — DRAPE,T,LIMB,BILATERAL,STERILE: Brand: MEDLINE

## (undated) DEVICE — SUT PDS II 4-0 18IN PS-2 ABSRB UD L19MM 3/8

## (undated) DEVICE — SPONGE 4X4 10PK

## (undated) DEVICE — STRL PENROSE DRAIN 18" X 1/2": Brand: CARDINAL HEALTH

## (undated) DEVICE — PREMIUM WET SKIN PREP TRAY: Brand: MEDLINE INDUSTRIES, INC.

## (undated) DEVICE — CONTAINER,SPECIMEN,PNEU TUBE,4OZ,OR STRL: Brand: MEDLINE

## (undated) DEVICE — SLEEVE COMPR MD KNEE LEN SGL USE KENDALL SCD

## (undated) NOTE — LETTER
[address]  [address]  Authorization for Surgical Operation and Procedure     Date:___________                                                                                                         Time:__________  I hereby authorize Surgeon(s):  Arnaud Urrutia DPM, my physician and his/her assistants (if applicable), which may include medical students, residents, and/or fellows, to perform the following surgical operation/ procedure and administer such anesthesia as may be determined necessary by my physician:  Operation/Procedure name (s) Procedure(s):  TOTAL NAIL AVULSION WITH DEBRIDEMENT OF EDGES BILATERAL GREAT TOES on Lory HESS Huy   2.   I recognize that during the surgical operation/procedure, unforeseen conditions may necessitate additional or different procedures than those listed above.  I, therefore, further authorize and request that the above-named surgeon, assistants, or designees perform such procedures as are, in their judgment, necessary and desirable.    3.   My surgeon/physician has discussed prior to my surgery the potential benefits, risks and side effects of this procedure; the likelihood of achieving goals; and potential problems that might occur during recuperation.  They also discussed reasonable alternatives to the procedure, including risks, benefits, and side effects related to the alternatives and risks related to not receiving this procedure.  I have had all my questions answered and I acknowledge that no guarantee has been made as to the result that may be obtained.    4.   Should the need arise during my operation/procedure, which includes change of level of care prior to discharge, I also consent to the administration of blood and/or blood products.  Further, I understand that despite careful testing and screening of blood or blood products by collecting agencies, I may still be subject to ill effects as a result of receiving a blood  transfusion and/or blood products.  The following are some, but not all, of the potential risks that can occur: fever and allergic reactions, hemolytic reactions, transmission of diseases such as Hepatitis, AIDS and Cytomegalovirus (CMV) and fluid overload.  In the event that I wish to have an autologous transfusion of my own blood, or a directed donor transfusion, I will discuss this with my physician.  Check only if Refusing Blood or Blood Products  I understand refusal of blood or blood products as deemed necessary by my physician may have serious consequences to my condition to include possible death. I hereby assume responsibility for my refusal and release the hospital, its personnel, and my physicians from any responsibility for the consequences of my refusal.          o  Refuse      5.   I authorize the use of any specimen, organs, tissues, body parts or foreign objects that may be removed from my body during the operation/procedure for diagnosis, research or teaching purposes and their subsequent disposal by hospital authorities.  I also authorize the release of specimen test results and/or written reports to my treating physician on the hospital medical staff or other referring or consulting physicians involved in my care, at the discretion of the Pathologist or my treating physician.    6.   I consent to the photographing or videotaping of the operations or procedures to be performed, including appropriate portions of my body for medical, scientific, or educational purposes, provided my identity is not revealed by the pictures or by descriptive texts accompanying them.  If the procedure has been photographed/videotaped, the surgeon will obtain the original picture, image, videotape or CD.  The hospital will not be responsible for storage, release or maintenance of the picture, image, tape or CD.    7.   I consent to the presence of a  or observers in the operating room as deemed  necessary by my physician or their designees.    8.   I recognize that in the event my procedure results in extended X-Ray/fluoroscopy time, I may develop a skin reaction.    9. If I have a Do Not Attempt Resuscitation (DNAR) order in place, that status will be suspended while in the operating room, procedural suite, and during the recovery period unless otherwise explicitly stated by me (or a person authorized to consent on my behalf). The surgeon or my attending physician will determine when the applicable recovery period ends for purposes of reinstating the DNAR order.  10. Patients having a sterilization procedure: I understand that if the procedure is successful the results will be permanent and it will therefore be impossible for me to inseminate, conceive, or bear children.  I also understand that the procedure is intended to result in sterility, although the result has not been guaranteed.   11. I acknowledge that my physician has explained sedation/analgesia administration to me including the risk and benefits I consent to the administration of sedation/analgesia as may be necessary or desirable in the judgment of my physician.    I CERTIFY THAT I HAVE READ AND FULLY UNDERSTAND THE ABOVE CONSENT TO OPERATION and/or OTHER PROCEDURE.    _________________________________________  __________________________________  Signature of Patient     Signature of Responsible Person         ___________________________________         Printed Name of Responsible Person           _________________________________                 Relationship to Patient  _________________________________________  ______________________________  Signature of Witness          Date  Time      Patient Name: Lory Son     : 1995                 Printed: 2024     Medical Record #: [id number]                     Page 1 of 2                                    [address]   [address]    Consent for Anesthesia    I, Lory Son agree to be cared for by an anesthesiologist, who is specially trained to monitor me and give me medicine to put me to sleep or keep me comfortable during my procedure    I understand that my anesthesiologist is not an employee or agent of [facility] or [employer]. He or she works for [employer].    As the patient asking for anesthesia services, I agree to:  Allow the anesthesiologist (anesthesia doctor) to give me medicine and do additional procedures as necessary. Some examples are: Starting or using an “IV” to give me medicine, fluids or blood during my procedure, and having a breathing tube placed to help me breathe when I’m asleep (intubation). In the event that my heart stops working properly, I understand that my anesthesiologist will make every effort to sustain my life, unless otherwise directed by [facility] Do Not Resuscitate documents.  Tell my anesthesia doctor before my procedure:  If I am pregnant.  The last time that I ate or drank.  All of the medicines I take (including prescriptions, herbal supplements, and pills I can buy without a prescription (including street drugs/illegal medications). Failure to inform my anesthesiologist about these medicines may increase my risk of anesthetic complications.  If I am allergic to anything or have had a reaction to anesthesia before.  I understand how the anesthesia medicine will help me (benefits).  I understand that with any type of anesthesia medicine there are risks:  The most common risks are: nausea, vomiting, sore throat, muscle soreness, damage to my eyes, mouth, or teeth (from breathing tube placement).  Rare risks include: remembering what happened during my procedure, allergic reactions to medications, injury to my airway, heart, lungs, vision, nerves, or muscles and in extremely rare instances death.  My doctor has explained to me other choices available  to me for my care (alternatives).  Pregnant Patients (“epidural”):  I understand that the risks of having an epidural (medicine given into my back to help control pain during labor), include itching, low blood pressure, difficulty urinating, headache or slowing of the baby’s heart. Very rare risks include infection, bleeding, seizure, irregular heart rhythms and nerve injury.  Regional Anesthesia (“spinal”, “epidural”, & “nerve blocks”):  I understand that rare but potential complications include headache, bleeding, infection, seizure, irregular heart rhythms, and nerve injury.    I can change my mind about having anesthesia services at any time before I get the medicine.    _____________________________________________________________________________  Patient (or Representative) Signature/Relationship to Patient  Date   Time    _____________________________________________________________________________   Name (if used)    Language/Organization   Time    _____________________________________________________________________________  Anesthesiologist Signature     Date   Time  I have discussed the procedure and information above with the patient (or patient’s representative) and answered their questions. The patient or their representative has agreed to have anesthesia services.    _____________________________________________________________________________  Witness        Date   Time  I have verified that the signature is that of the patient or patient’s representative, and that it was signed before the procedure  Patient Name: Lory Son     : 1995                 Printed: 2024     Medical Record #: [id number]                     Page 2 of 2

## (undated) NOTE — LETTER
1/24/2024          To Whom It May Concern:    Lory Son is currently under my medical care and may not return to work at this time.    Please excuse Lory for 2 weeks.  She may return to work on 2/7/2024.      If you require additional information please contact our office.        Sincerely,    Arnaud Urrutia DPM

## (undated) NOTE — LETTER
05/15/24    Re: Lory Son  : 1995    To Whom It May Concern:  Lory Son is under my care.  Please excuse her from work from 05/15/2024 through 2024. She may return to work as of 2024.   If you have any questions concerning this letter, please feel free to contact my office.      Sincerely yours,      Isaac Mae MD

## (undated) NOTE — LETTER
Date & Time: 3/27/2021, 11:44 AM  Patient: Renetta Hanley  Encounter Provider(s):    Maury Zaldivar PA-C       To Whom It May Concern:    Yaakov Bradley was seen and treated in our department on 3/27/2021. COVID-19 PCR testing performed and negative.

## (undated) NOTE — LETTER
11/21/2024          To Whom It May Concern:    Lory Son is currently under my medical care and may return to work on November 22, 2024 with no restrictions.    If you require additional information please contact our office.        Sincerely,    Arnaud Urrutia DPM

## (undated) NOTE — LETTER
02/08/22      Titi Harrell        Our records indicate you are due for your annual physical w/pap. Please give our office a call at [phone], at your earliest convenience, to schedule an appointment.          Thank you,      [facility]

## (undated) NOTE — MR AVS SNAPSHOT
After Visit Summary   2/8/2021    Gavi Jameson    MRN: [id number]           Visit Information     Date & Time  2/8/2021 10:45 AM Provider  Lyla Alpers, DO Department  [address] [place] [address], Ursula Gaviria Dept.  Phone  [phone] CHLAMYDIA/GONOCOCCUS, ADRIENNE [[id number] CUSTOM]  2/8/2021 (Approximate) 5/1/5114    COMP METABOLIC PANEL (14) [[id number] CUSTOM]  2/8/2021 (Approximate) 2/9/2022    HEPATITIS PANEL, ACUTE (4) [5574242 CUSTOM]  2/8/2021 (Approximate) 2/8/2022    HIV AG AB COMBO [ [place]  OFFICE VISIT   Primary Care Providers  Treatment for mild illness or injury that does not require immediate attention.  Average cost  $70*   [facility]  Monday – Friday  8:00 am – 8:00 pm   Saturday – Sunday

## (undated) NOTE — LETTER
[address]  [address]  Authorization for Surgical Operation and Procedure     Date:___________                                                                                                         Time:__________  I hereby authorize Surgeon(s):  Arnaud Urrutia DPM, my physician and his/her assistants (if applicable), which may include medical students, residents, and/or fellows, to perform the following surgical operation/ procedure and administer such anesthesia as may be determined necessary by my physician:  Operation/Procedure name (s) Procedure(s):  Revision of Winograd onychoplasty''s medial and lateral nail borders of the left hallux on Lory Son   2.   I recognize that during the surgical operation/procedure, unforeseen conditions may necessitate additional or different procedures than those listed above.  I, therefore, further authorize and request that the above-named surgeon, assistants, or designees perform such procedures as are, in their judgment, necessary and desirable.    3.   My surgeon/physician has discussed prior to my surgery the potential benefits, risks and side effects of this procedure; the likelihood of achieving goals; and potential problems that might occur during recuperation.  They also discussed reasonable alternatives to the procedure, including risks, benefits, and side effects related to the alternatives and risks related to not receiving this procedure.  I have had all my questions answered and I acknowledge that no guarantee has been made as to the result that may be obtained.    4.   Should the need arise during my operation/procedure, which includes change of level of care prior to discharge, I also consent to the administration of blood and/or blood products.  Further, I understand that despite careful testing and screening of blood or blood products by collecting agencies, I may still be subject to ill effects as a result of  receiving a blood transfusion and/or blood products.  The following are some, but not all, of the potential risks that can occur: fever and allergic reactions, hemolytic reactions, transmission of diseases such as Hepatitis, AIDS and Cytomegalovirus (CMV) and fluid overload.  In the event that I wish to have an autologous transfusion of my own blood, or a directed donor transfusion, I will discuss this with my physician.  Check only if Refusing Blood or Blood Products  I understand refusal of blood or blood products as deemed necessary by my physician may have serious consequences to my condition to include possible death. I hereby assume responsibility for my refusal and release the hospital, its personnel, and my physicians from any responsibility for the consequences of my refusal.          o  Refuse      5.   I authorize the use of any specimen, organs, tissues, body parts or foreign objects that may be removed from my body during the operation/procedure for diagnosis, research or teaching purposes and their subsequent disposal by hospital authorities.  I also authorize the release of specimen test results and/or written reports to my treating physician on the hospital medical staff or other referring or consulting physicians involved in my care, at the discretion of the Pathologist or my treating physician.    6.   I consent to the photographing or videotaping of the operations or procedures to be performed, including appropriate portions of my body for medical, scientific, or educational purposes, provided my identity is not revealed by the pictures or by descriptive texts accompanying them.  If the procedure has been photographed/videotaped, the surgeon will obtain the original picture, image, videotape or CD.  The hospital will not be responsible for storage, release or maintenance of the picture, image, tape or CD.    7.   I consent to the presence of a  or observers in the operating room  as deemed necessary by my physician or their designees.    8.   I recognize that in the event my procedure results in extended X-Ray/fluoroscopy time, I may develop a skin reaction.    9. If I have a Do Not Attempt Resuscitation (DNAR) order in place, that status will be suspended while in the operating room, procedural suite, and during the recovery period unless otherwise explicitly stated by me (or a person authorized to consent on my behalf). The surgeon or my attending physician will determine when the applicable recovery period ends for purposes of reinstating the DNAR order.  10. Patients having a sterilization procedure: I understand that if the procedure is successful the results will be permanent and it will therefore be impossible for me to inseminate, conceive, or bear children.  I also understand that the procedure is intended to result in sterility, although the result has not been guaranteed.   11. I acknowledge that my physician has explained sedation/analgesia administration to me including the risk and benefits I consent to the administration of sedation/analgesia as may be necessary or desirable in the judgment of my physician.    I CERTIFY THAT I HAVE READ AND FULLY UNDERSTAND THE ABOVE CONSENT TO OPERATION and/or OTHER PROCEDURE.    _________________________________________  __________________________________  Signature of Patient     Signature of Responsible Person         ___________________________________         Printed Name of Responsible Person           _________________________________                 Relationship to Patient  _________________________________________  ______________________________  Signature of Witness          Date  Time      Patient Name: Lory Son     : 1995                 Printed: 2024     Medical Record #: [id number]                     Page 1 of 2                                    [address]   [address]    Consent for Anesthesia    I, Lory Son agree to be cared for by an anesthesiologist, who is specially trained to monitor me and give me medicine to put me to sleep or keep me comfortable during my procedure    I understand that my anesthesiologist is not an employee or agent of [facility] or [employer]. He or she works for [employer].    As the patient asking for anesthesia services, I agree to:  Allow the anesthesiologist (anesthesia doctor) to give me medicine and do additional procedures as necessary. Some examples are: Starting or using an “IV” to give me medicine, fluids or blood during my procedure, and having a breathing tube placed to help me breathe when I’m asleep (intubation). In the event that my heart stops working properly, I understand that my anesthesiologist will make every effort to sustain my life, unless otherwise directed by [facility] Do Not Resuscitate documents.  Tell my anesthesia doctor before my procedure:  If I am pregnant.  The last time that I ate or drank.  All of the medicines I take (including prescriptions, herbal supplements, and pills I can buy without a prescription (including street drugs/illegal medications). Failure to inform my anesthesiologist about these medicines may increase my risk of anesthetic complications.  If I am allergic to anything or have had a reaction to anesthesia before.  I understand how the anesthesia medicine will help me (benefits).  I understand that with any type of anesthesia medicine there are risks:  The most common risks are: nausea, vomiting, sore throat, muscle soreness, damage to my eyes, mouth, or teeth (from breathing tube placement).  Rare risks include: remembering what happened during my procedure, allergic reactions to medications, injury to my airway, heart, lungs, vision, nerves, or muscles and in extremely rare instances death.  My doctor has explained to me other choices available  to me for my care (alternatives).  Pregnant Patients (“epidural”):  I understand that the risks of having an epidural (medicine given into my back to help control pain during labor), include itching, low blood pressure, difficulty urinating, headache or slowing of the baby’s heart. Very rare risks include infection, bleeding, seizure, irregular heart rhythms and nerve injury.  Regional Anesthesia (“spinal”, “epidural”, & “nerve blocks”):  I understand that rare but potential complications include headache, bleeding, infection, seizure, irregular heart rhythms, and nerve injury.    I can change my mind about having anesthesia services at any time before I get the medicine.    _____________________________________________________________________________  Patient (or Representative) Signature/Relationship to Patient  Date   Time    _____________________________________________________________________________   Name (if used)    Language/Organization   Time    _____________________________________________________________________________  Anesthesiologist Signature     Date   Time  I have discussed the procedure and information above with the patient (or patient’s representative) and answered their questions. The patient or their representative has agreed to have anesthesia services.    _____________________________________________________________________________  Witness        Date   Time  I have verified that the signature is that of the patient or patient’s representative, and that it was signed before the procedure  Patient Name: Lory Son     : 1995                 Printed: 2024     Medical Record #: [id number]                     Page 2 of 2

## (undated) NOTE — LETTER
[address]  [id number]  Authorization for Surgical Operation and Procedure     Date:___________                                                                                                         Time:__________  I hereby authorize Surgeon(s):  Arnaud Urrutia DPM, my physician and his/her assistants (if applicable), which may include medical students, residents, and/or fellows, to perform the following surgical operation/ procedure and administer such anesthesia as may be determined necessary by my physician:  Operation/Procedure name (s) Procedure(s):  Revision of Winograd onychoplasty''s medial and lateral nail borders of the left hallux on Lory Son   2.   I recognize that during the surgical operation/procedure, unforeseen conditions may necessitate additional or different procedures than those listed above.  I, therefore, further authorize and request that the above-named surgeon, assistants, or designees perform such procedures as are, in their judgment, necessary and desirable.    3.   My surgeon/physician has discussed prior to my surgery the potential benefits, risks and side effects of this procedure; the likelihood of achieving goals; and potential problems that might occur during recuperation.  They also discussed reasonable alternatives to the procedure, including risks, benefits, and side effects related to the alternatives and risks related to not receiving this procedure.  I have had all my questions answered and I acknowledge that no guarantee has been made as to the result that may be obtained.    4.   Should the need arise during my operation/procedure, which includes change of level of care prior to discharge, I also consent to the administration of blood and/or blood products.  Further, I understand that despite careful testing and screening of blood or blood products by collecting agencies, I may still be subject to ill effects as a result of  receiving a blood transfusion and/or blood products.  The following are some, but not all, of the potential risks that can occur: fever and allergic reactions, hemolytic reactions, transmission of diseases such as Hepatitis, AIDS and Cytomegalovirus (CMV) and fluid overload.  In the event that I wish to have an autologous transfusion of my own blood, or a directed donor transfusion, I will discuss this with my physician.  Check only if Refusing Blood or Blood Products  I understand refusal of blood or blood products as deemed necessary by my physician may have serious consequences to my condition to include possible death. I hereby assume responsibility for my refusal and release the hospital, its personnel, and my physicians from any responsibility for the consequences of my refusal.          o  Refuse      5.   I authorize the use of any specimen, organs, tissues, body parts or foreign objects that may be removed from my body during the operation/procedure for diagnosis, research or teaching purposes and their subsequent disposal by hospital authorities.  I also authorize the release of specimen test results and/or written reports to my treating physician on the hospital medical staff or other referring or consulting physicians involved in my care, at the discretion of the Pathologist or my treating physician.    6.   I consent to the photographing or videotaping of the operations or procedures to be performed, including appropriate portions of my body for medical, scientific, or educational purposes, provided my identity is not revealed by the pictures or by descriptive texts accompanying them.  If the procedure has been photographed/videotaped, the surgeon will obtain the original picture, image, videotape or CD.  The hospital will not be responsible for storage, release or maintenance of the picture, image, tape or CD.    7.   I consent to the presence of a  or observers in the operating room  as deemed necessary by my physician or their designees.    8.   I recognize that in the event my procedure results in extended X-Ray/fluoroscopy time, I may develop a skin reaction.    9. If I have a Do Not Attempt Resuscitation (DNAR) order in place, that status will be suspended while in the operating room, procedural suite, and during the recovery period unless otherwise explicitly stated by me (or a person authorized to consent on my behalf). The surgeon or my attending physician will determine when the applicable recovery period ends for purposes of reinstating the DNAR order.  10. Patients having a sterilization procedure: I understand that if the procedure is successful the results will be permanent and it will therefore be impossible for me to inseminate, conceive, or bear children.  I also understand that the procedure is intended to result in sterility, although the result has not been guaranteed.   11. I acknowledge that my physician has explained sedation/analgesia administration to me including the risk and benefits I consent to the administration of sedation/analgesia as may be necessary or desirable in the judgment of my physician.    I CERTIFY THAT I HAVE READ AND FULLY UNDERSTAND THE ABOVE CONSENT TO OPERATION and/or OTHER PROCEDURE.    _________________________________________  __________________________________  Signature of Patient     Signature of Responsible Person         ___________________________________         Printed Name of Responsible Person           _________________________________                 Relationship to Patient  _________________________________________  ______________________________  Signature of Witness          Date  Time      Patient Name: Lory Son     : 1995                 Printed: 2024     Medical Record #: [id number]                     Page 1 of 2                                    [address]   [id number]    Consent for Anesthesia    I, Lory Son agree to be cared for by an anesthesiologist, who is specially trained to monitor me and give me medicine to put me to sleep or keep me comfortable during my procedure    I understand that my anesthesiologist is not an employee or agent of [facility] or [employer]. He or she works for [employer].    As the patient asking for anesthesia services, I agree to:  Allow the anesthesiologist (anesthesia doctor) to give me medicine and do additional procedures as necessary. Some examples are: Starting or using an “IV” to give me medicine, fluids or blood during my procedure, and having a breathing tube placed to help me breathe when I’m asleep (intubation). In the event that my heart stops working properly, I understand that my anesthesiologist will make every effort to sustain my life, unless otherwise directed by [facility] Do Not Resuscitate documents.  Tell my anesthesia doctor before my procedure:  If I am pregnant.  The last time that I ate or drank.  All of the medicines I take (including prescriptions, herbal supplements, and pills I can buy without a prescription (including street drugs/illegal medications). Failure to inform my anesthesiologist about these medicines may increase my risk of anesthetic complications.  If I am allergic to anything or have had a reaction to anesthesia before.  I understand how the anesthesia medicine will help me (benefits).  I understand that with any type of anesthesia medicine there are risks:  The most common risks are: nausea, vomiting, sore throat, muscle soreness, damage to my eyes, mouth, or teeth (from breathing tube placement).  Rare risks include: remembering what happened during my procedure, allergic reactions to medications, injury to my airway, heart, lungs, vision, nerves, or muscles and in extremely rare instances death.  My doctor has explained to me other choices available  to me for my care (alternatives).  Pregnant Patients (“epidural”):  I understand that the risks of having an epidural (medicine given into my back to help control pain during labor), include itching, low blood pressure, difficulty urinating, headache or slowing of the baby’s heart. Very rare risks include infection, bleeding, seizure, irregular heart rhythms and nerve injury.  Regional Anesthesia (“spinal”, “epidural”, & “nerve blocks”):  I understand that rare but potential complications include headache, bleeding, infection, seizure, irregular heart rhythms, and nerve injury.    I can change my mind about having anesthesia services at any time before I get the medicine.    _____________________________________________________________________________  Patient (or Representative) Signature/Relationship to Patient  Date   Time    _____________________________________________________________________________   Name (if used)    Language/Organization   Time    _____________________________________________________________________________  Anesthesiologist Signature     Date   Time  I have discussed the procedure and information above with the patient (or patient’s representative) and answered their questions. The patient or their representative has agreed to have anesthesia services.    _____________________________________________________________________________  Witness        Date   Time  I have verified that the signature is that of the patient or patient’s representative, and that it was signed before the procedure  Patient Name: Lory Son     : 1995                 Printed: 2024     Medical Record #: [id number]                     Page 2 of 2

## (undated) NOTE — Clinical Note
Hello!  I had the pleasure of seeing your patient, Steven Navarro, today in the office and have routed the progress note to you for your review. Thank you for the referral and please contact me if you have any questions.     Sincerely,    Deandra Davis MD

## (undated) NOTE — Clinical Note
Hello!  I had the pleasure of seeing your patient, Zi Puente, today in the office and have routed the progress note to you for your review. Thank you for the referral and please contact me if you have any questions.     Sincerely,    Nikki Felty MD

## (undated) NOTE — LETTER
1/31/2024          To Whom It May Concern:    Lory Son is currently under my medical care and may return to work at this time.    Lory was seen on the office today and may return to work on 2/7/2024.      If you require additional information please contact our office.        Sincerely,    Arnaud Urrutia DPM

## (undated) NOTE — LETTER
[facility], [facility] [address] [id number]  [phone]            9/7/2019    [address] [place] [address]  Rlyee Rodriguez [id number]    Dear Therese Shell,    Our records indicate that you are due